# Patient Record
Sex: FEMALE | Race: WHITE | Employment: OTHER | URBAN - NONMETROPOLITAN AREA
[De-identification: names, ages, dates, MRNs, and addresses within clinical notes are randomized per-mention and may not be internally consistent; named-entity substitution may affect disease eponyms.]

---

## 2022-01-01 ENCOUNTER — APPOINTMENT (OUTPATIENT)
Dept: ULTRASOUND IMAGING | Age: 87
DRG: 534 | End: 2022-01-01
Attending: INTERNAL MEDICINE
Payer: MEDICARE

## 2022-01-01 ENCOUNTER — HOSPITAL ENCOUNTER (INPATIENT)
Age: 87
LOS: 4 days | Discharge: ANOTHER HOSPITAL | DRG: 534 | End: 2022-12-21
Attending: EMERGENCY MEDICINE | Admitting: INTERNAL MEDICINE
Payer: MEDICARE

## 2022-01-01 ENCOUNTER — APPOINTMENT (OUTPATIENT)
Dept: GENERAL RADIOLOGY | Age: 87
DRG: 534 | End: 2022-01-01
Attending: INTERNAL MEDICINE
Payer: MEDICARE

## 2022-01-01 ENCOUNTER — APPOINTMENT (OUTPATIENT)
Dept: GENERAL RADIOLOGY | Age: 87
DRG: 533 | End: 2022-01-01
Attending: STUDENT IN AN ORGANIZED HEALTH CARE EDUCATION/TRAINING PROGRAM
Payer: MEDICARE

## 2022-01-01 ENCOUNTER — APPOINTMENT (OUTPATIENT)
Dept: CT IMAGING | Age: 87
DRG: 533 | End: 2022-01-01
Attending: NURSE PRACTITIONER
Payer: MEDICARE

## 2022-01-01 ENCOUNTER — APPOINTMENT (OUTPATIENT)
Dept: NON INVASIVE DIAGNOSTICS | Age: 87
DRG: 534 | End: 2022-01-01
Attending: INTERNAL MEDICINE
Payer: MEDICARE

## 2022-01-01 ENCOUNTER — HOSPITAL ENCOUNTER (INPATIENT)
Age: 87
LOS: 3 days | Discharge: HOSPICE | DRG: 533 | End: 2022-12-24
Attending: FAMILY MEDICINE | Admitting: FAMILY MEDICINE
Payer: MEDICARE

## 2022-01-01 ENCOUNTER — HOSPICE ADMISSION (OUTPATIENT)
Dept: HOSPICE | Facility: HOSPICE | Age: 87
End: 2022-01-01
Payer: MEDICARE

## 2022-01-01 ENCOUNTER — APPOINTMENT (OUTPATIENT)
Dept: CT IMAGING | Age: 87
DRG: 533 | End: 2022-01-01
Attending: STUDENT IN AN ORGANIZED HEALTH CARE EDUCATION/TRAINING PROGRAM
Payer: MEDICARE

## 2022-01-01 ENCOUNTER — HOSPITAL ENCOUNTER (INPATIENT)
Age: 87
LOS: 1 days | DRG: 951 | End: 2022-12-25
Attending: INTERNAL MEDICINE | Admitting: INTERNAL MEDICINE

## 2022-01-01 VITALS
DIASTOLIC BLOOD PRESSURE: 43 MMHG | SYSTOLIC BLOOD PRESSURE: 108 MMHG | WEIGHT: 134 LBS | RESPIRATION RATE: 26 BRPM | HEIGHT: 67 IN | HEART RATE: 113 BPM | OXYGEN SATURATION: 99 % | BODY MASS INDEX: 21.03 KG/M2 | TEMPERATURE: 97.7 F

## 2022-01-01 VITALS
OXYGEN SATURATION: 91 % | HEART RATE: 130 BPM | RESPIRATION RATE: 32 BRPM | TEMPERATURE: 99 F | SYSTOLIC BLOOD PRESSURE: 102 MMHG | DIASTOLIC BLOOD PRESSURE: 47 MMHG

## 2022-01-01 VITALS
TEMPERATURE: 99.5 F | SYSTOLIC BLOOD PRESSURE: 104 MMHG | DIASTOLIC BLOOD PRESSURE: 60 MMHG | OXYGEN SATURATION: 96 % | HEART RATE: 98 BPM | RESPIRATION RATE: 29 BRPM

## 2022-01-01 DIAGNOSIS — S72.491A CLOSED COMMINUTED INTRA-ARTICULAR FRACTURE OF DISTAL END OF RIGHT FEMUR, INITIAL ENCOUNTER (HCC): ICD-10-CM

## 2022-01-01 DIAGNOSIS — S72.91XA CLOSED FRACTURE OF RIGHT FEMUR, UNSPECIFIED FRACTURE MORPHOLOGY, UNSPECIFIED PORTION OF FEMUR, INITIAL ENCOUNTER (HCC): Primary | ICD-10-CM

## 2022-01-01 DIAGNOSIS — R06.02 SHORTNESS OF BREATH: Primary | ICD-10-CM

## 2022-01-01 DIAGNOSIS — R41.0 DELIRIUM: ICD-10-CM

## 2022-01-01 DIAGNOSIS — G30.1 LATE ONSET ALZHEIMER DEMENTIA, UNSPECIFIED DEMENTIA SEVERITY, UNSPECIFIED WHETHER BEHAVIORAL, PSYCHOTIC, OR MOOD DISTURBANCE OR ANXIETY (HCC): Primary | ICD-10-CM

## 2022-01-01 DIAGNOSIS — I95.9 HYPOTENSION, UNSPECIFIED HYPOTENSION TYPE: ICD-10-CM

## 2022-01-01 DIAGNOSIS — F02.80 LATE ONSET ALZHEIMER DEMENTIA, UNSPECIFIED DEMENTIA SEVERITY, UNSPECIFIED WHETHER BEHAVIORAL, PSYCHOTIC, OR MOOD DISTURBANCE OR ANXIETY (HCC): Primary | ICD-10-CM
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
## 2022-01-01 DIAGNOSIS — Z51.5 PALLIATIVE CARE ENCOUNTER: ICD-10-CM

## 2022-01-01 DIAGNOSIS — Z51.5 HOSPICE CARE: ICD-10-CM

## 2022-01-01 DIAGNOSIS — G93.41 METABOLIC ENCEPHALOPATHY: ICD-10-CM

## 2022-01-01 DIAGNOSIS — Z51.5 END OF LIFE CARE: ICD-10-CM

## 2022-01-01 LAB
ABO + RH BLD: NORMAL
ABO + RH BLD: NORMAL
ALBUMIN SERPL-MCNC: 2.4 G/DL (ref 3.5–5)
ALBUMIN SERPL-MCNC: 2.6 G/DL (ref 3.5–5)
ALBUMIN SERPL-MCNC: 3.4 G/DL (ref 3.4–5)
ALBUMIN SERPL-MCNC: 3.6 G/DL (ref 3.4–5)
ALBUMIN/GLOB SERPL: 0.8 {RATIO} (ref 1.1–2.2)
ALBUMIN/GLOB SERPL: 0.8 {RATIO} (ref 1.1–2.2)
ALBUMIN/GLOB SERPL: 1 {RATIO} (ref 0.8–1.7)
ALBUMIN/GLOB SERPL: 1 {RATIO} (ref 0.8–1.7)
ALP SERPL-CCNC: 76 U/L (ref 45–117)
ALP SERPL-CCNC: 78 U/L (ref 45–117)
ALP SERPL-CCNC: 82 U/L (ref 45–117)
ALP SERPL-CCNC: 95 U/L (ref 45–117)
ALT SERPL-CCNC: 106 U/L (ref 13–56)
ALT SERPL-CCNC: 18 U/L (ref 13–56)
ALT SERPL-CCNC: 21 U/L (ref 12–78)
ALT SERPL-CCNC: 28 U/L (ref 12–78)
ANION GAP SERPL CALC-SCNC: 10 MMOL/L (ref 3–18)
ANION GAP SERPL CALC-SCNC: 2 MMOL/L (ref 5–15)
ANION GAP SERPL CALC-SCNC: 4 MMOL/L (ref 3–18)
ANION GAP SERPL CALC-SCNC: 6 MMOL/L (ref 3–18)
ANION GAP SERPL CALC-SCNC: 7 MMOL/L (ref 3–18)
ANION GAP SERPL CALC-SCNC: 7 MMOL/L (ref 3–18)
ANION GAP SERPL CALC-SCNC: 8 MMOL/L (ref 3–18)
ANION GAP SERPL CALC-SCNC: 9 MMOL/L (ref 3–18)
ANION GAP SERPL CALC-SCNC: ABNORMAL MMOL/L (ref 5–15)
ANTI-COMPLEMENT (C3B,C3D): NORMAL
APPEARANCE UR: CLEAR
APPEARANCE UR: CLEAR
APTT PPP: 26.7 SEC (ref 23–36.4)
AST SERPL W P-5'-P-CCNC: 134 U/L (ref 10–38)
AST SERPL W P-5'-P-CCNC: 15 U/L (ref 10–38)
AST SERPL-CCNC: 15 U/L (ref 15–37)
AST SERPL-CCNC: 68 U/L (ref 15–37)
ATRIAL RATE: 104 BPM
ATRIAL RATE: 125 BPM
BACTERIA SPEC CULT: ABNORMAL
BACTERIA SPEC CULT: NORMAL
BACTERIA SPEC CULT: NORMAL
BACTERIA URNS QL MICRO: ABNORMAL /HPF
BACTERIA URNS QL MICRO: NEGATIVE /HPF
BASOPHILS # BLD: 0 K/UL (ref 0–0.1)
BASOPHILS NFR BLD: 0 % (ref 0–1)
BASOPHILS NFR BLD: 0 % (ref 0–1)
BASOPHILS NFR BLD: 0 % (ref 0–2)
BASOPHILS NFR BLD: 1 % (ref 0–2)
BILIRUB DIRECT SERPL-MCNC: 1.6 MG/DL (ref 0–0.2)
BILIRUB SERPL-MCNC: 1.4 MG/DL (ref 0.2–1)
BILIRUB SERPL-MCNC: 2.6 MG/DL (ref 0.2–1)
BILIRUB SERPL-MCNC: 3.8 MG/DL (ref 0.2–1)
BILIRUB SERPL-MCNC: 7.8 MG/DL (ref 0.2–1)
BILIRUB UR QL CFM: POSITIVE
BILIRUB UR QL: ABNORMAL
BLD PROD TYP BPU: NORMAL
BLD PROD TYP BPU: NORMAL
BLOOD BANK CMNT PATIENT-IMP: NORMAL
BLOOD GROUP ANTIBODIES SERPL: NEGATIVE
BLOOD GROUP ANTIBODIES SERPL: NORMAL
BLOOD GROUP ANTIBODIES SERPL: NORMAL
BNP SERPL-MCNC: 4675 PG/ML (ref 0–1800)
BPU ID: NORMAL
BPU ID: NORMAL
BUN SERPL-MCNC: 13 MG/DL (ref 7–18)
BUN SERPL-MCNC: 14 MG/DL (ref 7–18)
BUN SERPL-MCNC: 18 MG/DL (ref 6–20)
BUN SERPL-MCNC: 22 MG/DL (ref 6–20)
BUN SERPL-MCNC: 27 MG/DL (ref 7–18)
BUN SERPL-MCNC: 33 MG/DL (ref 7–18)
BUN SERPL-MCNC: 33 MG/DL (ref 7–18)
BUN SERPL-MCNC: 36 MG/DL (ref 7–18)
BUN SERPL-MCNC: 37 MG/DL (ref 7–18)
BUN/CREAT SERPL: 16 (ref 12–20)
BUN/CREAT SERPL: 17 (ref 12–20)
BUN/CREAT SERPL: 26 (ref 12–20)
BUN/CREAT SERPL: 32 (ref 12–20)
BUN/CREAT SERPL: 37 (ref 12–20)
BUN/CREAT SERPL: 40 (ref 12–20)
BUN/CREAT SERPL: 40 (ref 12–20)
BUN/CREAT SERPL: 42 (ref 12–20)
BUN/CREAT SERPL: 48 (ref 12–20)
CA-I BLD-MCNC: 8.4 MG/DL (ref 8.5–10.1)
CA-I BLD-MCNC: 8.5 MG/DL (ref 8.5–10.1)
CA-I BLD-MCNC: 8.6 MG/DL (ref 8.5–10.1)
CA-I BLD-MCNC: 9 MG/DL (ref 8.5–10.1)
CA-I BLD-MCNC: 9.1 MG/DL (ref 8.5–10.1)
CA-I BLD-MCNC: 9.1 MG/DL (ref 8.5–10.1)
CA-I BLD-MCNC: 9.3 MG/DL (ref 8.5–10.1)
CALCIUM SERPL-MCNC: 8.2 MG/DL (ref 8.5–10.1)
CALCIUM SERPL-MCNC: 9 MG/DL (ref 8.5–10.1)
CALCULATED R AXIS, ECG10: 66 DEGREES
CALCULATED R AXIS, ECG10: 80 DEGREES
CALCULATED T AXIS, ECG11: 20 DEGREES
CALCULATED T AXIS, ECG11: 68 DEGREES
CAOX CRY URNS QL MICRO: ABNORMAL
CC UR VC: ABNORMAL
CEA SERPL-MCNC: 1.3 NG/ML
CHLORIDE SERPL-SCNC: 101 MMOL/L (ref 100–111)
CHLORIDE SERPL-SCNC: 105 MMOL/L (ref 100–111)
CHLORIDE SERPL-SCNC: 107 MMOL/L (ref 100–111)
CHLORIDE SERPL-SCNC: 107 MMOL/L (ref 100–111)
CHLORIDE SERPL-SCNC: 111 MMOL/L (ref 100–111)
CHLORIDE SERPL-SCNC: 112 MMOL/L (ref 97–108)
CHLORIDE SERPL-SCNC: 114 MMOL/L (ref 100–111)
CHLORIDE SERPL-SCNC: 114 MMOL/L (ref 100–111)
CHLORIDE SERPL-SCNC: 116 MMOL/L (ref 97–108)
CO2 SERPL-SCNC: 24 MMOL/L (ref 21–32)
CO2 SERPL-SCNC: 26 MMOL/L (ref 21–32)
CO2 SERPL-SCNC: 27 MMOL/L (ref 21–32)
CO2 SERPL-SCNC: 28 MMOL/L (ref 21–32)
CO2 SERPL-SCNC: 29 MMOL/L (ref 21–32)
CO2 SERPL-SCNC: 29 MMOL/L (ref 21–32)
CO2 SERPL-SCNC: 30 MMOL/L (ref 21–32)
COLONY COUNT,CNT: NORMAL
COLONY COUNT,CNT: NORMAL
COLOR UR: ABNORMAL
COLOR UR: ABNORMAL
COVID-19 RAPID TEST, COVR: NOT DETECTED
CREAT SERPL-MCNC: 0.6 MG/DL (ref 0.55–1.02)
CREAT SERPL-MCNC: 0.7 MG/DL (ref 0.55–1.02)
CREAT SERPL-MCNC: 0.75 MG/DL (ref 0.6–1.3)
CREAT SERPL-MCNC: 0.81 MG/DL (ref 0.6–1.3)
CREAT SERPL-MCNC: 0.82 MG/DL (ref 0.6–1.3)
CREAT SERPL-MCNC: 0.84 MG/DL (ref 0.6–1.3)
CREAT SERPL-MCNC: 0.89 MG/DL (ref 0.6–1.3)
CROSSMATCH RESULT,%XM: NORMAL
CROSSMATCH RESULT,%XM: NORMAL
DAT IGG-SP REAG RBC QL: NORMAL
DAT POLY-SP REAG RBC QL: NORMAL
DIAGNOSIS, 93000: NORMAL
DIAGNOSIS, 93000: NORMAL
DIFFERENTIAL METHOD BLD: ABNORMAL
ECHO AO ASC DIAM: 3.5 CM
ECHO AO ASCENDING AORTA INDEX: 2.05 CM/M2
ECHO AO ROOT DIAM: 3.2 CM
ECHO AO ROOT INDEX: 1.87 CM/M2
ECHO AV AREA PEAK VELOCITY: 1.8 CM2
ECHO AV AREA VTI: 1.9 CM2
ECHO AV AREA/BSA PEAK VELOCITY: 1.1 CM2/M2
ECHO AV AREA/BSA VTI: 1.1 CM2/M2
ECHO AV MEAN GRADIENT: 8 MMHG
ECHO AV MEAN VELOCITY: 1.3 M/S
ECHO AV PEAK GRADIENT: 15 MMHG
ECHO AV PEAK VELOCITY: 1.9 M/S
ECHO AV VELOCITY RATIO: 0.53
ECHO AV VTI: 35.6 CM
ECHO EST RA PRESSURE: 3 MMHG
ECHO IVC PROX: 1.9 CM
ECHO LA AREA 2C: 32 CM2
ECHO LA AREA 4C: 26.7 CM2
ECHO LA DIAMETER INDEX: 2.92 CM/M2
ECHO LA DIAMETER: 5 CM
ECHO LA MAJOR AXIS: 6.5 CM
ECHO LA MINOR AXIS: 7.9 CM
ECHO LA TO AORTIC ROOT RATIO: 1.56
ECHO LA VOL BP: 109 ML (ref 22–52)
ECHO LA VOL/BSA BIPLANE: 64 ML/M2 (ref 16–34)
ECHO LV FRACTIONAL SHORTENING: 34 % (ref 28–44)
ECHO LV INTERNAL DIMENSION DIASTOLE INDEX: 2.4 CM/M2
ECHO LV INTERNAL DIMENSION DIASTOLIC: 4.1 CM (ref 3.9–5.3)
ECHO LV INTERNAL DIMENSION SYSTOLIC INDEX: 1.58 CM/M2
ECHO LV INTERNAL DIMENSION SYSTOLIC: 2.7 CM
ECHO LV IVSD: 0.9 CM (ref 0.6–0.9)
ECHO LV MASS 2D: 132.1 G (ref 67–162)
ECHO LV MASS INDEX 2D: 77.3 G/M2 (ref 43–95)
ECHO LV POSTERIOR WALL DIASTOLIC: 1.1 CM (ref 0.6–0.9)
ECHO LV RELATIVE WALL THICKNESS RATIO: 0.54
ECHO LVOT AREA: 3.5 CM2
ECHO LVOT AV VTI INDEX: 0.55
ECHO LVOT DIAM: 2.1 CM
ECHO LVOT MEAN GRADIENT: 2 MMHG
ECHO LVOT PEAK GRADIENT: 4 MMHG
ECHO LVOT PEAK VELOCITY: 1 M/S
ECHO LVOT STROKE VOLUME INDEX: 39.5 ML/M2
ECHO LVOT SV: 67.5 ML
ECHO LVOT VTI: 19.5 CM
ECHO MV AREA VTI: 3.1 CM2
ECHO MV LVOT VTI INDEX: 1.13
ECHO MV MAX VELOCITY: 1.3 M/S
ECHO MV MEAN GRADIENT: 3 MMHG
ECHO MV MEAN VELOCITY: 0.8 M/S
ECHO MV PEAK GRADIENT: 7 MMHG
ECHO MV VTI: 22.1 CM
ECHO PULMONARY ARTERY END DIASTOLIC PRESSURE: 4 MMHG
ECHO PV MAX VELOCITY: 1 M/S
ECHO PV MAX VELOCITY: 1 M/S
ECHO PV PEAK GRADIENT: 4 MMHG
ECHO RA AREA 4C: 23.9 CM2
ECHO RA END SYSTOLIC VOLUME APICAL 4 CHAMBER INDEX BSA: 41 ML/M2
ECHO RA VOLUME: 70 ML
ECHO RIGHT VENTRICULAR SYSTOLIC PRESSURE (RVSP): 48 MMHG
ECHO RV BASAL DIMENSION: 3.9 CM
ECHO RV LONGITUDINAL DIMENSION: 4.8 CM
ECHO RV MID DIMENSION: 2.9 CM
ECHO RV TAPSE: 1.6 CM (ref 1.7–?)
ECHO TV REGURGITANT MAX VELOCITY: 3.35 M/S
ECHO TV REGURGITANT PEAK GRADIENT: 45 MMHG
EOSINOPHIL # BLD: 0 K/UL (ref 0–0.4)
EOSINOPHIL # BLD: 0.1 K/UL (ref 0–0.4)
EOSINOPHIL # BLD: 0.1 K/UL (ref 0–0.4)
EOSINOPHIL NFR BLD: 0 % (ref 0–5)
EOSINOPHIL NFR BLD: 0 % (ref 0–7)
EOSINOPHIL NFR BLD: 1 % (ref 0–5)
EOSINOPHIL NFR BLD: 1 % (ref 0–7)
EPITH CASTS URNS QL MICRO: ABNORMAL /LPF
EPITH CASTS URNS QL MICRO: ABNORMAL /LPF (ref 0–20)
ERYTHROCYTE [DISTWIDTH] IN BLOOD BY AUTOMATED COUNT: 16.6 % (ref 11.6–14.5)
ERYTHROCYTE [DISTWIDTH] IN BLOOD BY AUTOMATED COUNT: 16.9 % (ref 11.6–14.5)
ERYTHROCYTE [DISTWIDTH] IN BLOOD BY AUTOMATED COUNT: 17 % (ref 11.5–14.5)
ERYTHROCYTE [DISTWIDTH] IN BLOOD BY AUTOMATED COUNT: 17.5 % (ref 11.6–14.5)
ERYTHROCYTE [DISTWIDTH] IN BLOOD BY AUTOMATED COUNT: 18.1 % (ref 11.6–14.5)
ERYTHROCYTE [DISTWIDTH] IN BLOOD BY AUTOMATED COUNT: 19.1 % (ref 11.6–14.5)
ERYTHROCYTE [DISTWIDTH] IN BLOOD BY AUTOMATED COUNT: 20 % (ref 11.6–14.5)
ERYTHROCYTE [DISTWIDTH] IN BLOOD BY AUTOMATED COUNT: 20.1 % (ref 11.6–14.5)
ERYTHROCYTE [DISTWIDTH] IN BLOOD BY AUTOMATED COUNT: 24.1 % (ref 11.6–14.5)
ERYTHROCYTE [DISTWIDTH] IN BLOOD BY AUTOMATED COUNT: 24.7 % (ref 11.5–14.5)
FERRITIN SERPL-MCNC: 631 NG/ML (ref 26–388)
FLOW CYTOMETRY: NORMAL
FOLATE SERPL-MCNC: 28.7 NG/ML (ref 5–21)
GLOBULIN SER CALC-MCNC: 3.1 G/DL (ref 2–4)
GLOBULIN SER CALC-MCNC: 3.1 G/DL (ref 2–4)
GLOBULIN SER CALC-MCNC: 3.5 G/DL (ref 2–4)
GLOBULIN SER CALC-MCNC: 3.6 G/DL (ref 2–4)
GLUCOSE BLD STRIP.AUTO-MCNC: 103 MG/DL (ref 70–110)
GLUCOSE SERPL-MCNC: 104 MG/DL (ref 74–99)
GLUCOSE SERPL-MCNC: 108 MG/DL (ref 74–99)
GLUCOSE SERPL-MCNC: 109 MG/DL (ref 65–100)
GLUCOSE SERPL-MCNC: 110 MG/DL (ref 74–99)
GLUCOSE SERPL-MCNC: 130 MG/DL (ref 65–100)
GLUCOSE SERPL-MCNC: 139 MG/DL (ref 74–99)
GLUCOSE SERPL-MCNC: 94 MG/DL (ref 74–99)
GLUCOSE SERPL-MCNC: 94 MG/DL (ref 74–99)
GLUCOSE SERPL-MCNC: 99 MG/DL (ref 74–99)
GLUCOSE UR STRIP.AUTO-MCNC: NEGATIVE MG/DL
GLUCOSE UR STRIP.AUTO-MCNC: NEGATIVE MG/DL
HAPTOGLOB SERPL-MCNC: 8 MG/DL (ref 30–200)
HCT VFR BLD AUTO: 16 % (ref 35–47)
HCT VFR BLD AUTO: 17.3 % (ref 35–45)
HCT VFR BLD AUTO: 18.1 % (ref 35–45)
HCT VFR BLD AUTO: 19.4 % (ref 35–45)
HCT VFR BLD AUTO: 19.4 % (ref 35–45)
HCT VFR BLD AUTO: 20.6 % (ref 35–45)
HCT VFR BLD AUTO: 21.1 % (ref 35–45)
HCT VFR BLD AUTO: 21.2 % (ref 35–45)
HCT VFR BLD AUTO: 22.4 % (ref 35–45)
HCT VFR BLD AUTO: 24.4 % (ref 35–47)
HCT VFR BLD AUTO: 27.6 % (ref 35–45)
HCT VFR BLD AUTO: 30.4 % (ref 35–45)
HEMOCCULT STL QL: NEGATIVE
HGB BLD-MCNC: 10.8 G/DL (ref 12–16)
HGB BLD-MCNC: 5.3 G/DL (ref 11.5–16)
HGB BLD-MCNC: 6.1 G/DL (ref 12–16)
HGB BLD-MCNC: 6.2 G/DL (ref 12–16)
HGB BLD-MCNC: 6.3 G/DL (ref 12–16)
HGB BLD-MCNC: 6.8 G/DL (ref 12–16)
HGB BLD-MCNC: 7.1 G/DL (ref 12–16)
HGB BLD-MCNC: 7.1 G/DL (ref 12–16)
HGB BLD-MCNC: 7.2 G/DL (ref 12–16)
HGB BLD-MCNC: 7.8 G/DL (ref 12–16)
HGB BLD-MCNC: 8 G/DL (ref 11.5–16)
HGB BLD-MCNC: 9.3 G/DL (ref 12–16)
HGB UR QL STRIP: ABNORMAL
HGB UR QL STRIP: NEGATIVE
HISTORY CHECKED?,CKHIST: NORMAL
HYALINE CASTS URNS QL MICRO: ABNORMAL /LPF (ref 0–5)
IMM GRANULOCYTES # BLD AUTO: 0 K/UL (ref 0–0.04)
IMM GRANULOCYTES # BLD AUTO: 0.1 K/UL (ref 0–0.04)
IMM GRANULOCYTES # BLD AUTO: 0.3 K/UL (ref 0–0.04)
IMM GRANULOCYTES NFR BLD AUTO: 0 % (ref 0–0.5)
IMM GRANULOCYTES NFR BLD AUTO: 0 % (ref 0–0.5)
IMM GRANULOCYTES NFR BLD AUTO: 1 % (ref 0–0.5)
IMM GRANULOCYTES NFR BLD AUTO: 3 % (ref 0–0.5)
INR PPP: 1.8 (ref 0.8–1.2)
INR PPP: 2.3 (ref 0.8–1.2)
INR PPP: 3 (ref 0.8–1.2)
INR PPP: 3.9 (ref 0.8–1.2)
INR PPP: 4.1 (ref 0.8–1.2)
IRON SATN MFR SERPL: 30 % (ref 20–50)
IRON SERPL-MCNC: 31 UG/DL (ref 35–150)
KETONES UR QL STRIP.AUTO: ABNORMAL MG/DL
KETONES UR QL STRIP.AUTO: NEGATIVE MG/DL
LDH SERPL L TO P-CCNC: 106 U/L (ref 81–246)
LDH SERPL L TO P-CCNC: 486 U/L (ref 81–246)
LEUKOCYTE ESTERASE UR QL STRIP.AUTO: ABNORMAL
LEUKOCYTE ESTERASE UR QL STRIP.AUTO: NEGATIVE
LYMPHOCYTES # BLD: 0.5 K/UL (ref 0.8–3.5)
LYMPHOCYTES # BLD: 0.6 K/UL (ref 0.9–3.6)
LYMPHOCYTES # BLD: 0.7 K/UL (ref 0.8–3.5)
LYMPHOCYTES # BLD: 0.7 K/UL (ref 0.9–3.6)
LYMPHOCYTES # BLD: 0.8 K/UL (ref 0.9–3.6)
LYMPHOCYTES # BLD: 1.2 K/UL (ref 0.9–3.6)
LYMPHOCYTES # BLD: 1.3 K/UL (ref 0.9–3.6)
LYMPHOCYTES # BLD: 1.5 K/UL (ref 0.9–3.6)
LYMPHOCYTES NFR BLD: 10 % (ref 21–52)
LYMPHOCYTES NFR BLD: 14 % (ref 21–52)
LYMPHOCYTES NFR BLD: 17 % (ref 21–52)
LYMPHOCYTES NFR BLD: 27 % (ref 21–52)
LYMPHOCYTES NFR BLD: 6 % (ref 21–52)
LYMPHOCYTES NFR BLD: 6 % (ref 21–52)
LYMPHOCYTES NFR BLD: 7 % (ref 12–49)
LYMPHOCYTES NFR BLD: 7 % (ref 21–52)
LYMPHOCYTES NFR BLD: 7 % (ref 21–52)
LYMPHOCYTES NFR BLD: 8 % (ref 12–49)
MAGNESIUM SERPL-MCNC: 2.1 MG/DL (ref 1.6–2.6)
MAGNESIUM SERPL-MCNC: 2.2 MG/DL (ref 1.6–2.6)
MAGNESIUM SERPL-MCNC: 2.3 MG/DL (ref 1.6–2.6)
MCH RBC QN AUTO: 31.1 PG (ref 26–34)
MCH RBC QN AUTO: 32.9 PG (ref 26–34)
MCH RBC QN AUTO: 33.3 PG (ref 24–34)
MCH RBC QN AUTO: 33.7 PG (ref 24–34)
MCH RBC QN AUTO: 34.1 PG (ref 24–34)
MCH RBC QN AUTO: 35.2 PG (ref 24–34)
MCH RBC QN AUTO: 35.3 PG (ref 24–34)
MCH RBC QN AUTO: 36 PG (ref 24–34)
MCH RBC QN AUTO: 36.4 PG (ref 24–34)
MCH RBC QN AUTO: 37 PG (ref 24–34)
MCH RBC QN AUTO: 37.4 PG (ref 24–34)
MCH RBC QN AUTO: 43 PG (ref 24–34)
MCHC RBC AUTO-ENTMCNC: 32 G/DL (ref 31–37)
MCHC RBC AUTO-ENTMCNC: 32.5 G/DL (ref 31–37)
MCHC RBC AUTO-ENTMCNC: 32.8 G/DL (ref 30–36.5)
MCHC RBC AUTO-ENTMCNC: 33 G/DL (ref 31–37)
MCHC RBC AUTO-ENTMCNC: 33.1 G/DL (ref 30–36.5)
MCHC RBC AUTO-ENTMCNC: 33.6 G/DL (ref 31–37)
MCHC RBC AUTO-ENTMCNC: 33.7 G/DL (ref 31–37)
MCHC RBC AUTO-ENTMCNC: 34.4 G/DL (ref 31–37)
MCHC RBC AUTO-ENTMCNC: 34.8 G/DL (ref 31–37)
MCHC RBC AUTO-ENTMCNC: 35.3 G/DL (ref 31–37)
MCHC RBC AUTO-ENTMCNC: 35.5 G/DL (ref 31–37)
MCHC RBC AUTO-ENTMCNC: 39.2 G/DL (ref 31–37)
MCV RBC AUTO: 100 FL (ref 78–100)
MCV RBC AUTO: 102.4 FL (ref 78–100)
MCV RBC AUTO: 102.6 FL (ref 78–100)
MCV RBC AUTO: 106.1 FL (ref 78–100)
MCV RBC AUTO: 106.2 FL (ref 78–100)
MCV RBC AUTO: 106.6 FL (ref 78–100)
MCV RBC AUTO: 106.7 FL (ref 78–100)
MCV RBC AUTO: 109.7 FL (ref 78–100)
MCV RBC AUTO: 94.9 FL (ref 80–99)
MCV RBC AUTO: 99.4 FL (ref 80–99)
MONOCYTES # BLD: 0.3 K/UL (ref 0.05–1.2)
MONOCYTES # BLD: 0.4 K/UL (ref 0–1)
MONOCYTES # BLD: 0.7 K/UL (ref 0.05–1.2)
MONOCYTES # BLD: 0.7 K/UL (ref 0–1)
MONOCYTES # BLD: 0.8 K/UL (ref 0.05–1.2)
MONOCYTES # BLD: 0.8 K/UL (ref 0.05–1.2)
MONOCYTES NFR BLD: 6 % (ref 3–10)
MONOCYTES NFR BLD: 6 % (ref 5–13)
MONOCYTES NFR BLD: 8 % (ref 3–10)
MONOCYTES NFR BLD: 8 % (ref 5–13)
MONOCYTES NFR BLD: 9 % (ref 3–10)
MONOCYTES NFR BLD: 9 % (ref 3–10)
NEUTS SEG # BLD: 3 K/UL (ref 1.8–8)
NEUTS SEG # BLD: 5.7 K/UL (ref 1.8–8)
NEUTS SEG # BLD: 6.6 K/UL (ref 1.8–8)
NEUTS SEG # BLD: 6.6 K/UL (ref 1.8–8)
NEUTS SEG # BLD: 6.8 K/UL (ref 1.8–8)
NEUTS SEG # BLD: 7.1 K/UL (ref 1.8–8)
NEUTS SEG # BLD: 7.3 K/UL (ref 1.8–8)
NEUTS SEG # BLD: 7.5 K/UL (ref 1.8–8)
NEUTS SEG # BLD: 8 K/UL (ref 1.8–8)
NEUTS SEG # BLD: 8.4 K/UL (ref 1.8–8)
NEUTS SEG NFR BLD: 64 % (ref 40–73)
NEUTS SEG NFR BLD: 75 % (ref 40–73)
NEUTS SEG NFR BLD: 77 % (ref 40–73)
NEUTS SEG NFR BLD: 81 % (ref 32–75)
NEUTS SEG NFR BLD: 82 % (ref 40–73)
NEUTS SEG NFR BLD: 83 % (ref 40–73)
NEUTS SEG NFR BLD: 84 % (ref 40–73)
NEUTS SEG NFR BLD: 84 % (ref 40–73)
NEUTS SEG NFR BLD: 85 % (ref 32–75)
NEUTS SEG NFR BLD: 85 % (ref 40–73)
NITRITE UR QL STRIP.AUTO: NEGATIVE
NITRITE UR QL STRIP.AUTO: POSITIVE
NRBC # BLD: 0 K/UL (ref 0–0.01)
NRBC # BLD: 0.08 K/UL (ref 0–0.01)
NRBC # BLD: 0.1 K/UL (ref 0–0.01)
NRBC # BLD: 0.15 K/UL (ref 0–0.01)
NRBC # BLD: 0.3 K/UL (ref 0–0.01)
NRBC # BLD: 0.77 K/UL (ref 0–0.01)
NRBC BLD-RTO: 0 PER 100 WBC
NRBC BLD-RTO: 0.9 PER 100 WBC
NRBC BLD-RTO: 1.1 PER 100 WBC
NRBC BLD-RTO: 1.5 PER 100 WBC
NRBC BLD-RTO: 4.4 PER 100 WBC
NRBC BLD-RTO: 8.8 PER 100 WBC
P-R INTERVAL, ECG05: 57 MS
PERFORMED BY, TECHID: NORMAL
PH UR STRIP: 5.5 [PH] (ref 5–8)
PH UR STRIP: 5.5 [PH] (ref 5–8)
PHOSPHATE SERPL-MCNC: 2.2 MG/DL (ref 2.5–4.9)
PHOSPHATE SERPL-MCNC: 3.2 MG/DL (ref 2.5–4.9)
PLATELET # BLD AUTO: 144 K/UL (ref 150–400)
PLATELET # BLD AUTO: 159 K/UL (ref 135–420)
PLATELET # BLD AUTO: 161 K/UL (ref 135–420)
PLATELET # BLD AUTO: 174 K/UL (ref 135–420)
PLATELET # BLD AUTO: 189 K/UL (ref 135–420)
PLATELET # BLD AUTO: 193 K/UL (ref 135–420)
PLATELET # BLD AUTO: 194 K/UL (ref 135–420)
PLATELET # BLD AUTO: 195 K/UL (ref 135–420)
PLATELET # BLD AUTO: 201 K/UL (ref 150–400)
PLATELET # BLD AUTO: 224 K/UL (ref 135–420)
PLATELET COMMENTS,PCOM: ABNORMAL
PMV BLD AUTO: 10.6 FL (ref 9.2–11.8)
PMV BLD AUTO: 10.6 FL (ref 9.2–11.8)
PMV BLD AUTO: 11 FL (ref 9.2–11.8)
PMV BLD AUTO: 11 FL (ref 9.2–11.8)
PMV BLD AUTO: 11.1 FL (ref 9.2–11.8)
PMV BLD AUTO: 11.1 FL (ref 9.2–11.8)
PMV BLD AUTO: 11.3 FL (ref 9.2–11.8)
PMV BLD AUTO: 11.3 FL (ref 9.2–11.8)
PMV BLD AUTO: 11.4 FL (ref 8.9–12.9)
POTASSIUM SERPL-SCNC: 3.5 MMOL/L (ref 3.5–5.1)
POTASSIUM SERPL-SCNC: 4 MMOL/L (ref 3.5–5.5)
POTASSIUM SERPL-SCNC: 4.1 MMOL/L (ref 3.5–5.5)
POTASSIUM SERPL-SCNC: 4.5 MMOL/L (ref 3.5–5.5)
POTASSIUM SERPL-SCNC: 4.6 MMOL/L (ref 3.5–5.5)
POTASSIUM SERPL-SCNC: 4.8 MMOL/L (ref 3.5–5.5)
POTASSIUM SERPL-SCNC: ABNORMAL MMOL/L (ref 3.5–5.1)
PROT SERPL-MCNC: 5.5 G/DL (ref 6.4–8.2)
PROT SERPL-MCNC: 5.7 G/DL (ref 6.4–8.2)
PROT SERPL-MCNC: 6.9 G/DL (ref 6.4–8.2)
PROT SERPL-MCNC: 7.2 G/DL (ref 6.4–8.2)
PROT UR STRIP-MCNC: 30 MG/DL
PROT UR STRIP-MCNC: 30 MG/DL
PROTHROMBIN TIME: 21.3 SEC (ref 11.5–15.2)
PROTHROMBIN TIME: 25.7 SEC (ref 11.5–15.2)
PROTHROMBIN TIME: 31 SEC (ref 11.5–15.2)
PROTHROMBIN TIME: 38.6 SEC (ref 11.5–15.2)
PROTHROMBIN TIME: 39.7 SEC (ref 11.5–15.2)
Q-T INTERVAL, ECG07: 292 MS
Q-T INTERVAL, ECG07: 408 MS
QRS DURATION, ECG06: 76 MS
QRS DURATION, ECG06: 82 MS
QTC CALCULATION (BEZET), ECG08: 383 MS
QTC CALCULATION (BEZET), ECG08: 434 MS
RBC # BLD AUTO: 1.61 M/UL (ref 3.8–5.2)
RBC # BLD AUTO: 1.63 M/UL (ref 4.2–5.3)
RBC # BLD AUTO: 1.65 M/UL (ref 4.2–5.3)
RBC # BLD AUTO: 1.82 M/UL (ref 4.2–5.3)
RBC # BLD AUTO: 1.89 M/UL (ref 4.2–5.3)
RBC # BLD AUTO: 1.93 M/UL (ref 4.2–5.3)
RBC # BLD AUTO: 2.11 M/UL (ref 4.2–5.3)
RBC # BLD AUTO: 2.57 M/UL (ref 3.8–5.2)
RBC # BLD AUTO: 2.76 M/UL (ref 4.2–5.3)
RBC # BLD AUTO: 2.97 M/UL (ref 4.2–5.3)
RBC #/AREA URNS HPF: ABNORMAL /HPF (ref 0–2)
RBC #/AREA URNS HPF: ABNORMAL /HPF (ref 0–5)
RBC MORPH BLD: ABNORMAL
RETICS # AUTO: 0.12 M/UL (ref 0.02–0.08)
RETICS/RBC NFR AUTO: 6.6 % (ref 0.7–2.1)
SERVICE CMNT-IMP: ABNORMAL
SERVICE CMNT-IMP: NORMAL
SODIUM SERPL-SCNC: 138 MMOL/L (ref 136–145)
SODIUM SERPL-SCNC: 139 MMOL/L (ref 136–145)
SODIUM SERPL-SCNC: 140 MMOL/L (ref 136–145)
SODIUM SERPL-SCNC: 142 MMOL/L (ref 136–145)
SODIUM SERPL-SCNC: 142 MMOL/L (ref 136–145)
SODIUM SERPL-SCNC: 144 MMOL/L (ref 136–145)
SODIUM SERPL-SCNC: 146 MMOL/L (ref 136–145)
SODIUM SERPL-SCNC: 148 MMOL/L (ref 136–145)
SODIUM SERPL-SCNC: 149 MMOL/L (ref 136–145)
SP GR UR REFRACTOMETRY: 1.02 (ref 1–1.03)
SP GR UR REFRACTOMETRY: 1.02 (ref 1–1.03)
SPECIAL REQUESTS,SREQ: NORMAL
SPECIMEN EXP DATE BLD: NORMAL
SPECIMEN EXP DATE BLD: NORMAL
STATUS OF UNIT,%ST: NORMAL
STATUS OF UNIT,%ST: NORMAL
THERAPEUTIC RANGE,PTTT: NORMAL SEC (ref 82–109)
TIBC SERPL-MCNC: 104 UG/DL (ref 250–450)
UA: UC IF INDICATED,UAUC: ABNORMAL
UFH PPP CHRO-ACNC: <0.1 IU/ML
UNIT DIVISION, %UDIV: 0
UNIT DIVISION, %UDIV: 0
UROBILINOGEN UR QL STRIP.AUTO: 1 EU/DL (ref 0.2–1)
UROBILINOGEN UR QL STRIP.AUTO: 2 EU/DL (ref 0.2–1)
VENTRICULAR RATE, ECG03: 104 BPM
VENTRICULAR RATE, ECG03: 68 BPM
VIT B12 SERPL-MCNC: 895 PG/ML (ref 193–986)
WBC # BLD AUTO: 4.8 K/UL (ref 4.6–13.2)
WBC # BLD AUTO: 6.8 K/UL (ref 3.6–11)
WBC # BLD AUTO: 8.3 K/UL (ref 4.6–13.2)
WBC # BLD AUTO: 8.7 K/UL (ref 4.6–13.2)
WBC # BLD AUTO: 8.7 K/UL (ref 4.6–13.2)
WBC # BLD AUTO: 8.8 K/UL (ref 3.6–11)
WBC # BLD AUTO: 8.9 K/UL (ref 4.6–13.2)
WBC # BLD AUTO: 8.9 K/UL (ref 4.6–13.2)
WBC # BLD AUTO: 9.6 K/UL (ref 4.6–13.2)
WBC # BLD AUTO: 9.8 K/UL (ref 4.6–13.2)
WBC URNS QL MICRO: ABNORMAL /HPF (ref 0–4)
WBC URNS QL MICRO: ABNORMAL /HPF (ref 0–4)

## 2022-01-01 PROCEDURE — 85025 COMPLETE CBC W/AUTO DIFF WBC: CPT

## 2022-01-01 PROCEDURE — 74011000636 HC RX REV CODE- 636: Performed by: RADIOLOGY

## 2022-01-01 PROCEDURE — 74011250637 HC RX REV CODE- 250/637: Performed by: NURSE PRACTITIONER

## 2022-01-01 PROCEDURE — 80048 BASIC METABOLIC PNL TOTAL CA: CPT

## 2022-01-01 PROCEDURE — 36415 COLL VENOUS BLD VENIPUNCTURE: CPT

## 2022-01-01 PROCEDURE — P9016 RBC LEUKOCYTES REDUCED: HCPCS

## 2022-01-01 PROCEDURE — 85018 HEMOGLOBIN: CPT

## 2022-01-01 PROCEDURE — 94761 N-INVAS EAR/PLS OXIMETRY MLT: CPT

## 2022-01-01 PROCEDURE — 74011000250 HC RX REV CODE- 250: Performed by: NURSE PRACTITIONER

## 2022-01-01 PROCEDURE — 74011000250 HC RX REV CODE- 250: Performed by: STUDENT IN AN ORGANIZED HEALTH CARE EDUCATION/TRAINING PROGRAM

## 2022-01-01 PROCEDURE — 96375 TX/PRO/DX INJ NEW DRUG ADDON: CPT

## 2022-01-01 PROCEDURE — 96376 TX/PRO/DX INJ SAME DRUG ADON: CPT

## 2022-01-01 PROCEDURE — 85610 PROTHROMBIN TIME: CPT

## 2022-01-01 PROCEDURE — 85520 HEPARIN ASSAY: CPT

## 2022-01-01 PROCEDURE — 0656 HSPC GENERAL INPATIENT

## 2022-01-01 PROCEDURE — 77010033678 HC OXYGEN DAILY

## 2022-01-01 PROCEDURE — 74018 RADEX ABDOMEN 1 VIEW: CPT

## 2022-01-01 PROCEDURE — 74011250636 HC RX REV CODE- 250/636: Performed by: NURSE PRACTITIONER

## 2022-01-01 PROCEDURE — 83735 ASSAY OF MAGNESIUM: CPT

## 2022-01-01 PROCEDURE — 82607 VITAMIN B-12: CPT

## 2022-01-01 PROCEDURE — 74011000250 HC RX REV CODE- 250: Performed by: INTERNAL MEDICINE

## 2022-01-01 PROCEDURE — 81001 URINALYSIS AUTO W/SCOPE: CPT

## 2022-01-01 PROCEDURE — 82248 BILIRUBIN DIRECT: CPT

## 2022-01-01 PROCEDURE — 82962 GLUCOSE BLOOD TEST: CPT

## 2022-01-01 PROCEDURE — 74011250636 HC RX REV CODE- 250/636: Performed by: STUDENT IN AN ORGANIZED HEALTH CARE EDUCATION/TRAINING PROGRAM

## 2022-01-01 PROCEDURE — 87040 BLOOD CULTURE FOR BACTERIA: CPT

## 2022-01-01 PROCEDURE — 83615 LACTATE (LD) (LDH) ENZYME: CPT

## 2022-01-01 PROCEDURE — 73552 X-RAY EXAM OF FEMUR 2/>: CPT

## 2022-01-01 PROCEDURE — 83540 ASSAY OF IRON: CPT

## 2022-01-01 PROCEDURE — 74011250636 HC RX REV CODE- 250/636: Performed by: INTERNAL MEDICINE

## 2022-01-01 PROCEDURE — 83010 ASSAY OF HAPTOGLOBIN QUANT: CPT

## 2022-01-01 PROCEDURE — 74011000258 HC RX REV CODE- 258: Performed by: NURSE PRACTITIONER

## 2022-01-01 PROCEDURE — 65660000001 HC RM ICU INTERMED STEPDOWN

## 2022-01-01 PROCEDURE — 74011000258 HC RX REV CODE- 258: Performed by: STUDENT IN AN ORGANIZED HEALTH CARE EDUCATION/TRAINING PROGRAM

## 2022-01-01 PROCEDURE — 86880 COOMBS TEST DIRECT: CPT

## 2022-01-01 PROCEDURE — 99285 EMERGENCY DEPT VISIT HI MDM: CPT

## 2022-01-01 PROCEDURE — 84100 ASSAY OF PHOSPHORUS: CPT

## 2022-01-01 PROCEDURE — 93005 ELECTROCARDIOGRAM TRACING: CPT

## 2022-01-01 PROCEDURE — 65270000029 HC RM PRIVATE

## 2022-01-01 PROCEDURE — 86920 COMPATIBILITY TEST SPIN: CPT

## 2022-01-01 PROCEDURE — 65610000006 HC RM INTENSIVE CARE

## 2022-01-01 PROCEDURE — 76705 ECHO EXAM OF ABDOMEN: CPT

## 2022-01-01 PROCEDURE — 82784 ASSAY IGA/IGD/IGG/IGM EACH: CPT

## 2022-01-01 PROCEDURE — 71045 X-RAY EXAM CHEST 1 VIEW: CPT

## 2022-01-01 PROCEDURE — 83880 ASSAY OF NATRIURETIC PEPTIDE: CPT

## 2022-01-01 PROCEDURE — 36430 TRANSFUSION BLD/BLD COMPNT: CPT

## 2022-01-01 PROCEDURE — 87086 URINE CULTURE/COLONY COUNT: CPT

## 2022-01-01 PROCEDURE — 87186 SC STD MICRODIL/AGAR DIL: CPT

## 2022-01-01 PROCEDURE — 74011000250 HC RX REV CODE- 250: Performed by: FAMILY MEDICINE

## 2022-01-01 PROCEDURE — 86921 COMPATIBILITY TEST INCUBATE: CPT

## 2022-01-01 PROCEDURE — 93306 TTE W/DOPPLER COMPLETE: CPT

## 2022-01-01 PROCEDURE — 86900 BLOOD TYPING SEROLOGIC ABO: CPT

## 2022-01-01 PROCEDURE — 80053 COMPREHEN METABOLIC PANEL: CPT

## 2022-01-01 PROCEDURE — 86922 COMPATIBILITY TEST ANTIGLOB: CPT

## 2022-01-01 PROCEDURE — 99223 1ST HOSP IP/OBS HIGH 75: CPT | Performed by: INTERNAL MEDICINE

## 2022-01-01 PROCEDURE — 99223 1ST HOSP IP/OBS HIGH 75: CPT | Performed by: NURSE PRACTITIONER

## 2022-01-01 PROCEDURE — 73706 CT ANGIO LWR EXTR W/O&W/DYE: CPT

## 2022-01-01 PROCEDURE — 99356 PR PROLONGED SVC I/P OR OBS SETTING 1ST HOUR: CPT | Performed by: NURSE PRACTITIONER

## 2022-01-01 PROCEDURE — 74011250637 HC RX REV CODE- 250/637: Performed by: STUDENT IN AN ORGANIZED HEALTH CARE EDUCATION/TRAINING PROGRAM

## 2022-01-01 PROCEDURE — G0378 HOSPITAL OBSERVATION PER HR: HCPCS

## 2022-01-01 PROCEDURE — 74011250637 HC RX REV CODE- 250/637: Performed by: INTERNAL MEDICINE

## 2022-01-01 PROCEDURE — 85730 THROMBOPLASTIN TIME PARTIAL: CPT

## 2022-01-01 PROCEDURE — 85045 AUTOMATED RETICULOCYTE COUNT: CPT

## 2022-01-01 PROCEDURE — 86870 RBC ANTIBODY IDENTIFICATION: CPT

## 2022-01-01 PROCEDURE — 74011000258 HC RX REV CODE- 258: Performed by: FAMILY MEDICINE

## 2022-01-01 PROCEDURE — 02HV33Z INSERTION OF INFUSION DEVICE INTO SUPERIOR VENA CAVA, PERCUTANEOUS APPROACH: ICD-10-PCS | Performed by: ANESTHESIOLOGY

## 2022-01-01 PROCEDURE — 96374 THER/PROPH/DIAG INJ IV PUSH: CPT

## 2022-01-01 PROCEDURE — 82728 ASSAY OF FERRITIN: CPT

## 2022-01-01 PROCEDURE — 82746 ASSAY OF FOLIC ACID SERUM: CPT

## 2022-01-01 PROCEDURE — 87635 SARS-COV-2 COVID-19 AMP PRB: CPT

## 2022-01-01 PROCEDURE — P9047 ALBUMIN (HUMAN), 25%, 50ML: HCPCS | Performed by: NURSE PRACTITIONER

## 2022-01-01 PROCEDURE — 82272 OCCULT BLD FECES 1-3 TESTS: CPT

## 2022-01-01 PROCEDURE — 82378 CARCINOEMBRYONIC ANTIGEN: CPT

## 2022-01-01 PROCEDURE — 87077 CULTURE AEROBIC IDENTIFY: CPT

## 2022-01-01 PROCEDURE — 30233N1 TRANSFUSION OF NONAUTOLOGOUS RED BLOOD CELLS INTO PERIPHERAL VEIN, PERCUTANEOUS APPROACH: ICD-10-PCS | Performed by: ANESTHESIOLOGY

## 2022-01-01 PROCEDURE — 99233 SBSQ HOSP IP/OBS HIGH 50: CPT | Performed by: NURSE PRACTITIONER

## 2022-01-01 RX ORDER — DEXTROSE MONOHYDRATE 50 MG/ML
10 INJECTION, SOLUTION INTRAVENOUS CONTINUOUS
Refills: 0 | Status: SHIPPED | COMMUNITY
Start: 2022-01-01

## 2022-01-01 RX ORDER — DILTIAZEM HYDROCHLORIDE 120 MG/1
120 CAPSULE, COATED, EXTENDED RELEASE ORAL DAILY
Status: DISCONTINUED | OUTPATIENT
Start: 2022-01-01 | End: 2022-01-01 | Stop reason: HOSPADM

## 2022-01-01 RX ORDER — HEPARIN SODIUM 10000 [USP'U]/100ML
18-36 INJECTION, SOLUTION INTRAVENOUS
Status: DISCONTINUED | OUTPATIENT
Start: 2022-01-01 | End: 2022-01-01 | Stop reason: HOSPADM

## 2022-01-01 RX ORDER — POLYETHYLENE GLYCOL 3350 17 G/17G
17 POWDER, FOR SOLUTION ORAL DAILY PRN
Status: DISCONTINUED | OUTPATIENT
Start: 2022-01-01 | End: 2022-01-01 | Stop reason: HOSPADM

## 2022-01-01 RX ORDER — ONDANSETRON 2 MG/ML
4 INJECTION INTRAMUSCULAR; INTRAVENOUS
Status: DISCONTINUED | OUTPATIENT
Start: 2022-01-01 | End: 2022-01-01 | Stop reason: HOSPADM

## 2022-01-01 RX ORDER — MIDAZOLAM HYDROCHLORIDE 1 MG/ML
2 INJECTION, SOLUTION INTRAMUSCULAR; INTRAVENOUS EVERY 4 HOURS
Status: DISCONTINUED | OUTPATIENT
Start: 2022-01-01 | End: 2022-01-01

## 2022-01-01 RX ORDER — MORPHINE SULFATE 2 MG/ML
1 INJECTION, SOLUTION INTRAMUSCULAR; INTRAVENOUS EVERY 4 HOURS
Status: DISCONTINUED | OUTPATIENT
Start: 2022-01-01 | End: 2022-01-01 | Stop reason: HOSPADM

## 2022-01-01 RX ORDER — ACETAMINOPHEN 650 MG/1
650 SUPPOSITORY RECTAL
Status: DISCONTINUED | OUTPATIENT
Start: 2022-01-01 | End: 2022-01-01 | Stop reason: HOSPADM

## 2022-01-01 RX ORDER — GLYCOPYRROLATE 0.2 MG/ML
0.2 INJECTION INTRAMUSCULAR; INTRAVENOUS
Status: DISCONTINUED | OUTPATIENT
Start: 2022-01-01 | End: 2022-01-01 | Stop reason: HOSPADM

## 2022-01-01 RX ORDER — ALBUMIN HUMAN 250 G/1000ML
25 SOLUTION INTRAVENOUS ONCE
Status: COMPLETED | OUTPATIENT
Start: 2022-01-01 | End: 2022-01-01

## 2022-01-01 RX ORDER — MORPHINE SULFATE 2 MG/ML
2 INJECTION, SOLUTION INTRAMUSCULAR; INTRAVENOUS
Status: COMPLETED | OUTPATIENT
Start: 2022-01-01 | End: 2022-01-01

## 2022-01-01 RX ORDER — MORPHINE SULFATE 2 MG/ML
1 INJECTION, SOLUTION INTRAMUSCULAR; INTRAVENOUS EVERY 4 HOURS
Refills: 0 | Status: SHIPPED | COMMUNITY
Start: 2022-01-01

## 2022-01-01 RX ORDER — MIDAZOLAM HYDROCHLORIDE 1 MG/ML
1 INJECTION, SOLUTION INTRAMUSCULAR; INTRAVENOUS
Status: DISCONTINUED | OUTPATIENT
Start: 2022-01-01 | End: 2022-01-01 | Stop reason: HOSPADM

## 2022-01-01 RX ORDER — SODIUM CHLORIDE 9 MG/ML
125 INJECTION, SOLUTION INTRAVENOUS CONTINUOUS
Status: DISCONTINUED | OUTPATIENT
Start: 2022-01-01 | End: 2022-01-01

## 2022-01-01 RX ORDER — MEMANTINE HYDROCHLORIDE 10 MG/1
TABLET ORAL 2 TIMES DAILY
COMMUNITY
End: 2022-01-01

## 2022-01-01 RX ORDER — MORPHINE SULFATE 2 MG/ML
3 INJECTION, SOLUTION INTRAMUSCULAR; INTRAVENOUS
Status: DISCONTINUED | OUTPATIENT
Start: 2022-01-01 | End: 2022-01-01 | Stop reason: HOSPADM

## 2022-01-01 RX ORDER — DONEPEZIL HYDROCHLORIDE 5 MG/1
5 TABLET, FILM COATED ORAL
Status: DISCONTINUED | OUTPATIENT
Start: 2022-01-01 | End: 2022-01-01 | Stop reason: HOSPADM

## 2022-01-01 RX ORDER — SODIUM CHLORIDE 9 MG/ML
250 INJECTION, SOLUTION INTRAVENOUS AS NEEDED
Status: DISCONTINUED | OUTPATIENT
Start: 2022-01-01 | End: 2022-01-01

## 2022-01-01 RX ORDER — HEPARIN SODIUM 5000 [USP'U]/ML
5000 INJECTION, SOLUTION INTRAVENOUS; SUBCUTANEOUS EVERY 8 HOURS
Status: DISCONTINUED | OUTPATIENT
Start: 2022-01-01 | End: 2022-01-01

## 2022-01-01 RX ORDER — MORPHINE SULFATE 4 MG/ML
4 INJECTION, SOLUTION INTRAMUSCULAR; INTRAVENOUS
Status: DISCONTINUED | OUTPATIENT
Start: 2022-01-01 | End: 2022-01-01 | Stop reason: HOSPADM

## 2022-01-01 RX ORDER — HEPARIN SODIUM 10000 [USP'U]/100ML
12-25 INJECTION, SOLUTION INTRAVENOUS
Status: SHIPPED | COMMUNITY
Start: 2022-01-01

## 2022-01-01 RX ORDER — DEXTROSE MONOHYDRATE AND SODIUM CHLORIDE 5; .45 G/100ML; G/100ML
125 INJECTION, SOLUTION INTRAVENOUS CONTINUOUS
Status: DISCONTINUED | OUTPATIENT
Start: 2022-01-01 | End: 2022-01-01

## 2022-01-01 RX ORDER — LANOLIN ALCOHOL/MO/W.PET/CERES
400 CREAM (GRAM) TOPICAL DAILY
COMMUNITY
End: 2022-01-01

## 2022-01-01 RX ORDER — SODIUM CHLORIDE 9 MG/ML
250 INJECTION, SOLUTION INTRAVENOUS AS NEEDED
Status: DISCONTINUED | OUTPATIENT
Start: 2022-01-01 | End: 2022-01-01 | Stop reason: HOSPADM

## 2022-01-01 RX ORDER — MORPHINE SULFATE 2 MG/ML
2 INJECTION, SOLUTION INTRAMUSCULAR; INTRAVENOUS
Refills: 0 | Status: SHIPPED | COMMUNITY
Start: 2022-01-01

## 2022-01-01 RX ORDER — DILTIAZEM HYDROCHLORIDE 5 MG/ML
10 INJECTION INTRAVENOUS ONCE
Status: COMPLETED | OUTPATIENT
Start: 2022-01-01 | End: 2022-01-01

## 2022-01-01 RX ORDER — DIPHENHYDRAMINE HYDROCHLORIDE 50 MG/ML
12.5 INJECTION, SOLUTION INTRAMUSCULAR; INTRAVENOUS
Status: COMPLETED | OUTPATIENT
Start: 2022-01-01 | End: 2022-01-01

## 2022-01-01 RX ORDER — MEMANTINE HYDROCHLORIDE 10 MG/1
5 TABLET ORAL 2 TIMES DAILY
Status: DISCONTINUED | OUTPATIENT
Start: 2022-01-01 | End: 2022-01-01 | Stop reason: HOSPADM

## 2022-01-01 RX ORDER — FACIAL-BODY WIPES
10 EACH TOPICAL DAILY PRN
Status: DISCONTINUED | OUTPATIENT
Start: 2022-01-01 | End: 2022-01-01 | Stop reason: HOSPADM

## 2022-01-01 RX ORDER — MORPHINE SULFATE 4 MG/ML
4 INJECTION, SOLUTION INTRAMUSCULAR; INTRAVENOUS
Status: COMPLETED | OUTPATIENT
Start: 2022-01-01 | End: 2022-01-01

## 2022-01-01 RX ORDER — LEVOFLOXACIN 5 MG/ML
500 INJECTION, SOLUTION INTRAVENOUS EVERY 24 HOURS
Status: DISCONTINUED | OUTPATIENT
Start: 2022-01-01 | End: 2022-01-01

## 2022-01-01 RX ORDER — VANCOMYCIN/0.9 % SOD CHLORIDE 1.5G/250ML
1500 PLASTIC BAG, INJECTION (ML) INTRAVENOUS ONCE
Status: COMPLETED | OUTPATIENT
Start: 2022-01-01 | End: 2022-01-01

## 2022-01-01 RX ORDER — GLYCOPYRROLATE 0.2 MG/ML
0.2 INJECTION INTRAMUSCULAR; INTRAVENOUS EVERY 4 HOURS
Status: DISCONTINUED | OUTPATIENT
Start: 2022-01-01 | End: 2022-01-01

## 2022-01-01 RX ORDER — WARFARIN 7.5 MG/1
7.5 TABLET ORAL
COMMUNITY
End: 2022-01-01

## 2022-01-01 RX ORDER — SODIUM CHLORIDE 0.9 % (FLUSH) 0.9 %
5-40 SYRINGE (ML) INJECTION EVERY 8 HOURS
Status: DISCONTINUED | OUTPATIENT
Start: 2022-01-01 | End: 2022-01-01 | Stop reason: HOSPADM

## 2022-01-01 RX ORDER — MORPHINE SULFATE 2 MG/ML
2 INJECTION, SOLUTION INTRAMUSCULAR; INTRAVENOUS EVERY 4 HOURS
Status: DISCONTINUED | OUTPATIENT
Start: 2022-01-01 | End: 2022-01-01

## 2022-01-01 RX ORDER — DIPHENHYDRAMINE HYDROCHLORIDE 50 MG/ML
12.5 INJECTION, SOLUTION INTRAMUSCULAR; INTRAVENOUS ONCE
Status: COMPLETED | OUTPATIENT
Start: 2022-01-01 | End: 2022-01-01

## 2022-01-01 RX ORDER — DEXTROSE, SODIUM CHLORIDE, AND POTASSIUM CHLORIDE 5; .45; .15 G/100ML; G/100ML; G/100ML
125 INJECTION INTRAVENOUS CONTINUOUS
Status: DISCONTINUED | OUTPATIENT
Start: 2022-01-01 | End: 2022-01-01

## 2022-01-01 RX ORDER — GLYCOPYRROLATE 0.2 MG/ML
0.2 INJECTION INTRAMUSCULAR; INTRAVENOUS
Status: SHIPPED | COMMUNITY
Start: 2022-01-01

## 2022-01-01 RX ORDER — MORPHINE SULFATE 2 MG/ML
2 INJECTION, SOLUTION INTRAMUSCULAR; INTRAVENOUS
Status: DISCONTINUED | OUTPATIENT
Start: 2022-01-01 | End: 2022-01-01

## 2022-01-01 RX ORDER — DILTIAZEM HYDROCHLORIDE 120 MG/1
120 CAPSULE, EXTENDED RELEASE ORAL DAILY
COMMUNITY
End: 2022-01-01

## 2022-01-01 RX ORDER — ACETAMINOPHEN 325 MG/1
650 TABLET ORAL
Status: DISCONTINUED | OUTPATIENT
Start: 2022-01-01 | End: 2022-01-01 | Stop reason: HOSPADM

## 2022-01-01 RX ORDER — DEXTROSE MONOHYDRATE AND SODIUM CHLORIDE 5; .45 G/100ML; G/100ML
125 INJECTION, SOLUTION INTRAVENOUS CONTINUOUS
Status: DISCONTINUED | OUTPATIENT
Start: 2022-01-01 | End: 2022-01-01 | Stop reason: HOSPADM

## 2022-01-01 RX ORDER — FOLIC ACID 1 MG/1
1 TABLET ORAL DAILY
Status: DISCONTINUED | OUTPATIENT
Start: 2022-01-01 | End: 2022-01-01 | Stop reason: HOSPADM

## 2022-01-01 RX ORDER — SODIUM CHLORIDE 0.9 % (FLUSH) 0.9 %
5 SYRINGE (ML) INJECTION AS NEEDED
Status: DISCONTINUED | OUTPATIENT
Start: 2022-01-01 | End: 2022-01-01 | Stop reason: HOSPADM

## 2022-01-01 RX ORDER — FERROUS GLUCONATE 324(38)MG
TABLET ORAL
COMMUNITY
End: 2022-01-01

## 2022-01-01 RX ORDER — OXYCODONE AND ACETAMINOPHEN 5; 325 MG/1; MG/1
1 TABLET ORAL
Status: DISCONTINUED | OUTPATIENT
Start: 2022-01-01 | End: 2022-01-01 | Stop reason: HOSPADM

## 2022-01-01 RX ORDER — WARFARIN 10 MG/1
10 TABLET ORAL
COMMUNITY
End: 2022-01-01

## 2022-01-01 RX ORDER — SODIUM CHLORIDE 0.9 % (FLUSH) 0.9 %
5-40 SYRINGE (ML) INJECTION AS NEEDED
Status: DISCONTINUED | OUTPATIENT
Start: 2022-01-01 | End: 2022-01-01 | Stop reason: HOSPADM

## 2022-01-01 RX ORDER — MIDAZOLAM HYDROCHLORIDE 1 MG/ML
1 INJECTION, SOLUTION INTRAMUSCULAR; INTRAVENOUS
Qty: 1 ML | Refills: 0 | Status: SHIPPED | COMMUNITY
Start: 2022-01-01

## 2022-01-01 RX ORDER — ONDANSETRON 4 MG/1
4 TABLET, ORALLY DISINTEGRATING ORAL
Status: DISCONTINUED | OUTPATIENT
Start: 2022-01-01 | End: 2022-01-01 | Stop reason: HOSPADM

## 2022-01-01 RX ORDER — DEXTROSE MONOHYDRATE 50 MG/ML
10 INJECTION, SOLUTION INTRAVENOUS CONTINUOUS
Status: DISCONTINUED | OUTPATIENT
Start: 2022-01-01 | End: 2022-01-01 | Stop reason: HOSPADM

## 2022-01-01 RX ORDER — DONEPEZIL HYDROCHLORIDE 5 MG/1
TABLET, FILM COATED ORAL
COMMUNITY
End: 2022-01-01

## 2022-01-01 RX ORDER — MORPHINE SULFATE 4 MG/ML
4 INJECTION, SOLUTION INTRAMUSCULAR; INTRAVENOUS
Status: DISCONTINUED | OUTPATIENT
Start: 2022-01-01 | End: 2022-01-01

## 2022-01-01 RX ORDER — MEMANTINE HYDROCHLORIDE 5 MG/1
10 TABLET ORAL 2 TIMES DAILY
Status: DISCONTINUED | OUTPATIENT
Start: 2022-01-01 | End: 2022-01-01 | Stop reason: HOSPADM

## 2022-01-01 RX ORDER — MORPHINE SULFATE 2 MG/ML
2 INJECTION, SOLUTION INTRAMUSCULAR; INTRAVENOUS
Status: DISCONTINUED | OUTPATIENT
Start: 2022-01-01 | End: 2022-01-01 | Stop reason: HOSPADM

## 2022-01-01 RX ORDER — MIDAZOLAM HYDROCHLORIDE 1 MG/ML
2 INJECTION, SOLUTION INTRAMUSCULAR; INTRAVENOUS
Status: DISCONTINUED | OUTPATIENT
Start: 2022-01-01 | End: 2022-01-01 | Stop reason: HOSPADM

## 2022-01-01 RX ORDER — LANOLIN ALCOHOL/MO/W.PET/CERES
325 CREAM (GRAM) TOPICAL
Status: DISCONTINUED | OUTPATIENT
Start: 2022-01-01 | End: 2022-01-01 | Stop reason: HOSPADM

## 2022-01-01 RX ORDER — BALSAM PERU/CASTOR OIL
OINTMENT (GRAM) TOPICAL 2 TIMES DAILY
Status: SHIPPED | COMMUNITY
Start: 2022-01-01

## 2022-01-01 RX ORDER — ONDANSETRON 2 MG/ML
4 INJECTION INTRAMUSCULAR; INTRAVENOUS
Status: COMPLETED | OUTPATIENT
Start: 2022-01-01 | End: 2022-01-01

## 2022-01-01 RX ORDER — GLYCOPYRROLATE 0.2 MG/ML
0.2 INJECTION INTRAMUSCULAR; INTRAVENOUS
Status: DISCONTINUED | OUTPATIENT
Start: 2022-01-01 | End: 2022-01-01

## 2022-01-01 RX ORDER — HEPARIN SODIUM 10000 [USP'U]/100ML
18-36 INJECTION, SOLUTION INTRAVENOUS
Status: DISCONTINUED | OUTPATIENT
Start: 2022-01-01 | End: 2022-01-01

## 2022-01-01 RX ORDER — HEPARIN SODIUM 10000 [USP'U]/100ML
12-25 INJECTION, SOLUTION INTRAVENOUS
Status: DISCONTINUED | OUTPATIENT
Start: 2022-01-01 | End: 2022-01-01 | Stop reason: HOSPADM

## 2022-01-01 RX ORDER — DILTIAZEM HYDROCHLORIDE 120 MG/1
120 CAPSULE, EXTENDED RELEASE ORAL DAILY
Status: DISCONTINUED | OUTPATIENT
Start: 2022-01-01 | End: 2022-01-01

## 2022-01-01 RX ORDER — PHYTONADIONE 10 MG/ML
5 INJECTION, EMULSION INTRAMUSCULAR; INTRAVENOUS; SUBCUTANEOUS
Status: DISCONTINUED | OUTPATIENT
Start: 2022-01-01 | End: 2022-01-01

## 2022-01-01 RX ORDER — MORPHINE SULFATE 2 MG/ML
1 INJECTION, SOLUTION INTRAMUSCULAR; INTRAVENOUS
Status: DISCONTINUED | OUTPATIENT
Start: 2022-01-01 | End: 2022-01-01

## 2022-01-01 RX ORDER — DIGOXIN 0.25 MG/ML
125 INJECTION INTRAMUSCULAR; INTRAVENOUS
Status: COMPLETED | OUTPATIENT
Start: 2022-01-01 | End: 2022-01-01

## 2022-01-01 RX ORDER — BALSAM PERU/CASTOR OIL
OINTMENT (GRAM) TOPICAL 2 TIMES DAILY
Status: DISCONTINUED | OUTPATIENT
Start: 2022-01-01 | End: 2022-01-01 | Stop reason: HOSPADM

## 2022-01-01 RX ADMIN — SODIUM CHLORIDE, PRESERVATIVE FREE 10 ML: 5 INJECTION INTRAVENOUS at 05:37

## 2022-01-01 RX ADMIN — MORPHINE SULFATE 4 MG: 4 INJECTION, SOLUTION INTRAMUSCULAR; INTRAVENOUS at 08:23

## 2022-01-01 RX ADMIN — SODIUM CHLORIDE 250 ML: 9 INJECTION, SOLUTION INTRAVENOUS at 03:48

## 2022-01-01 RX ADMIN — SODIUM CHLORIDE 100 ML/HR: 9 INJECTION, SOLUTION INTRAVENOUS at 02:05

## 2022-01-01 RX ADMIN — MORPHINE SULFATE 4 MG: 4 INJECTION, SOLUTION INTRAMUSCULAR; INTRAVENOUS at 04:28

## 2022-01-01 RX ADMIN — MORPHINE SULFATE 4 MG: 4 INJECTION, SOLUTION INTRAMUSCULAR; INTRAVENOUS at 10:19

## 2022-01-01 RX ADMIN — ONDANSETRON 4 MG: 2 INJECTION INTRAMUSCULAR; INTRAVENOUS at 19:11

## 2022-01-01 RX ADMIN — FERROUS SULFATE TAB 325 MG (65 MG ELEMENTAL FE) 325 MG: 325 (65 FE) TAB at 06:33

## 2022-01-01 RX ADMIN — MIDAZOLAM HYDROCHLORIDE 2 MG: 1 INJECTION, SOLUTION INTRAMUSCULAR; INTRAVENOUS at 01:18

## 2022-01-01 RX ADMIN — SODIUM CHLORIDE 500 ML: 9 INJECTION, SOLUTION INTRAVENOUS at 20:05

## 2022-01-01 RX ADMIN — MORPHINE SULFATE 1 MG: 2 INJECTION, SOLUTION INTRAMUSCULAR; INTRAVENOUS at 09:53

## 2022-01-01 RX ADMIN — SODIUM CHLORIDE, PRESERVATIVE FREE 10 ML: 5 INJECTION INTRAVENOUS at 05:50

## 2022-01-01 RX ADMIN — DEXTROSE MONOHYDRATE 50 ML/HR: 50 INJECTION, SOLUTION INTRAVENOUS at 13:18

## 2022-01-01 RX ADMIN — DEXTROSE AND SODIUM CHLORIDE 125 ML/HR: 5; 450 INJECTION, SOLUTION INTRAVENOUS at 12:21

## 2022-01-01 RX ADMIN — MORPHINE SULFATE 1 MG: 2 INJECTION, SOLUTION INTRAMUSCULAR; INTRAVENOUS at 23:01

## 2022-01-01 RX ADMIN — SODIUM CHLORIDE, PRESERVATIVE FREE 10 ML: 5 INJECTION INTRAVENOUS at 14:20

## 2022-01-01 RX ADMIN — MORPHINE SULFATE 2 MG: 2 INJECTION, SOLUTION INTRAMUSCULAR; INTRAVENOUS at 19:04

## 2022-01-01 RX ADMIN — MORPHINE SULFATE 4 MG: 4 INJECTION, SOLUTION INTRAMUSCULAR; INTRAVENOUS at 16:28

## 2022-01-01 RX ADMIN — MORPHINE SULFATE 1 MG: 2 INJECTION, SOLUTION INTRAMUSCULAR; INTRAVENOUS at 18:57

## 2022-01-01 RX ADMIN — DILTIAZEM HYDROCHLORIDE 7.5 MG/HR: 5 INJECTION INTRAVENOUS at 07:01

## 2022-01-01 RX ADMIN — MORPHINE SULFATE 2 MG: 2 INJECTION, SOLUTION INTRAMUSCULAR; INTRAVENOUS at 22:59

## 2022-01-01 RX ADMIN — FOLIC ACID 1 MG: 1 TABLET ORAL at 08:01

## 2022-01-01 RX ADMIN — GLYCOPYRROLATE 0.2 MG: 0.2 INJECTION INTRAMUSCULAR; INTRAVENOUS at 18:20

## 2022-01-01 RX ADMIN — MORPHINE SULFATE 2 MG: 2 INJECTION, SOLUTION INTRAMUSCULAR; INTRAVENOUS at 14:39

## 2022-01-01 RX ADMIN — MORPHINE SULFATE 2 MG: 2 INJECTION, SOLUTION INTRAMUSCULAR; INTRAVENOUS at 08:16

## 2022-01-01 RX ADMIN — SODIUM CHLORIDE, PRESERVATIVE FREE 10 ML: 5 INJECTION INTRAVENOUS at 14:55

## 2022-01-01 RX ADMIN — MORPHINE SULFATE 2 MG: 2 INJECTION, SOLUTION INTRAMUSCULAR; INTRAVENOUS at 12:57

## 2022-01-01 RX ADMIN — DILTIAZEM HYDROCHLORIDE 10 MG: 5 INJECTION, SOLUTION INTRAVENOUS at 14:28

## 2022-01-01 RX ADMIN — SODIUM CHLORIDE 75 ML/HR: 9 INJECTION, SOLUTION INTRAVENOUS at 11:02

## 2022-01-01 RX ADMIN — SODIUM CHLORIDE, PRESERVATIVE FREE 10 ML: 5 INJECTION INTRAVENOUS at 21:57

## 2022-01-01 RX ADMIN — Medication: at 12:56

## 2022-01-01 RX ADMIN — MORPHINE SULFATE 4 MG: 4 INJECTION, SOLUTION INTRAMUSCULAR; INTRAVENOUS at 08:09

## 2022-01-01 RX ADMIN — HEPARIN SODIUM 5000 UNITS: 5000 INJECTION INTRAVENOUS; SUBCUTANEOUS at 10:23

## 2022-01-01 RX ADMIN — IOPAMIDOL 100 ML: 612 INJECTION, SOLUTION INTRAVENOUS at 07:08

## 2022-01-01 RX ADMIN — DONEPEZIL HYDROCHLORIDE 5 MG: 5 TABLET, FILM COATED ORAL at 21:03

## 2022-01-01 RX ADMIN — GLYCOPYRROLATE 0.2 MG: 0.2 INJECTION INTRAMUSCULAR; INTRAVENOUS at 19:26

## 2022-01-01 RX ADMIN — HEPARIN SODIUM 18 UNITS/KG/HR: 10000 INJECTION, SOLUTION INTRAVENOUS at 10:43

## 2022-01-01 RX ADMIN — MORPHINE SULFATE 2 MG: 2 INJECTION, SOLUTION INTRAMUSCULAR; INTRAVENOUS at 10:04

## 2022-01-01 RX ADMIN — MORPHINE SULFATE 4 MG: 4 INJECTION, SOLUTION INTRAMUSCULAR; INTRAVENOUS at 19:10

## 2022-01-01 RX ADMIN — AMIODARONE HYDROCHLORIDE 0.5 MG/MIN: 50 INJECTION, SOLUTION INTRAVENOUS at 05:40

## 2022-01-01 RX ADMIN — DEXTROSE AND SODIUM CHLORIDE 125 ML/HR: 5; 450 INJECTION, SOLUTION INTRAVENOUS at 10:37

## 2022-01-01 RX ADMIN — SODIUM CHLORIDE, PRESERVATIVE FREE 10 ML: 5 INJECTION INTRAVENOUS at 14:25

## 2022-01-01 RX ADMIN — MIDAZOLAM HYDROCHLORIDE 2 MG: 1 INJECTION, SOLUTION INTRAMUSCULAR; INTRAVENOUS at 10:08

## 2022-01-01 RX ADMIN — SODIUM CHLORIDE, POTASSIUM CHLORIDE, SODIUM LACTATE AND CALCIUM CHLORIDE 1000 ML: 600; 310; 30; 20 INJECTION, SOLUTION INTRAVENOUS at 13:22

## 2022-01-01 RX ADMIN — MORPHINE SULFATE 4 MG: 4 INJECTION, SOLUTION INTRAMUSCULAR; INTRAVENOUS at 23:20

## 2022-01-01 RX ADMIN — MORPHINE SULFATE 4 MG: 4 INJECTION, SOLUTION INTRAMUSCULAR; INTRAVENOUS at 05:53

## 2022-01-01 RX ADMIN — SODIUM CHLORIDE, PRESERVATIVE FREE 10 ML: 5 INJECTION INTRAVENOUS at 22:12

## 2022-01-01 RX ADMIN — DILTIAZEM HYDROCHLORIDE 10 MG: 5 INJECTION INTRAVENOUS at 08:43

## 2022-01-01 RX ADMIN — DILTIAZEM HYDROCHLORIDE 5 MG/HR: 5 INJECTION INTRAVENOUS at 19:16

## 2022-01-01 RX ADMIN — SODIUM CHLORIDE, PRESERVATIVE FREE 10 ML: 5 INJECTION INTRAVENOUS at 21:21

## 2022-01-01 RX ADMIN — AMIODARONE HYDROCHLORIDE 0.5 MG/MIN: 50 INJECTION, SOLUTION INTRAVENOUS at 16:57

## 2022-01-01 RX ADMIN — SODIUM CHLORIDE 500 ML: 9 INJECTION, SOLUTION INTRAVENOUS at 09:00

## 2022-01-01 RX ADMIN — FERROUS SULFATE TAB 325 MG (65 MG ELEMENTAL FE) 325 MG: 325 (65 FE) TAB at 08:49

## 2022-01-01 RX ADMIN — DILTIAZEM HYDROCHLORIDE 120 MG: 120 CAPSULE, COATED, EXTENDED RELEASE ORAL at 08:01

## 2022-01-01 RX ADMIN — MEMANTINE 10 MG: 5 TABLET ORAL at 08:01

## 2022-01-01 RX ADMIN — SODIUM CHLORIDE 75 ML/HR: 9 INJECTION, SOLUTION INTRAVENOUS at 00:25

## 2022-01-01 RX ADMIN — FOLIC ACID 1 MG: 1 TABLET ORAL at 08:46

## 2022-01-01 RX ADMIN — FOLIC ACID 1 MG: 1 TABLET ORAL at 10:19

## 2022-01-01 RX ADMIN — MORPHINE SULFATE 2 MG: 2 INJECTION, SOLUTION INTRAMUSCULAR; INTRAVENOUS at 02:06

## 2022-01-01 RX ADMIN — GLYCOPYRROLATE 0.2 MG: 0.2 INJECTION INTRAMUSCULAR; INTRAVENOUS at 01:30

## 2022-01-01 RX ADMIN — SODIUM CHLORIDE 500 ML: 9 INJECTION, SOLUTION INTRAVENOUS at 11:12

## 2022-01-01 RX ADMIN — GLYCOPYRROLATE 0.2 MG: 0.2 INJECTION INTRAMUSCULAR; INTRAVENOUS at 13:11

## 2022-01-01 RX ADMIN — Medication: at 17:34

## 2022-01-01 RX ADMIN — SODIUM CHLORIDE 100 ML/HR: 9 INJECTION, SOLUTION INTRAVENOUS at 19:49

## 2022-01-01 RX ADMIN — SODIUM CHLORIDE 250 ML: 9 INJECTION, SOLUTION INTRAVENOUS at 22:50

## 2022-01-01 RX ADMIN — MIDAZOLAM HYDROCHLORIDE 2 MG: 1 INJECTION, SOLUTION INTRAMUSCULAR; INTRAVENOUS at 14:39

## 2022-01-01 RX ADMIN — SODIUM CHLORIDE 100 ML/HR: 9 INJECTION, SOLUTION INTRAVENOUS at 08:15

## 2022-01-01 RX ADMIN — MORPHINE SULFATE 1 MG: 2 INJECTION, SOLUTION INTRAMUSCULAR; INTRAVENOUS at 18:36

## 2022-01-01 RX ADMIN — MORPHINE SULFATE 4 MG: 4 INJECTION, SOLUTION INTRAMUSCULAR; INTRAVENOUS at 03:55

## 2022-01-01 RX ADMIN — MORPHINE SULFATE 4 MG: 4 INJECTION, SOLUTION INTRAMUSCULAR; INTRAVENOUS at 03:19

## 2022-01-01 RX ADMIN — MEMANTINE 10 MG: 5 TABLET ORAL at 10:19

## 2022-01-01 RX ADMIN — MORPHINE SULFATE 2 MG: 2 INJECTION, SOLUTION INTRAMUSCULAR; INTRAVENOUS at 11:35

## 2022-01-01 RX ADMIN — DIPHENHYDRAMINE HYDROCHLORIDE 12.5 MG: 50 INJECTION, SOLUTION INTRAMUSCULAR; INTRAVENOUS at 03:16

## 2022-01-01 RX ADMIN — Medication 5 MG: at 09:31

## 2022-01-01 RX ADMIN — SODIUM CHLORIDE 50 ML/HR: 9 INJECTION, SOLUTION INTRAVENOUS at 01:07

## 2022-01-01 RX ADMIN — MORPHINE SULFATE 4 MG: 4 INJECTION, SOLUTION INTRAMUSCULAR; INTRAVENOUS at 21:22

## 2022-01-01 RX ADMIN — DILTIAZEM HYDROCHLORIDE 2.5 MG/HR: 5 INJECTION INTRAVENOUS at 22:45

## 2022-01-01 RX ADMIN — SODIUM CHLORIDE, PRESERVATIVE FREE 10 ML: 5 INJECTION INTRAVENOUS at 05:53

## 2022-01-01 RX ADMIN — MORPHINE SULFATE 1 MG: 2 INJECTION, SOLUTION INTRAMUSCULAR; INTRAVENOUS at 09:41

## 2022-01-01 RX ADMIN — DILTIAZEM HYDROCHLORIDE 12.5 MG/HR: 5 INJECTION INTRAVENOUS at 14:55

## 2022-01-01 RX ADMIN — MORPHINE SULFATE 4 MG: 4 INJECTION, SOLUTION INTRAMUSCULAR; INTRAVENOUS at 08:53

## 2022-01-01 RX ADMIN — DILTIAZEM HYDROCHLORIDE 12.5 MG/HR: 5 INJECTION INTRAVENOUS at 15:18

## 2022-01-01 RX ADMIN — SODIUM CHLORIDE, PRESERVATIVE FREE 10 ML: 5 INJECTION INTRAVENOUS at 21:27

## 2022-01-01 RX ADMIN — MORPHINE SULFATE 2 MG: 2 INJECTION, SOLUTION INTRAMUSCULAR; INTRAVENOUS at 04:52

## 2022-01-01 RX ADMIN — MEMANTINE 10 MG: 5 TABLET ORAL at 08:46

## 2022-01-01 RX ADMIN — MORPHINE SULFATE 4 MG: 4 INJECTION, SOLUTION INTRAMUSCULAR; INTRAVENOUS at 14:25

## 2022-01-01 RX ADMIN — DIPHENHYDRAMINE HYDROCHLORIDE 12.5 MG: 50 INJECTION, SOLUTION INTRAMUSCULAR; INTRAVENOUS at 19:25

## 2022-01-01 RX ADMIN — DONEPEZIL HYDROCHLORIDE 5 MG: 5 TABLET, FILM COATED ORAL at 01:07

## 2022-01-01 RX ADMIN — MEMANTINE 10 MG: 5 TABLET ORAL at 18:38

## 2022-01-01 RX ADMIN — MORPHINE SULFATE 4 MG: 4 INJECTION, SOLUTION INTRAMUSCULAR; INTRAVENOUS at 14:20

## 2022-01-01 RX ADMIN — HEPARIN SODIUM 5000 UNITS: 5000 INJECTION INTRAVENOUS; SUBCUTANEOUS at 01:06

## 2022-01-01 RX ADMIN — DILTIAZEM HYDROCHLORIDE 10 MG: 5 INJECTION, SOLUTION INTRAVENOUS at 20:07

## 2022-01-01 RX ADMIN — SODIUM CHLORIDE, PRESERVATIVE FREE 10 ML: 5 INJECTION INTRAVENOUS at 21:06

## 2022-01-01 RX ADMIN — SODIUM CHLORIDE 100 ML/HR: 9 INJECTION, SOLUTION INTRAVENOUS at 20:45

## 2022-01-01 RX ADMIN — DEXTROSE MONOHYDRATE 150 MG: 50 INJECTION, SOLUTION INTRAVENOUS at 08:54

## 2022-01-01 RX ADMIN — DEXTROSE AND SODIUM CHLORIDE 125 ML/HR: 5; 450 INJECTION, SOLUTION INTRAVENOUS at 03:11

## 2022-01-01 RX ADMIN — DILTIAZEM HYDROCHLORIDE 12.5 MG/HR: 5 INJECTION INTRAVENOUS at 18:53

## 2022-01-01 RX ADMIN — MORPHINE SULFATE 4 MG: 4 INJECTION, SOLUTION INTRAMUSCULAR; INTRAVENOUS at 19:48

## 2022-01-01 RX ADMIN — COLLAGENASE SANTYL: 250 OINTMENT TOPICAL at 12:56

## 2022-01-01 RX ADMIN — ALBUMIN (HUMAN) 25 G: 0.25 INJECTION, SOLUTION INTRAVENOUS at 05:47

## 2022-01-01 RX ADMIN — MORPHINE SULFATE 4 MG: 4 INJECTION, SOLUTION INTRAMUSCULAR; INTRAVENOUS at 20:40

## 2022-01-01 RX ADMIN — VANCOMYCIN HYDROCHLORIDE 1500 MG: 10 INJECTION, POWDER, LYOPHILIZED, FOR SOLUTION INTRAVENOUS at 12:56

## 2022-01-01 RX ADMIN — MORPHINE SULFATE 4 MG: 4 INJECTION, SOLUTION INTRAMUSCULAR; INTRAVENOUS at 17:50

## 2022-01-01 RX ADMIN — MORPHINE SULFATE 4 MG: 4 INJECTION, SOLUTION INTRAMUSCULAR; INTRAVENOUS at 13:49

## 2022-01-01 RX ADMIN — SODIUM CHLORIDE 75 ML/HR: 9 INJECTION, SOLUTION INTRAVENOUS at 11:56

## 2022-01-01 RX ADMIN — HEPARIN SODIUM 5000 UNITS: 5000 INJECTION INTRAVENOUS; SUBCUTANEOUS at 09:00

## 2022-01-01 RX ADMIN — DEXTROSE MONOHYDRATE 50 ML/HR: 50 INJECTION, SOLUTION INTRAVENOUS at 08:34

## 2022-01-01 RX ADMIN — OXYCODONE AND ACETAMINOPHEN 1 TABLET: 5; 325 TABLET ORAL at 22:31

## 2022-01-01 RX ADMIN — AMIODARONE HYDROCHLORIDE 1 MG/MIN: 50 INJECTION, SOLUTION INTRAVENOUS at 10:43

## 2022-01-01 RX ADMIN — MORPHINE SULFATE 2 MG: 2 INJECTION, SOLUTION INTRAMUSCULAR; INTRAVENOUS at 06:30

## 2022-01-01 RX ADMIN — MORPHINE SULFATE 4 MG: 4 INJECTION, SOLUTION INTRAMUSCULAR; INTRAVENOUS at 00:25

## 2022-01-01 RX ADMIN — MIDAZOLAM HYDROCHLORIDE 2 MG: 1 INJECTION, SOLUTION INTRAMUSCULAR; INTRAVENOUS at 21:01

## 2022-01-01 RX ADMIN — FERROUS SULFATE TAB 325 MG (65 MG ELEMENTAL FE) 325 MG: 325 (65 FE) TAB at 06:36

## 2022-01-01 RX ADMIN — MORPHINE SULFATE 4 MG: 4 INJECTION, SOLUTION INTRAMUSCULAR; INTRAVENOUS at 22:21

## 2022-01-01 RX ADMIN — MORPHINE SULFATE 4 MG: 4 INJECTION, SOLUTION INTRAMUSCULAR; INTRAVENOUS at 21:25

## 2022-01-01 RX ADMIN — MORPHINE SULFATE 2 MG: 2 INJECTION, SOLUTION INTRAMUSCULAR; INTRAVENOUS at 16:51

## 2022-01-01 RX ADMIN — SODIUM CHLORIDE, PRESERVATIVE FREE 10 ML: 5 INJECTION INTRAVENOUS at 05:05

## 2022-01-01 RX ADMIN — HEPARIN SODIUM 5000 UNITS: 5000 INJECTION INTRAVENOUS; SUBCUTANEOUS at 17:14

## 2022-01-01 RX ADMIN — MORPHINE SULFATE 4 MG: 4 INJECTION, SOLUTION INTRAMUSCULAR; INTRAVENOUS at 14:54

## 2022-01-01 RX ADMIN — SODIUM CHLORIDE 500 ML: 900 INJECTION, SOLUTION INTRAVENOUS at 12:00

## 2022-01-01 RX ADMIN — SODIUM CHLORIDE 250 ML: 9 INJECTION, SOLUTION INTRAVENOUS at 01:15

## 2022-01-01 RX ADMIN — DONEPEZIL HYDROCHLORIDE 5 MG: 5 TABLET, FILM COATED ORAL at 21:55

## 2022-01-01 RX ADMIN — MIDAZOLAM 2 MG: 1 INJECTION, SOLUTION INTRAMUSCULAR; INTRAVENOUS at 12:55

## 2022-01-01 RX ADMIN — MORPHINE SULFATE 4 MG: 4 INJECTION, SOLUTION INTRAMUSCULAR; INTRAVENOUS at 02:55

## 2022-01-01 RX ADMIN — DEXTROSE AND SODIUM CHLORIDE 125 ML/HR: 5; 450 INJECTION, SOLUTION INTRAVENOUS at 18:33

## 2022-01-01 RX ADMIN — AMIODARONE HYDROCHLORIDE 1 MG/MIN: 50 INJECTION, SOLUTION INTRAVENOUS at 16:15

## 2022-01-01 RX ADMIN — SODIUM CHLORIDE, PRESERVATIVE FREE 10 ML: 5 INJECTION INTRAVENOUS at 13:18

## 2022-01-01 RX ADMIN — MIDAZOLAM HYDROCHLORIDE 2 MG: 1 INJECTION, SOLUTION INTRAMUSCULAR; INTRAVENOUS at 05:30

## 2022-01-01 RX ADMIN — DIGOXIN 125 MCG: 0.25 INJECTION INTRAMUSCULAR; INTRAVENOUS at 09:53

## 2022-01-01 RX ADMIN — MORPHINE SULFATE 4 MG: 4 INJECTION, SOLUTION INTRAMUSCULAR; INTRAVENOUS at 10:18

## 2022-01-01 RX ADMIN — MORPHINE SULFATE 4 MG: 4 INJECTION, SOLUTION INTRAMUSCULAR; INTRAVENOUS at 05:37

## 2022-01-01 RX ADMIN — SODIUM CHLORIDE, PRESERVATIVE FREE 10 ML: 5 INJECTION INTRAVENOUS at 13:19

## 2022-01-01 RX ADMIN — SODIUM CHLORIDE 500 ML: 9 INJECTION, SOLUTION INTRAVENOUS at 05:47

## 2022-01-01 RX ADMIN — DILTIAZEM HYDROCHLORIDE 120 MG: 120 CAPSULE, COATED, EXTENDED RELEASE ORAL at 10:19

## 2022-01-01 RX ADMIN — MORPHINE SULFATE 2 MG: 2 INJECTION, SOLUTION INTRAMUSCULAR; INTRAVENOUS at 17:30

## 2022-01-01 RX ADMIN — HEPARIN SODIUM 5000 UNITS: 5000 INJECTION INTRAVENOUS; SUBCUTANEOUS at 17:18

## 2022-01-01 RX ADMIN — MORPHINE SULFATE 4 MG: 4 INJECTION, SOLUTION INTRAMUSCULAR; INTRAVENOUS at 11:39

## 2022-01-01 RX ADMIN — DILTIAZEM HYDROCHLORIDE 120 MG: 120 CAPSULE, COATED, EXTENDED RELEASE ORAL at 08:46

## 2022-01-01 RX ADMIN — SODIUM CHLORIDE, PRESERVATIVE FREE 10 ML: 5 INJECTION INTRAVENOUS at 06:16

## 2022-01-01 RX ADMIN — MIDAZOLAM 2 MG: 1 INJECTION, SOLUTION INTRAMUSCULAR; INTRAVENOUS at 08:16

## 2022-01-01 RX ADMIN — MIDAZOLAM HYDROCHLORIDE 2 MG: 1 INJECTION, SOLUTION INTRAMUSCULAR; INTRAVENOUS at 18:16

## 2022-01-01 RX ADMIN — DILTIAZEM HYDROCHLORIDE 7.5 MG/HR: 5 INJECTION INTRAVENOUS at 04:24

## 2022-01-01 RX ADMIN — MORPHINE SULFATE 2 MG: 2 INJECTION, SOLUTION INTRAMUSCULAR; INTRAVENOUS at 18:16

## 2022-01-01 RX ADMIN — MORPHINE SULFATE 1 MG: 2 INJECTION, SOLUTION INTRAMUSCULAR; INTRAVENOUS at 07:36

## 2022-01-01 RX ADMIN — MORPHINE SULFATE 4 MG: 4 INJECTION, SOLUTION INTRAMUSCULAR; INTRAVENOUS at 07:19

## 2022-01-01 RX ADMIN — MORPHINE SULFATE 2 MG: 2 INJECTION, SOLUTION INTRAMUSCULAR; INTRAVENOUS at 00:52

## 2022-12-15 PROBLEM — S72.90XA FEMUR FRACTURE (HCC): Status: ACTIVE | Noted: 2022-01-01

## 2022-12-15 PROBLEM — I48.20 ATRIAL FIBRILLATION, CHRONIC (HCC): Status: ACTIVE | Noted: 2022-01-01

## 2022-12-16 PROBLEM — F02.80 ALZHEIMER'S DEMENTIA (HCC): Status: ACTIVE | Noted: 2022-01-01

## 2022-12-16 PROBLEM — G30.9 ALZHEIMER'S DEMENTIA (HCC): Status: ACTIVE | Noted: 2022-01-01

## 2022-12-16 NOTE — PROGRESS NOTES
Problem: Pressure Injury - Risk of  Goal: *Prevention of pressure injury  Description: Document Harshad Scale and appropriate interventions in the flowsheet. Outcome: Progressing Towards Goal  Note: Pressure Injury Interventions:  Sensory Interventions: Assess changes in LOC    Moisture Interventions: Absorbent underpads, Check for incontinence Q2 hours and as needed    Activity Interventions: Assess need for specialty bed    Mobility Interventions: Turn and reposition approx. every two hours(pillow and wedges)    Nutrition Interventions: Document food/fluid/supplement intake    Friction and Shear Interventions: Minimize layers, Transferring/repositioning devices                Problem: Patient Education: Go to Patient Education Activity  Goal: Patient/Family Education  Outcome: Progressing Towards Goal     Problem: Falls - Risk of  Goal: *Absence of Falls  Description: Document Rene Fall Risk and appropriate interventions in the flowsheet.   Outcome: Progressing Towards Goal  Note: Fall Risk Interventions:                                Problem: Patient Education: Go to Patient Education Activity  Goal: Patient/Family Education  Outcome: Progressing Towards Goal

## 2022-12-16 NOTE — CONSULTS
CARDIOLOGY CONSULTATION      REASON FOR CONSULT: Atrial fibrillation    HISTORY OF PRESENT ILLNESS:  Jose Manuel Harper is a 80y.o. year-old female with past medical history significant for A-Fib, Dementia, and blood disorder who was evaluated today due to fall that resulted in right femoral fracture. Unable to tolerate bucks traction. Awaiting hospital acceptance for surgery. She went into A-Fib RVR this morning and received Diltiazem bolus which controlled heart rate. On examination, she is lying in bed, sleeping, no acute distress with spouse at bedside; states she received pain medication. Records from hospital admission course thus far reviewed. Telemetry reviewed A-Fib 90-110s    INPATIENT MEDICATIONS:  Home medications reviewed.     Current Facility-Administered Medications:     0.9% sodium chloride infusion, 50 mL/hr, IntraVENous, CONTINUOUS, Brice GONGORA NP, Last Rate: 50 mL/hr at 12/16/22 0107, 50 mL/hr at 12/16/22 0107    morphine injection 4 mg, 4 mg, IntraVENous, Q4H PRN, Brice GONGORA NP, 4 mg at 12/16/22 1139    oxyCODONE-acetaminophen (PERCOCET) 5-325 mg per tablet 1 Tablet, 1 Tablet, Oral, Q4H PRN, Gamaliel Pan MD    dilTIAZem ER (CARDIZEM CD) capsule 120 mg, 120 mg, Oral, DAILY, Gamaliel Pan MD, 120 mg at 09/61/26 3242    folic acid (FOLVITE) tablet 1 mg, 1 mg, Oral, DAILY, Brice GONGORA NP, 1 mg at 12/16/22 0846    ferrous sulfate tablet 325 mg, 325 mg, Oral, ACB, Brice GONGORA NP, 325 mg at 12/16/22 0849    donepeziL (ARICEPT) tablet 5 mg, 5 mg, Oral, QHS, Coral Mota NP, 5 mg at 12/16/22 0107    memantine (NAMENDA) tablet 10 mg, 10 mg, Oral, BID, Brice GONGORA NP, 10 mg at 12/16/22 0846    sodium chloride (NS) flush 5-40 mL, 5-40 mL, IntraVENous, Q8H, Coral Mota NP, 10 mL at 12/16/22 0553    sodium chloride (NS) flush 5-40 mL, 5-40 mL, IntraVENous, PRN, Brice GONGORA, NP    acetaminophen (TYLENOL) tablet 650 mg, 650 mg, Oral, Q6H PRN **OR** acetaminophen (TYLENOL) suppository 650 mg, 650 mg, Rectal, Q6H PRN, Jacqualine Sale A, NP    polyethylene glycol (MIRALAX) packet 17 g, 17 g, Oral, DAILY PRN, Jacqualine Sale A, NP    ondansetron (ZOFRAN ODT) tablet 4 mg, 4 mg, Oral, Q8H PRN **OR** ondansetron (ZOFRAN) injection 4 mg, 4 mg, IntraVENous, Q6H PRN, Janki Delacruz NP     ALLERGIES:  Allergies reviewed with the patient,  Allergies   Allergen Reactions    Penicillins Anaphylaxis      Physical examination:  No apparent distress. Heart is irregular, rate and rhythm. Normal S1, S2, no murmurs are appreciated. Lungs are clear bilaterally. Abdomen is soft, nontender, normal bowel sounds. Extremities have no edema. Visit Vitals  /61   Pulse 89   Temp 98.1 °F (36.7 °C)   Resp 20   Ht 5' 7\" (1.702 m)   Wt 52.3 kg (115 lb 6.4 oz)   SpO2 98%   BMI 18.07 kg/m²       Recent labs results and imaging reviewed. Discussed case with Dr. Syd Montana and our impression and recommendations are as follows:  Atrial fibrillation: Rate is controlled 90-110s. Went into RVR and given diltiazem bolus which controlled rate. Continue Diltiazem 120mg PO. May initiate Metoprolol for rate control if needed and if blood pressure will allow. CHADS2-VASc score is 2; Coumadin on hold due to pending orthopedic surgery for right femoral fracture. INR supratherapeutic 4. 1. Continue telemetry monitoring. Keep serum potassium between 4-5 and serum magnesium > 2. Thank you for involving us in the care of this patient. Please do not hesitate to call Dr. Syd Montana if additional questions arise.     Murel Favre, FNP  12/16/2022

## 2022-12-16 NOTE — PROGRESS NOTES
Hospitalist Progress Note             Date of Service:  2022  NAME:  Analy Crook  :  1931  MRN:  [id number]      Admission Summary:   Analy Crook is a 80 y.o. female  has a past medical history of Blood disorder and Dementia ([facility] [address] Patient seen at bedside in emergency department. Patient's son is with patient and reports that patient just slid down the wall and moved in a way that her foot twisted and angulated her leg. Patient did not trip and fall it was suggested that she lost her footing. Patient's son is in room reports all of the history patient has significant Alzheimer's dementia with confusion. Patient is pleasant but at baseline per son. Patient stays at home with her . Patient is excepted at [facility] with the accepting orthopedics  [facility] and hospitalist Dr. Brittny Miller. There are no beds available at [facility] at this time patient's Coumadin will be held at this time she is on it for chronic atrial fibrillation, she will not be held n.p.o. at this time I will order regular diet.     Interval history / Subjective:     Pt in pain at femur fx, develpoed rapid afib this am     Assessment & Plan:         Femur fracture ([facility] [address]  Patient has comminuted angulated distal right femoral fracture  She is excepted at [facility] to  [facility] orthopedics with Dr. Brittny Miller as excepting hospitalist  Orthopedics not available this week in this facility  Pain control  Hold Coumadin  Unknown when patient will have surgery we will keep patient with regular diet  Deaf Smith traction     Atrial fibrillation, chronic (HCC)  Tele, coumadin on hold pre op  Cont home cardizem  Rapid afib this am, 160s, pain control, then 10mg iv cardizem bolus, now hr controlled    Inr elevation, due to coumadin  - give 5mg po vit k preop  - trend inr     Alzheimer's dementia ([facility] [address]  This is a chronic problem  Continue Aricept, [facility] REHABILITATION Problems  Never Reviewed            Codes Class Noted POA    Alzheimer's dementia [facility]) ICD-10-CM: G30.9, F02.80  ICD-9-CM: 331.0, 294.10  12/16/2022 Unknown        * (Principal) Femur fracture ([facility] [address] ICD-10-CM: S72.90XA  ICD-9-CM: 821.00  12/15/2022 Unknown        Atrial fibrillation, chronic (HCC) ICD-10-CM: I48.20  ICD-9-CM: 427.31  12/15/2022 Unknown             Review of Systems:   A comprehensive review of systems was negative except for that written in the HPI. Vital Signs:    Last 24hrs VS reviewed since prior progress note. Most recent are:  Visit Vitals  BP (!) 102/49 (BP 1 Location: Left upper arm, BP Patient Position: At rest)   Pulse 89   Temp 98.3 °F (36.8 °C)   Resp 18   Ht 5' 7\" (1.702 m)   Wt 52.3 kg (115 lb 6.4 oz)   SpO2 (!) 87%   BMI 18.07 kg/m²       No intake or output data in the 24 hours ending 12/16/22 0915     Physical Examination:             General:          Alert, cooperative, no distress, appears stated age. sleeping but arousible  HEENT:           Atraumatic, anicteric sclerae, pink conjunctivae                          No oral ulcers, mucosa moist, throat clear, dentition fair  Neck:               Supple, symmetrical  Lungs:             Clear to auscultation bilaterally. No Wheezing or Rhonchi. No rales. Chest wall:      No tenderness  No Accessory muscle use. Heart:              irreg irreg tachycardia  Abdomen:        Soft, non-tender. Not distended. Bowel sounds normal  Extremities:     right hip tenderness  Skin:                Not pale. Not Jaundiced  No rashes   Psych:             Not anxious or agitated.   Neurologic:      Alert, moves all extremities, answers questions appropriately and responds to commands        Data Review:    Review and/or order of clinical lab test  Review and/or order of tests in the radiology section of CPT  Review and/or order of tests in the medicine section of CPT      Labs:     Recent Labs     12/15/22  1846   WBC 4. 8   HGB 10.8*   HCT 30.4*        Recent Labs     12/16/22  0447 12/15/22  1846    142   K 4.8 4.0    105   CO2 28 30   BUN 14 13   CREA 0.81 0.82   * 108*   CA 9.1 9.3   MG 2.3  --      Recent Labs     12/16/22 0447 12/15/22  1846   * 18   AP 95 78   TBILI 2.6* 1.4*   TP 6.9 7.2   ALB 3.4 3.6   GLOB 3.5 3.6     Recent Labs     12/16/22 0447 12/15/22  1846   INR 4.1* 3.9*   PTP 39.7* 38.6*      No results for input(s): FE, TIBC, PSAT, FERR in the last 72 hours. No results found for: FOL, RBCF   No results for input(s): PH, PCO2, PO2 in the last 72 hours. No results for input(s): CPK, CKNDX, TROIQ in the last 72 hours.     No lab exists for component: CPKMB  No results found for: CHOL, CHOLX, CHLST, CHOLV, HDL, HDLP, LDL, LDLC, DLDLP, TGLX, TRIGL, TRIGP, CHHD, CHHDX  No results found for: [facility]  Lab Results   Component Value Date/Time    Color Dark Yellow 12/16/2022 07:45 AM    Appearance Clear 12/16/2022 07:45 AM    Specific gravity 1.023 12/16/2022 07:45 AM    pH (UA) 5.5 12/16/2022 07:45 AM    Protein 30 (A) 12/16/2022 07:45 AM    Glucose Negative 12/16/2022 07:45 AM    Ketone Trace (A) 12/16/2022 07:45 AM    Bilirubin Small (A) 12/16/2022 07:45 AM    Urobilinogen 1.0 12/16/2022 07:45 AM    Nitrites Negative 12/16/2022 07:45 AM    Leukocyte Esterase Negative 12/16/2022 07:45 AM    Epithelial cells PENDING 12/16/2022 07:45 AM    Bacteria PENDING 12/16/2022 07:45 AM    WBC PENDING 12/16/2022 07:45 AM    RBC PENDING 12/16/2022 07:45 AM         Medications Reviewed:     Current Facility-Administered Medications   Medication Dose Route Frequency    0.9% sodium chloride infusion  50 mL/hr IntraVENous CONTINUOUS    morphine injection 4 mg  4 mg IntraVENous Q4H PRN    oxyCODONE-acetaminophen (PERCOCET) 5-325 mg per tablet 1 Tablet  1 Tablet Oral Q4H PRN    sodium chloride 0.9 % bolus infusion 500 mL  500 mL IntraVENous ONCE    dilTIAZem ER (CARDIZEM CD) capsule 120 mg  120 mg Oral DAILY    phytonadione (VITAMIN K) 1 mg/mL oral solution 5 mg  5 mg Oral NOW    folic acid (FOLVITE) tablet 1 mg  1 mg Oral DAILY    ferrous sulfate tablet 325 mg  325 mg Oral ACB    donepeziL (ARICEPT) tablet 5 mg  5 mg Oral QHS    memantine (NAMENDA) tablet 10 mg  10 mg Oral BID    sodium chloride (NS) flush 5-40 mL  5-40 mL IntraVENous Q8H    sodium chloride (NS) flush 5-40 mL  5-40 mL IntraVENous PRN    acetaminophen (TYLENOL) tablet 650 mg  650 mg Oral Q6H PRN    Or    acetaminophen (TYLENOL) suppository 650 mg  650 mg Rectal Q6H PRN    polyethylene glycol (MIRALAX) packet 17 g  17 g Oral DAILY PRN    ondansetron (ZOFRAN ODT) tablet 4 mg  4 mg Oral Q8H PRN    Or    ondansetron (ZOFRAN) injection 4 mg  4 mg IntraVENous Q6H PRN     ______________________________________________________________________  EXPECTED LENGTH OF STAY: - - -  ACTUAL LENGTH OF STAY:          0                 Marvin Jensen MD

## 2022-12-16 NOTE — PROGRESS NOTES
Comprehensive Nutrition Assessment    Type and Reason for Visit: Initial, Positive nutrition screen    Nutrition Recommendations/Plan:   Regular diet with  feeding assistance  and cueing to eat  Multivitamin with minerals. Ensure Enlive Daily at breakfast- given between meals - encourage food first  Offer bite of something sweet between bites of protein foods (pt prefers sweets)     Malnutrition Assessment:  Malnutrition Status:  Severe malnutrition (12/16/22 1442)    Context:  Acute illness     Findings of the 6 clinical characteristics of malnutrition:   Energy Intake:  75% or less of est energy req for 7 or more days  Weight Loss:  Unable to assess     Body Fat Loss: Moderate body fat loss, Buccal region, Fat overlying ribs, Orbital, Triceps   Muscle Mass Loss: Moderate muscle mass loss, Calf, Clavicles (pectoralis & deltoids), Hand (interosseous), Scapula (trapezius), Temples (temporalis), Thigh (quadriceps)  Fluid Accumulation:  No significant fluid accumulation,     Strength:  Not performed     Nutrition Assessment:    Pt admitted with femur fx, atrial fibrillation, anemia, with history of dementia. Pt states \"eating is the least thing I care about\". Pt states she consumes 3 meals a day with cookies or cake for breakfast, snacks for lunch and \"something I get\" for supper. Pt's BMI is 18 with goal 23-29 for age. Nutrition Related Findings:    No BM reported and pt unable to verbalize last BM. Wound Type: None    Current Nutrition Intake & Therapies:  Average Meal Intake: 1-25%  Average Supplement Intake: None ordered  ADULT DIET Regular    Anthropometric Measures:  Height: 5' 7\" (170.2 cm)  Ideal Body Weight (IBW): 135 lbs (61 kg)     Current Body Wt:  52.3 kg (115 lb 4.8 oz), 85.4 % IBW.  Bed scale  Current BMI (kg/m2): 18.1      BMI Category: Underweight (BMI less than 22) age over 72    Estimated Daily Nutrient Needs:  Energy Requirements Based On: Kcal/kg     Energy (kcal/day): 9379-2904 Kcal/d  Weight Used for Protein Requirements: Current  Protein (g/day): 52-68 gm/d (1.0-1.3 gm/kg)  Method Used for Fluid Requirements: 1 ml/kcal  Fluid (ml/day): 6237-7836 ml/d    Nutrition Diagnosis:   Severe malnutrition, In context of acute illness or injury related to acute injury/trauma, cardiac dysfunction, inadequate protein-energy intake as evidenced by Criteria as identified in malnutrition assessment, severe muscle loss, BMI, intake 0-25%, severe loss of subcutaneous fat    Nutrition Interventions:   Food and/or Nutrient Delivery: Continue current diet, Start oral nutrition supplement, IV fluid delivery (Ensure Enlive at breakfast)  Nutrition Education/Counseling: Education not appropriate (due to dementia)     Goals:     Goals: Meet at least 75% of estimated needs, by next RD assessment     Nutrition Monitoring and Evaluation:   Behavioral-Environmental Outcomes: None identified  Food/Nutrient Intake Outcomes: Food and nutrient intake, Supplement intake  Physical Signs/Symptoms Outcomes: Nutrition focused physical findings    Discharge Planning:     Too soon to determine    [place]  Contact: [phone]

## 2022-12-16 NOTE — ACP (ADVANCE CARE PLANNING)
Advance Care Planning     General Advance Care Planning (ACP) Conversation      Date of Conversation: 12/15/2022  Conducted with: Patient with Decision Making Capacity and Healthcare Decision Maker: Next of Kin by law (only applies in absence of a Healthcare Power of  or Legal Guardian)    Healthcare Decision Maker:   No healthcare decision makers have been documented.    Click here to complete [address] including selection of the Healthcare Decision Maker Relationship (ie \"Primary\")      Content/Action Overview:   Has NO ACP documents/care preferences - information provided, considering goals and options  Reviewed DNR/DNI and patient elects Full Code (Attempt Resuscitation)         Length of Voluntary ACP Conversation in minutes:  <16 minutes (Non-Billable)    Ric Cyr

## 2022-12-16 NOTE — PROGRESS NOTES
0700- bedside shift report completed and care of the patient assumed    0730- additional dose of morphine administered per dr Patrick Watson hogan catheter placed, urine samples obtained per policy, patient repositioned, attempt made to place patient in bucks traction, unable to straighten leg enough for traction to be effective, patient not tolerating much movement. 0745- HR 150s, o2 85% on room air, patient placed on 2LNC, HR and sats improved    0809- HR 140s and 150s dr. Natalio Ferguson notified, 500 ml bolus ordered and admnistered    0846-scheduled medication administered    1139- prn morphine administered, family in room, POC discussed at this time. 1309- Patient resting quietly with even, unlabored respirations,  at bedside    1425- patient resting comfortably, even, unlabored respirations noted, RLE assessed, no further swelling, pulse still palpable in foot.     1630- dinner in room, patient still resting with eyes closed, respirations noted

## 2022-12-16 NOTE — ASSESSMENT & PLAN NOTE
Patient has comminuted angulated distal right femoral fracture  She is excepted at [facility] to Dr. Bernardo Rios orthopedics with Dr. Peyman Marie as excepting hospitalist  Orthopedics not available this week in this facility  Pain control  Hold Coumadin  Unknown when patient will have surgery we will keep patient with regular diet  [place] traction

## 2022-12-16 NOTE — H&P
History and Physical    Subjective:     Zainab Amezcua is a 80 y.o. female  has a past medical history of Blood disorder and Dementia ([facility] [address] Patient seen at bedside in emergency department. Patient's son is with patient and reports that patient just slid down the wall and moved in a way that her foot twisted and angulated her leg. Patient did not trip and fall it was suggested that she lost her footing. Patient's son is in room reports all of the history patient has significant Alzheimer's dementia with confusion. Patient is pleasant but at baseline per son. Patient stays at home with her . Patient is excepted at [facility] with the accepting orthopedics Dr. Elizabeth Sagastume and hospitalist Dr. Sonia Chahal. There are no beds available at [facility] at this time patient's Coumadin will be held at this time she is on it for chronic atrial fibrillation, she will not be held n.p.o. at this time I will order regular diet. Past Medical History:   Diagnosis Date    Blood disorder     Dementia ([facility] [address]       No past surgical history on file. No family history on file. Social History     Tobacco Use    Smoking status: Not on file    Smokeless tobacco: Not on file   Substance Use Topics    Alcohol use: Not on file       Prior to Admission medications    Medication Sig Start Date End Date Taking? Authorizing Provider   warfarin (COUMADIN) 10 mg tablet Take 10 mg by mouth every other day. Yes Other, MD Oliver   warfarin (COUMADIN) 7.5 mg tablet Take 7.5 mg by mouth every other day. Yes Other, MD Oliver   memantine (NAMENDA) 10 mg tablet Take  by mouth two (2) times a day. Yes Lula, MD Oliver   donepeziL (ARICEPT) 5 mg tablet Take  by mouth nightly. Yes Other, MD Oliver   dilTIAZem ER (TIAZAC) 120 mg capsule Take 120 mg by mouth daily. Yes Lula, MD Oliver   folic acid 591 mcg tablet Take 400 mcg by mouth daily.    Yes Lula, MD Oliver   ferrous gluconate 324 mg (38 mg iron) tablet Take  by mouth Daily (before breakfast). Yes Other, MD Oliver     Allergies   Allergen Reactions    Penicillins Anaphylaxis         Review of Systems   Unable to perform ROS: Dementia        Objective:   VITALS:    Visit Vitals  BP (!) 115/55 (BP 1 Location: Left upper arm, BP Patient Position: At rest;Supine)   Pulse 91   Temp 97.3 °F (36.3 °C)   Resp 18   Ht 5' 7\" (1.702 m)   Wt 52.3 kg (115 lb 6.4 oz)   SpO2 97%   BMI 18.07 kg/m²       Physical Exam  Vitals and nursing note reviewed. Constitutional:       General: She is not in acute distress. Appearance: She is well-developed. She is ill-appearing. HENT:      Head: Normocephalic and atraumatic. Eyes:      Conjunctiva/sclera: Conjunctivae normal.      Pupils: Pupils are equal, round, and reactive to light. Cardiovascular:      Rate and Rhythm: Normal rate. Rhythm irregular. Pulses: Normal pulses. Heart sounds: Normal heart sounds. Pulmonary:      Effort: Pulmonary effort is normal. No respiratory distress. Breath sounds: Normal breath sounds. No stridor. Abdominal:      General: Bowel sounds are normal. There is no distension. Palpations: Abdomen is soft. There is no mass. Musculoskeletal:         General: Swelling, tenderness and deformity present. Normal range of motion. Cervical back: Normal range of motion and neck supple. Right lower leg: No edema. Left lower leg: No edema. Comments: Right distal femur with deformity swelling tenderness mild bruising normal distal PMS   Skin:     General: Skin is warm. Capillary Refill: Capillary refill takes less than 2 seconds. Neurological:      General: No focal deficit present. Mental Status: She is alert. Mental status is at baseline.    Psychiatric:         Mood and Affect: Mood normal.       _______________________________________________________________________  Care Plan discussed with:    Comments   Patient X    Family      RN X    Care Manager Consultant:      _______________________________________________________________________  Expected  Disposition:   Home with Family X   HH/PT/OT/RN    SNF/LTC    POLY    ________________________________________________________________________  TOTAL TIME:  48 Minutes    Critical Care Provided     Minutes non procedure based      Comments    X Reviewed previous records   >50% of visit spent in counseling and coordination of care X Discussion with patient and/or family and questions answered       ________________________________________________________________________    Labs:  Recent Results (from the past 24 hour(s))   CBC WITH AUTOMATED DIFF    Collection Time: 12/15/22  6:46 PM   Result Value Ref Range    WBC 4.8 4.6 - 13.2 K/uL    RBC 2.97 (L) 4.20 - 5.30 M/uL    HGB 10.8 (L) 12.0 - 16.0 g/dL    HCT 30.4 (L) 35.0 - 45.0 %    .4 (H) 78.0 - 100.0 FL    MCH 36.4 (H) 24.0 - 34.0 PG    MCHC 35.5 31.0 - 37.0 g/dL    RDW 19.1 (H) 11.6 - 14.5 %    PLATELET 211 665 - 846 K/uL    MPV 10.6 9.2 - 11.8 FL    NRBC 0.0 0.0  WBC    ABSOLUTE NRBC 0.00 0.00 - 0.01 K/uL    NEUTROPHILS 64 40 - 73 %    LYMPHOCYTES 27 21 - 52 %    MONOCYTES 6 3 - 10 %    EOSINOPHILS 1 0 - 5 %    BASOPHILS 1 0 - 2 %    IMMATURE GRANULOCYTES 1 (H) 0 - 0.5 %    ABS. NEUTROPHILS 3.0 1.8 - 8.0 K/UL    ABS. LYMPHOCYTES 1.3 0.9 - 3.6 K/UL    ABS. MONOCYTES 0.3 0.05 - 1.2 K/UL    ABS. EOSINOPHILS 0.1 0.0 - 0.4 K/UL    ABS. BASOPHILS 0.0 0.0 - 0.1 K/UL    ABS. IMM.  GRANS. 0.0 0.00 - 0.04 K/UL    DF AUTOMATED     TYPE & SCREEN    Collection Time: 12/15/22  6:46 PM   Result Value Ref Range    Crossmatch Expiration 12/18/2022,2359     ABO/Rh(D) AB Negative     Antibody screen Negative    PROTHROMBIN TIME + INR    Collection Time: 12/15/22  6:46 PM   Result Value Ref Range    Prothrombin time 38.6 (H) 11.5 - 15.2 sec    INR 3.9 (H) 0.8 - 1.2     METABOLIC PANEL, COMPREHENSIVE    Collection Time: 12/15/22  6:46 PM   Result Value Ref Range    Sodium 142 136 - 145 mmol/L    Potassium 4.0 3.5 - 5.5 mmol/L    Chloride 105 100 - 111 mmol/L    CO2 30 21 - 32 mmol/L    Anion gap 7 3.0 - 18.0 mmol/L    Glucose 108 (H) 74 - 99 mg/dL    BUN 13 7 - 18 mg/dL    Creatinine 0.82 0.60 - 1.30 mg/dL    BUN/Creatinine ratio 16 12 - 20      eGFR >60 >60 ml/min/1.73m2    Calcium 9.3 8.5 - 10.1 mg/dL    Bilirubin, total 1.4 (H) 0.2 - 1.0 mg/dL    AST (SGOT) 15 10 - 38 U/L    ALT (SGPT) 18 13 - 56 U/L    Alk. phosphatase 78 45 - 117 U/L    Protein, total 7.2 6.4 - 8.2 g/dL    Albumin 3.6 3.4 - 5.0 g/dL    Globulin 3.6 2.0 - 4.0 g/dL    A-G Ratio 1.0 0.8 - 1.7     EKG, 12 LEAD, INITIAL    Collection Time: 12/15/22  7:17 PM   Result Value Ref Range    Ventricular Rate 68 BPM    Atrial Rate 125 BPM    P-R Interval 57 ms    QRS Duration 82 ms    Q-T Interval 408 ms    QTC Calculation (Bezet) 434 ms    Calculated R Axis 80 degrees    Calculated T Axis 68 degrees    Diagnosis       Atrial fibrillation  Nonspecific T abnrm, anterolateral leads     COVID-19 RAPID TEST    Collection Time: 12/15/22  9:40 PM   Result Value Ref Range    COVID-19 rapid test Not Detected Not Detected     METABOLIC PANEL, COMPREHENSIVE    Collection Time: 12/16/22  4:47 AM   Result Value Ref Range    Sodium 138 136 - 145 mmol/L    Potassium 4.8 3.5 - 5.5 mmol/L    Chloride 101 100 - 111 mmol/L    CO2 28 21 - 32 mmol/L    Anion gap 9 3.0 - 18.0 mmol/L    Glucose 110 (H) 74 - 99 mg/dL    BUN 14 7 - 18 mg/dL    Creatinine 0.81 0.60 - 1.30 mg/dL    BUN/Creatinine ratio 17 12 - 20      eGFR >60 >60 ml/min/1.73m2    Calcium 9.1 8.5 - 10.1 mg/dL    Bilirubin, total 2.6 (H) 0.2 - 1.0 mg/dL    AST (SGOT) 134 (H) 10 - 38 U/L    ALT (SGPT) 106 (H) 13 - 56 U/L    Alk.  phosphatase 95 45 - 117 U/L    Protein, total 6.9 6.4 - 8.2 g/dL    Albumin 3.4 3.4 - 5.0 g/dL    Globulin 3.5 2.0 - 4.0 g/dL    A-G Ratio 1.0 0.8 - 1.7     MAGNESIUM    Collection Time: 12/16/22  4:47 AM   Result Value Ref Range    Magnesium 2.3 1.6 - 2.6 mg/dL   PROTHROMBIN TIME + INR    Collection Time: 12/16/22  4:47 AM   Result Value Ref Range    Prothrombin time 39.7 (H) 11.5 - 15.2 sec    INR 4.1 (H) 0.8 - 1.2         Imaging:  XR FEMUR RT 2 VS    Result Date: 12/15/2022  Right femur History:  Pain, fall Comparison:  No prior study Findings: 3 views of the femur were performed. 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 Comminuted distal femoral diaphyseal fracture is present. The main distal fracture fragment is angulated laterally. A butterfly fracture fragment is associated. Fracture line does not quite reach the distal stem of the femoral prosthesis. Prominent vascular calcifications are present. Partially visualized degenerative changes are present at the right knee and in the lower lumbar spine. Comminuted and angulated distal right femoral diaphyseal fracture. Evidence of prior right total hip arthroplasty. XR CHEST PORT    Result Date: 12/15/2022  Portable Chest  History: Congestion, shortness of breath Comparison: PA and lateral chest 3/18/2020 Portable view of the chest demonstrates mildly enlarged cardiomediastinal silhouette including mildly tortuous atherosclerotic thoracic aorta. Slightly patchy pulmonary opacity is present in the right upper lung zone, new since prior study. No pleural effusion or pneumothorax is present. Diffuse osteopenia is present. Healed deformity of the proximal right humerus is present. Degenerative changes are in the spine. Patchy right upper lung zone opacity which may represent atelectasis or pneumonia.        Assessment & Plan:       Femur fracture [facility])  Patient has comminuted angulated distal right femoral fracture  She is excepted at [place] [address] to  [facility] orthopedics with Dr. Maria Ventura as excepting hospitalist  Orthopedics not available this week in this facility  Pain control  Hold Coumadin  Unknown when patient will have surgery we will keep patient with regular diet  [place] traction    Atrial fibrillation, chronic ([facility] [address]  This is a chronic problem  Continue diltiazem, hold Coumadin presurgical  Telemetry    Alzheimer's dementia ([facility] [address]  This is a chronic problem  Continue Aricept, Namenda        Code Status: Full      Prophylaxis: Patient on Coumadin being held presurgical      Electronically Signed : Becky Diamond ENP-C, FNP-C, [address] voice recognition was used to generate this report, which may have resulted in some phonetic based errors in grammar and contents.  Even though attempts were made to correct all the mistakes, some may have been missed, and remained in the body of the document

## 2022-12-16 NOTE — ED NOTES
Pt c/o pain medication wore off, pain returned, contacted [facility] NAGY JR[facility], verbal order for 4 mg morphine IV.

## 2022-12-16 NOTE — PROGRESS NOTES
0830: Rounds completed with Dr. Jakob Hardy. Patient found to be in AFIB RVR with -.150's. Home PO Cardizem ordered. One time dose of 10 mg IV Cardizem ordered and administered. 2588: [facility] called no beds available unknown wait time. Called transfer center to see if other hospitals have availability, waiting on callback from [facility], [address]: No bed availability at [facility] or [facility]. Patient continues to [facility] for bed [facility]

## 2022-12-16 NOTE — PROGRESS NOTES
Care Management Interventions  PCP Verified by CM: Yes  Transition of Care Consult (CM Consult): Discharge Planning  Physical Therapy Consult: No  Occupational Therapy Consult: No  Speech Therapy Consult: No  Support Systems: Spouse/Significant Other, Child(luisa)  Confirm Follow Up Transport: Family  The Plan for Transition of Care is Related to the Following Treatment Goals : Patient centered discharge planning to ensure smooth transition to community and OF. Discharge Location  Patient Expects to be Discharged to[de-identified] Transferred to higher level of care      Pt placed in OBS after presenting to ED with complaints of pt twisting foot and angulated leg and found to have femur fracture. Pt accpeted at [facility] and awaiting bed. Pt did not use prior DME or have HH. CM following for DC needs. CM following for admission.

## 2022-12-16 NOTE — ED NOTES
TRANSFER - OUT REPORT:    Verbal report given to Chasidy Lui on Javier Arnett  being transferred to (unit) for routine progression of care       Report consisted of patients Situation, Background, Assessment and   Recommendations(SBAR). Information from the following report(s) SBAR was reviewed with the receiving nurse. Lines:   Peripheral IV 12/15/22 Anterior;Proximal;Right Forearm (Active)        Opportunity for questions and clarification was provided. Patient transported with:   Monitor  Registered Nurse    Saturation 90% on arrival to unit.  Placed on 2L NC

## 2022-12-16 NOTE — ED PROVIDER NOTES
Pt w h/o dementia, at baseline per family but limiting hpi. They state she was standing, went to turn, injured rt leg, severe rt thigh pain. H/o prior rt thr. No other injury inc no headache, no loc. No back or neck pain. Couldn't stand after the fall. Severe pain. Pt lives home w family and usually ambulates well. Is on coumadin for h/o blood disorder per family. Dr Rogelio Blandon did rt thr 10 yrs ago in [facility]. Past Medical History:   Diagnosis Date    Blood disorder     Dementia ([facility] [address]        No past surgical history on file. No family history on file. Social History     Socioeconomic History    Marital status:      Spouse name: Not on file    Number of children: Not on file    Years of education: Not on file    Highest education level: Not on file   Occupational History    Not on file   Tobacco Use    Smoking status: Not on file    Smokeless tobacco: Not on file   Substance and Sexual Activity    Alcohol use: Not on file    Drug use: Not on file    Sexual activity: Not on file   Other Topics Concern    Not on file   Social History Narrative    Not on file     Social Determinants of Health     Financial Resource Strain: Not on file   Food Insecurity: Not on file   Transportation Needs: Not on file   Physical Activity: Not on file   Stress: Not on file   Social Connections: Not on file   Intimate Partner Violence: Not on file   Housing Stability: Not on file         ALLERGIES: Penicillins    Review of Systems   Unable to perform ROS: Dementia     Vitals:    12/15/22 1835 12/1935 12/15/22 2014   BP: 115/72 (!) 119/58 (!) 117/42   Pulse: 82 66 83   Resp: 21 22 20   Temp: 97.5 °F (36.4 °C)     SpO2: 99% 99% 97%   Weight: 56.7 kg (125 lb)     Height: 5' 7\" (1.702 m)              Physical Exam  Vitals and nursing note reviewed. Constitutional:       Appearance: She is well-developed. She is not diaphoretic. HENT:      Head: Normocephalic and atraumatic.       Mouth/Throat:      Mouth: Mucous membranes are moist.   Eyes:      Conjunctiva/sclera: Conjunctivae normal.   Cardiovascular:      Rate and Rhythm: Normal rate and regular rhythm. Heart sounds: No murmur heard. Pulmonary:      Effort: Pulmonary effort is normal.      Breath sounds: No wheezing. Abdominal:      Palpations: Abdomen is soft. Tenderness: There is no abdominal tenderness. Musculoskeletal:         General: Tenderness present. Cervical back: Normal range of motion. No tenderness. Comments: + rt diffuse thigh ttp/swelling, rt leg externally rotated. 2+ rt dp. Sensation intact. Can't move rt hip/knee due to pain   Skin:     General: Skin is dry. Capillary Refill: Capillary refill takes less than 2 seconds. Findings: No rash. Neurological:      Mental Status: She is alert. Sensory: No sensory deficit. Motor: No weakness.       Comments: A and o x 1   Psychiatric:      Comments: Unable to assess        MDM         Procedures      Vitals:  Patient Vitals for the past 12 hrs:   Temp Pulse Resp BP SpO2   12/15/22 2014 -- 83 20 (!) 117/42 97 %   12/1935 -- 66 22 (!) 119/58 99 %   12/15/22 1835 97.5 °F (36.4 °C) 82 21 115/72 99 %         Medications ordered:   Medications   morphine injection 4 mg (4 mg IntraVENous Given 12/15/22 1910)   ondansetron (ZOFRAN) injection 4 mg (4 mg IntraVENous Given 12/15/22 1911)         Lab findings:  Recent Results (from the past 12 hour(s))   CBC WITH AUTOMATED DIFF    Collection Time: 12/15/22  6:46 PM   Result Value Ref Range    WBC 4.8 4.6 - 13.2 K/uL    RBC 2.97 (L) 4.20 - 5.30 M/uL    HGB 10.8 (L) 12.0 - 16.0 g/dL    HCT 30.4 (L) 35.0 - 45.0 %    .4 (H) 78.0 - 100.0 FL    MCH 36.4 (H) 24.0 - 34.0 PG    MCHC 35.5 31.0 - 37.0 g/dL    RDW 19.1 (H) 11.6 - 14.5 %    PLATELET 095 412 - 838 K/uL    MPV 10.6 9.2 - 11.8 FL    NRBC 0.0 0.0  WBC    ABSOLUTE NRBC 0.00 0.00 - 0.01 K/uL    NEUTROPHILS 64 40 - 73 %    LYMPHOCYTES 27 21 - 52 %    MONOCYTES 6 3 - 10 %    EOSINOPHILS 1 0 - 5 %    BASOPHILS 1 0 - 2 %    IMMATURE GRANULOCYTES 1 (H) 0 - 0.5 %    ABS. NEUTROPHILS 3.0 1.8 - 8.0 K/UL    ABS. LYMPHOCYTES 1.3 0.9 - 3.6 K/UL    ABS. MONOCYTES 0.3 0.05 - 1.2 K/UL    ABS. EOSINOPHILS 0.1 0.0 - 0.4 K/UL    ABS. BASOPHILS 0.0 0.0 - 0.1 K/UL    ABS. IMM. GRANS. 0.0 0.00 - 0.04 K/UL    DF AUTOMATED     TYPE & SCREEN    Collection Time: 12/15/22  6:46 PM   Result Value Ref Range    Crossmatch Expiration 12/18/2022,2359     ABO/Rh(D) AB Negative     Antibody screen Negative    PROTHROMBIN TIME + INR    Collection Time: 12/15/22  6:46 PM   Result Value Ref Range    Prothrombin time 38.6 (H) 11.5 - 15.2 sec    INR 3.9 (H) 0.8 - 1.2     METABOLIC PANEL, COMPREHENSIVE    Collection Time: 12/15/22  6:46 PM   Result Value Ref Range    Sodium 142 136 - 145 mmol/L    Potassium 4.0 3.5 - 5.5 mmol/L    Chloride 105 100 - 111 mmol/L    CO2 30 21 - 32 mmol/L    Anion gap 7 3.0 - 18.0 mmol/L    Glucose 108 (H) 74 - 99 mg/dL    BUN 13 7 - 18 mg/dL    Creatinine 0.82 0.60 - 1.30 mg/dL    BUN/Creatinine ratio 16 12 - 20      eGFR >60 >60 ml/min/1.73m2    Calcium 9.3 8.5 - 10.1 mg/dL    Bilirubin, total 1.4 (H) 0.2 - 1.0 mg/dL    AST (SGOT) 15 10 - 38 U/L    ALT (SGPT) 18 13 - 56 U/L    Alk. phosphatase 78 45 - 117 U/L    Protein, total 7.2 6.4 - 8.2 g/dL    Albumin 3.6 3.4 - 5.0 g/dL    Globulin 3.6 2.0 - 4.0 g/dL    A-G Ratio 1.0 0.8 - 1.7             X-Ray, CT or other radiology findings or impressions:  XR CHEST PORT   Final Result      Patchy right upper lung zone opacity which may represent atelectasis or   pneumonia. XR FEMUR RT 2 VS   Final Result      Comminuted and angulated distal right femoral diaphyseal fracture. Evidence of   prior right total hip arthroplasty. Progress notes, Consult notes or additional Procedure notes:   9:06 PM [facility] 9:31 PM d/w dr Brittny Miller, hospitalist at [facility], to accept.  [place] transfer center states no beds now, rec board here and will call when bed avail   9:34 PM d/w np Mirella Stroud, to admit      Diagnosis:   1. Closed fracture of right femur, unspecified fracture morphology, unspecified portion of femur, initial encounter ([facility] [address]    2. Fall, initial encounter        Disposition: tx    Follow-up Information    None          Patient's Medications   Start Taking    No medications on file   Continue Taking    DILTIAZEM ER (TIAZAC) 120 MG CAPSULE    Take 120 mg by mouth daily. DONEPEZIL (ARICEPT) 5 MG TABLET    Take  by mouth nightly. FERROUS GLUCONATE 324 MG (38 MG IRON) TABLET    Take  by mouth Daily (before breakfast). FOLIC ACID 194 MCG TABLET    Take 400 mcg by mouth daily. MEMANTINE (NAMENDA) 10 MG TABLET    Take  by mouth two (2) times a day. WARFARIN (COUMADIN) 10 MG TABLET    Take 10 mg by mouth every other day. WARFARIN (COUMADIN) 7.5 MG TABLET    Take 7.5 mg by mouth every other day.    These Medications have changed    No medications on file   Stop Taking    No medications on file

## 2022-12-16 NOTE — ED NOTES
External catheter applied. Pt had requested condom cath, explained process of external catheter to suction, pt agreed. Placed to Brenden Isaac.

## 2022-12-17 PROBLEM — S72.90XA FEMORAL FRACTURE (HCC): Status: ACTIVE | Noted: 2022-01-01

## 2022-12-17 NOTE — PROGRESS NOTES
Pt given instruction on IS therapy. However, the pt was unsuccessful, will continue to follow progress and reassess. RN notified.

## 2022-12-17 NOTE — PROGRESS NOTES
1850 - Shift change report received. Care of patient assumed. 1902 - Vital signs assessed. Patient repositioned. 0253 - Patient pulled out RAC PIV access. RH PIV intact and patient. Fluids remain infusing. PRN morphine administered for 10/10 leg pain. Patient refused dinner tray, but swallowed a few bites of pudding and drank a few sips of tea. No further needs at this time. Door open and bed alarm engaged. 2155 - Scheduled medication administered crushed in ice cream. Patient ate 4 bites of vanilla ice cream.     0019 - Vital signs assessed. Provider aware of patient's heart rate and rhythm. Patient repositioned. 0319 - PRN pain medication administered. Patient repositioned. Preventative hydrocolloid applied to patient's left heel and left knee as padding between extremities. 36 - Provider notified of 103/39 BP. Continuous fluid rate change to 75 ml/hr. 250 ml bolus normal saline infusing. POC glucose assessed at 103.     0630 - Patient unable to swallow ferrous sulfate pill. Provider notified. 0730 - 350 ml dark, samaria urine emptied from hogan catheter.

## 2022-12-17 NOTE — PROGRESS NOTES
Call received from transfer center. Pt expected to receive a bed assignment at [facility] this evening. Family provided with update. Family members in agreement that they no longer want patient to go to [facility] and want to wait for bed assignment at [facility] despite bed availability may take multiple days.

## 2022-12-17 NOTE — PROGRESS NOTES
Received return phone call from transfer center, patient has been accepted at [facility] to hospitalist Dr. Althea Jacobson, they will call back when a bed becomes available.

## 2022-12-17 NOTE — PROGRESS NOTES
Problem: Pressure Injury - Risk of  Goal: *Prevention of pressure injury  Description: Document Harshad Scale and appropriate interventions in the flowsheet. Outcome: Progressing Towards Goal  Note: Pressure Injury Interventions:  Sensory Interventions: Assess changes in LOC    Moisture Interventions: Absorbent underpads    Activity Interventions: PT/OT evaluation    Mobility Interventions: Float heels    Nutrition Interventions: Document food/fluid/supplement intake    Friction and Shear Interventions: Minimize layers, HOB 30 degrees or less                Problem: Patient Education: Go to Patient Education Activity  Goal: Patient/Family Education  Outcome: Progressing Towards Goal     Problem: Falls - Risk of  Goal: *Absence of Falls  Description: Document Rene Fall Risk and appropriate interventions in the flowsheet.   Outcome: Progressing Towards Goal  Note: Fall Risk Interventions:       Mentation Interventions: Door open when patient unattended, Bed/chair exit alarm              History of Falls Interventions: Bed/chair exit alarm, Door open when patient unattended, Room close to nurse's station         Problem: Patient Education: Go to Patient Education Activity  Goal: Patient/Family Education  Outcome: Progressing Towards Goal     Problem: Nutrition Deficit  Goal: *Optimize nutritional status  Outcome: Progressing Towards Goal

## 2022-12-17 NOTE — PROGRESS NOTES
Hospitalist Progress Note     INTERNAL MEDICINE PROGRESS NOTE  Patient: Elizabeth Villatoro   YOB: 1931   MRN: [id number]      Hospital course / Assessment    Principle Problems:  Femur fracture [facility])  Patient has comminuted angulated distal right femoral fracture  She is excepted at [facility] to Dr. Gerardo Proctor orthopedics with Dr. Radha Manley as Accepting hospitalist  Orthopedics not available this week in this facility, ED recommended admission waiting for transfer - she is at high risk of   Pain control  Hold Coumadin, INR 4 , s/p dose of Vit K   Sharp traction  Waiting on transfer  Acute Anemia secondary to acute blood loss from trauma with elevated INR  Hgb 9.3 ->7.8, given her underlying uncontrolled Afib and expected surgery, will transfer 1 unit of blood   Cont' monitor   Atrial fibrillation, chronic (HCC)  Tele, coumadin on hold pre op  Required a bolus of IV Cardizem for Rapid Afib-RVR  Cont' with PO cardizem  Cardiology consulted and following   trend INR   Alzheimer's dementia ([facility] [address]  Continue Aricept, Namenda      Code Status: Full code   DVT prophylaxis: pharmacologic and mechanical  Today Recommendation and Plan:   High risk of complication, need surgical intervention  Waiting on transfer bed   Transfuse 1 unit PRBC and monitor   Cont' with pain control , attraction, monitor INR, monitor h/h, monitor vitals,   Cont' with Cardizem and tele monitor    Disposition    Disposition: waiting on transfer to [facility]    Subjective / ROS:   Patient states pain controlled, no new complain, no CP, not in distress       Medical Decision Making   Chart, Images and Lab data reviewed, necessary medical Orders placed   Discussed with nursing staff     Vitals:    22 2154 22 0000 22 0019 22 0336   BP:   (!) 113/50 (!) 103/39   Pulse:  (!) 106 (!) 118 (!) 125   Resp:   19 16   Temp:   98.8 °F (37.1 °C) 98.5 °F (36.9 °C)   SpO2: 96%  98% 96%   Weight:       Height:         Temp (24hrs), Av.6 °F (37 °C), Min:98.1 °F (36.7 °C), Max:98.8 °F (37.1 °C)      Intake/Output Summary (Last 24 hours) at 12/17/2022 0715  Last data filed at 12/16/2022 1831  Gross per 24 hour   Intake 120 ml   Output 200 ml   Net -80 ml       Physical Exam:   General Appearance:   Appears in no acute distress.,  HEENT:   Moist oral mucous membranes, conjunctiva clear,   Neck:   Supple  Lungs: No wheezes. , No rales. , Normal respiratory effort,   Heart:   IRRegular rate and rhythm  Abdomen:   Soft , Non-distended and Non-tender,   Extremities:   no edema of legs  Neuro:   alert, restricted RLE, tender     Current medications:     Current Facility-Administered Medications   Medication Dose Route Frequency    0.9% sodium chloride infusion  75 mL/hr IntraVENous CONTINUOUS    morphine injection 4 mg  4 mg IntraVENous Q4H PRN    oxyCODONE-acetaminophen (PERCOCET) 5-325 mg per tablet 1 Tablet  1 Tablet Oral Q4H PRN    dilTIAZem ER (CARDIZEM CD) capsule 120 mg  120 mg Oral DAILY    folic acid (FOLVITE) tablet 1 mg  1 mg Oral DAILY    ferrous sulfate tablet 325 mg  325 mg Oral ACB    donepeziL (ARICEPT) tablet 5 mg  5 mg Oral QHS    memantine (NAMENDA) tablet 10 mg  10 mg Oral BID    sodium chloride (NS) flush 5-40 mL  5-40 mL IntraVENous Q8H    sodium chloride (NS) flush 5-40 mL  5-40 mL IntraVENous PRN    acetaminophen (TYLENOL) tablet 650 mg  650 mg Oral Q6H PRN    Or    acetaminophen (TYLENOL) suppository 650 mg  650 mg Rectal Q6H PRN    polyethylene glycol (MIRALAX) packet 17 g  17 g Oral DAILY PRN    ondansetron (ZOFRAN ODT) tablet 4 mg  4 mg Oral Q8H PRN    Or    ondansetron (ZOFRAN) injection 4 mg  4 mg IntraVENous Q6H PRN          Laboratory and Radiology Data :      No results found for: FERR  WBC   Date Value Ref Range Status   12/17/2022 8.3 4.6 - 13.2 K/uL Final     No results found for: SANYA  No results found for: UEO    Microbiology    No results found for: GMS    Recent Results (from the past 24 hour(s))   URINALYSIS W/MICROSCOPIC    Collection Time: 12/16/22  7:45 AM   Result Value Ref Range    Color Dark Yellow      Appearance Clear      Specific gravity 1.023 1.005 - 1.030      pH (UA) 5.5 5.0 - 8.0      Protein 30 (A) Negative mg/dL    Glucose Negative Negative mg/dL    Ketone Trace (A) Negative mg/dL    Bilirubin Small (A) Negative      Blood Negative Negative      Urobilinogen 1.0 0.2 - 1.0 EU/dL    Nitrites Negative Negative      Leukocyte Esterase Negative Negative      WBC 0-4 0 - 4 /hpf    RBC 0-5 0 - 2 /hpf    Epithelial cells Few 0 - 20 /lpf    Bacteria 1+ (A) None /hpf    CA Oxalate crystals 1+    CBC WITH AUTOMATED DIFF    Collection Time: 12/16/22  7:50 AM   Result Value Ref Range    WBC 8.9 4.6 - 13.2 K/uL    RBC 2.76 (L) 4.20 - 5.30 M/uL    HGB 9.3 (L) 12.0 - 16.0 g/dL    HCT 27.6 (L) 35.0 - 45.0 %    .0 78.0 - 100.0 FL    MCH 33.7 24.0 - 34.0 PG    MCHC 33.7 31.0 - 37.0 g/dL    RDW 17.5 (H) 11.6 - 14.5 %    PLATELET 224 394 - 965 K/uL    MPV 11.1 9.2 - 11.8 FL    NRBC 0.0 0.0  WBC    ABSOLUTE NRBC 0.00 0.00 - 0.01 K/uL    NEUTROPHILS 75 (H) 40 - 73 %    LYMPHOCYTES 17 (L) 21 - 52 %    MONOCYTES 8 3 - 10 %    EOSINOPHILS 0 0 - 5 %    BASOPHILS 0 0 - 2 %    IMMATURE GRANULOCYTES 0 0 - 0.5 %    ABS. NEUTROPHILS 6.6 1.8 - 8.0 K/UL    ABS. LYMPHOCYTES 1.5 0.9 - 3.6 K/UL    ABS. MONOCYTES 0.7 0.05 - 1.2 K/UL    ABS. EOSINOPHILS 0.0 0.0 - 0.4 K/UL    ABS. BASOPHILS 0.0 0.0 - 0.1 K/UL    ABS. IMM.  GRANS. 0.0 0.00 - 0.04 K/UL    DF AUTOMATED     GLUCOSE, POC    Collection Time: 12/17/22  3:46 AM   Result Value Ref Range    Glucose (POC) 103 70 - 110 mg/dL    Performed by Karlos Myers    CBC WITH AUTOMATED DIFF    Collection Time: 12/17/22  4:20 AM   Result Value Ref Range    WBC 8.3 4.6 - 13.2 K/uL    RBC 2.11 (L) 4.20 - 5.30 M/uL    HGB 7.8 (L) 12.0 - 16.0 g/dL    HCT 22.4 (L) 35.0 - 45.0 %    .2 (H) 78.0 - 100.0 FL    MCH 37.0 (H) 24.0 - 34.0 PG    MCHC 34.8 31.0 - 37.0 g/dL    RDW 20.1 (H) 11.6 - 14.5 %    PLATELET 249 785 - 499 K/uL    MPV 11.3 9.2 - 11.8 FL    NRBC 0.0 0.0  WBC    ABSOLUTE NRBC 0.00 0.00 - 0.01 K/uL    NEUTROPHILS 82 (H) 40 - 73 %    LYMPHOCYTES 10 (L) 21 - 52 %    MONOCYTES 8 3 - 10 %    EOSINOPHILS 0 0 - 5 %    BASOPHILS 0 0 - 2 %    IMMATURE GRANULOCYTES 0 0 - 0.5 %    ABS. NEUTROPHILS 6.8 1.8 - 8.0 K/UL    ABS. LYMPHOCYTES 0.8 (L) 0.9 - 3.6 K/UL    ABS. MONOCYTES 0.7 0.05 - 1.2 K/UL    ABS. EOSINOPHILS 0.0 0.0 - 0.4 K/UL    ABS. BASOPHILS 0.0 0.0 - 0.1 K/UL    ABS. IMM. GRANS. 0.0 0.00 - 0.04 K/UL    DF Manual      PLATELET COMMENTS Large Platelets      RBC COMMENTS Microcytosis  1+        RBC COMMENTS Anisocytosis  2+        RBC COMMENTS Rouleaux  2+       MAGNESIUM    Collection Time: 12/17/22  4:20 AM   Result Value Ref Range    Magnesium 2.1 1.6 - 2.6 mg/dL   METABOLIC PANEL, BASIC    Collection Time: 12/17/22  4:20 AM   Result Value Ref Range    Sodium 142 136 - 145 mmol/L    Potassium 4.5 3.5 - 5.5 mmol/L    Chloride 107 100 - 111 mmol/L    CO2 29 21 - 32 mmol/L    Anion gap 6 3.0 - 18.0 mmol/L    Glucose 99 74 - 99 mg/dL    BUN 27 (H) 7 - 18 mg/dL    Creatinine 0.84 0.60 - 1.30 mg/dL    BUN/Creatinine ratio 32 (H) 12 - 20      eGFR >60 >60 ml/min/1.73m2    Calcium 8.4 (L) 8.5 - 10.1 mg/dL   PHOSPHORUS    Collection Time: 12/17/22  4:20 AM   Result Value Ref Range    Phosphorus 3.2 2.5 - 4.9 mg/dL   PROTHROMBIN TIME + INR    Collection Time: 12/17/22  4:20 AM   Result Value Ref Range    Prothrombin time 31.0 (H) 11.5 - 15.2 sec    INR 3.0 (H) 0.8 - 1.2         XR Results:  Results from Hospital Encounter encounter on 12/15/22    XR FEMUR RT 2 VS    Narrative  Right femur    History:  Pain, fall    Comparison:  No prior study    Findings:    3 views of the femur were performed. 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 Comminuted distal  femoral diaphyseal fracture is present.  The main distal fracture fragment is  angulated laterally. A butterfly fracture fragment is associated. Fracture line  does not quite reach the distal stem of the femoral prosthesis. Prominent  vascular calcifications are present. Partially visualized degenerative changes  are present at the right knee and in the lower lumbar spine. Impression  Comminuted and angulated distal right femoral diaphyseal fracture. Evidence of  prior right total hip arthroplasty. CT Results:  No results found for this or any previous visit. MRI Results:  No results found for this or any previous visit. Nuclear Medicine Results:  No results found for this or any previous visit. US Results:  No results found for this or any previous visit. IR Results:  No results found for this or any previous visit. VAS/US Results:  No results found for this or any previous visit. Candice Chavez M.D.   [facility]

## 2022-12-17 NOTE — PROGRESS NOTES
Spoke with the patient son and  who are both power of  of this patient and they have provided advance directive and they want the patient to be DNI, they want compression, medications, and defibrillation, but does not want her to be intubated. Code status updated in system. Advance medical directive in chart.

## 2022-12-17 NOTE — PROGRESS NOTES
Transfer center called and informed that per family request they would like to bed search to include [facility]. Pt now on waiting list for both [facility] and [facility]. Family to be updated.

## 2022-12-17 NOTE — PROGRESS NOTES
0700- assumed care of pt, door open, close to nurses station CBWR   0800-  at bedside with patient no voiced concerns at this time; repositioned patient. 1019- scheduled medications given per [facility], repositioned patient  1102- new bag of fluids hung, no voiced concerns at this time. 1300- rounded on pt, no voiced concerns at this time. 1628-pt c/o of pain in legs, notified provider of BP gave PRN pain medication; pt's blood pressures have been on the lower side.

## 2022-12-18 NOTE — PROGRESS NOTES
1845 - Assumed care of pt, bedside shift report given    1930 - VSS. Np aware of low BP, per NP continue to give prn pain medication as needed. Pt alert to self only. States she is \"not hurting anywhere\" at this time. Mederos draining samaria urine by gravity without issue. IVF infusing without issue. CBWR     2050 - Pt yelling out that her hip is hurting, repositioned for comfort. PRN morphine given. 2103 - Scheduled medication given with thickened water, pt tolerated well    2152 - Pt yellow out, repositioned for comfort. 2231 - Pt continues to scream out in pain, current BP 87/44. PRN percocet given. NP aware of current VS, no new orders. Repositioned for pressure reduction and comfort    2323 - Rounded on pt, appears to be asleep. RR even and unlabored. NAD noted. 0032 - Pt screaming out in pain. PRN morphine given. Repositioned for comfort. New bag IVF hung per orders. 0325 - Pt resting with eyes closed, appears to be asleep. 65 - Mederos/marialuisa care provided. Repositioned for pressure reduction and comfort. PRN morphine given for 9/10 pain.      4163 - Scheduled medication given, pt took med whole with encouragement

## 2022-12-18 NOTE — PROGRESS NOTES
0800-PT ASSESSMENT( PT JAUNDICE)    PT REPOSITIONED/ POORLY TOLERATED    POOR PO INTAKE CONSUMED FEW SPOONFULS OF BREAKFAST/ SLEPT THROUGH LUNCH, CONSUMED TWO SPOONFULS OF DINNER      ( ENCOURAGEMENT NEEDED TO SWALLOW MEALS/MEDS)   HOBBS CATH CARE      CONTINUES AFIB ON THE MONITOR    1428- HR sustain 150-160s;  bp 99/42; np made aware, order  or iv Cardizem 10 mg once.  Will monitor    BP RECHECK- VSS    SOFT RIGHT LEG IMMOBILIZER TO RLE INTACT     PT MEDICATED WITH MORPHINE  X 3 THIS SHIFT    ORAL CARE PERFORMED  PT REPOSITIONED  PT RESTING WITH EYES CLOSED

## 2022-12-18 NOTE — PROGRESS NOTES
Problem: Pressure Injury - Risk of  Goal: *Prevention of pressure injury  Description: Document Harshad Scale and appropriate interventions in the flowsheet. Outcome: Progressing Towards Goal  Note: Pressure Injury Interventions:  Sensory Interventions: Assess changes in LOC, Assess need for specialty bed, Avoid rigorous massage over bony prominences, Check visual cues for pain, Float heels, Keep linens dry and wrinkle-free, Minimize linen layers, Turn and reposition approx. every two hours (pillows and wedges if needed)    Moisture Interventions: Absorbent underpads, Apply protective barrier, creams and emollients, Check for incontinence Q2 hours and as needed, Internal/External urinary devices, Minimize layers, Moisture barrier    Activity Interventions: Assess need for specialty bed, PT/OT evaluation    Mobility Interventions: Assess need for specialty bed, Float heels, HOB 30 degrees or less, Turn and reposition approx. every two hours(pillow and wedges)    Nutrition Interventions: Document food/fluid/supplement intake, Discuss nutritional consult with provider, Offer support with meals,snacks and hydration    Friction and Shear Interventions: Apply protective barrier, creams and emollients, Foam dressings/transparent film/skin sealants, HOB 30 degrees or less, Minimize layers                Problem: Falls - Risk of  Goal: *Absence of Falls  Description: Document Rene Fall Risk and appropriate interventions in the flowsheet.   Outcome: Progressing Towards Goal  Note: Fall Risk Interventions:       Mentation Interventions: Door open when patient unattended, Bed/chair exit alarm              History of Falls Interventions: Bed/chair exit alarm, Door open when patient unattended, Room close to nurse's station         Problem: Nutrition Deficit  Goal: *Optimize nutritional status  Outcome: Progressing Towards Goal

## 2022-12-18 NOTE — PROGRESS NOTES
Problem: Falls - Risk of  Goal: *Absence of Falls  Description: Document Alex Olvera Fall Risk and appropriate interventions in the flowsheet.   Outcome: Progressing Towards Goal  Note: Fall Risk Interventions:       Mentation Interventions: Door open when patient unattended              History of Falls Interventions: Bed/chair exit alarm

## 2022-12-18 NOTE — PROGRESS NOTES
Hospitalist Progress Note     INTERNAL MEDICINE PROGRESS NOTE  Patient: Darylene Lawyer   YOB: 1931   MRN: [id number]      Hospital course / Assessment    Principle Problems:  Femur fracture [facility])  Patient has comminuted angulated distal right femoral fracture  She is accepted at [facility] to Dr. Radha Rahman orthopedics with Dr. David Santos as Accepting hospitalist  Orthopedics not available this week in this facility, ED recommended admission waiting for transfer - she is at high risk of complication as admission INR is 4.1  Pain control  Hold Coumadin, INR 4.1 , s/p dose of Vit K -> 2.3  Traction if tolerated   Waiting on transfer  Acute Anemia secondary to acute blood loss from trauma / hematoma with elevated INR  Hgb 9.3 ->7.8->7.1, given her underlying uncontrolled Afib and expected surgery, will transfuse 1 unit of blood if obtained from [address] monitor H/H and INR   Cont' with PO iron and folate   Atrial fibrillation, chronic (HCC)  Tele, coumadin on hold pre op  Required a bolus of IV Cardizem for Rapid Afib-RVR  Cont' with PO cardizem  Cardiology consulted and following   trend INR - 4.1 ->2. 3   Alzheimer's dementia ([facility] [address]  Continue Aricept, Namenda      Code Status: Partial - DNI but want compression, medications, and defibrillation, but does not want her to be intubated  DVT prophylaxis: pharmacologic and mechanical  Today Recommendation and Plan:   High risk of complication, need surgical intervention  Still Waiting on transfer bed   Transfuse 1 unit PRBC if matched or obtained from [facility] and monitor h/h closely   Cont' with pain control , traction, monitor INR, monitor h/h, monitor vitals,   Cont' with Cardizem and tele monitor  Code status changed to DNI  Start sq heparin once INR below 1.5  Tachy, started on IVF     Disposition    Disposition: waiting on transfer to [facility]    Subjective / ROS:   Patient is demented, she still in pain       Medical Decision Making   Chart, Images and Lab data reviewed, necessary medical Orders placed   Discussed with nursing staff     Vitals:    22 2322 22 2358 22 0529 22 0702   BP:  (!) 99/56 (!) 117/54 105/67   Pulse:  (!) 132 (!) 124 74   Resp:    Temp:  97.8 °F (36.6 °C) 97 °F (36.1 °C) 97.4 °F (36.3 °C)   SpO2:  92% 93% 95%   Weight:   61 kg (134 lb 6.4 oz)    Height:         Temp (24hrs), Av °F (36.7 °C), Min:97 °F (36.1 °C), Max:99.5 °F (37.5 °C)      Intake/Output Summary (Last 24 hours) at 2022 0717  Last data filed at 2022 4507  Gross per 24 hour   Intake 3200 ml   Output 1025 ml   Net 2175 ml         Physical Exam:   General Appearance:   Appears in no acute distress.,  HEENT:   Moist oral mucous membranes, conjunctiva clear,   Neck:   Supple  Lungs: No wheezes. , No rales. , Normal respiratory effort,   Heart:   IRRegular rate and rhythm  Abdomen:   Soft , Non-distended and Non-tender,   Extremities:   no edema of legs  Neuro:   alert, demented,  restricted RLE, tender     Current medications:     Current Facility-Administered Medications   Medication Dose Route Frequency    0.9% sodium chloride infusion 250 mL  250 mL IntraVENous PRN    0.9% sodium chloride infusion  75 mL/hr IntraVENous CONTINUOUS    morphine injection 4 mg  4 mg IntraVENous Q4H PRN    oxyCODONE-acetaminophen (PERCOCET) 5-325 mg per tablet 1 Tablet  1 Tablet Oral Q4H PRN    dilTIAZem ER (CARDIZEM CD) capsule 120 mg  120 mg Oral DAILY    folic acid (FOLVITE) tablet 1 mg  1 mg Oral DAILY    ferrous sulfate tablet 325 mg  325 mg Oral ACB    donepeziL (ARICEPT) tablet 5 mg  5 mg Oral QHS    memantine (NAMENDA) tablet 10 mg  10 mg Oral BID    sodium chloride (NS) flush 5-40 mL  5-40 mL IntraVENous Q8H    sodium chloride (NS) flush 5-40 mL  5-40 mL IntraVENous PRN    acetaminophen (TYLENOL) tablet 650 mg  650 mg Oral Q6H PRN    Or    acetaminophen (TYLENOL) suppository 650 mg  650 mg Rectal Q6H PRN    polyethylene glycol (MIRALAX) packet 17 g  17 g Oral DAILY PRN    ondansetron (ZOFRAN ODT) tablet 4 mg  4 mg Oral Q8H PRN    Or    ondansetron (ZOFRAN) injection 4 mg  4 mg IntraVENous Q6H PRN          Laboratory and Radiology Data :      No results found for: FERR  WBC   Date Value Ref Range Status   12/18/2022 8.7 4.6 - 13.2 K/uL Final     No results found for: SANYA  No results found for: UEO    Microbiology    No results found for: GMS    Recent Results (from the past 24 hour(s))   PROTHROMBIN TIME + INR    Collection Time: 12/17/22 11:30 AM   Result Value Ref Range    Prothrombin time 25.7 (H) 11.5 - 15.2 sec    INR 2.3 (H) 0.8 - 1.2     HGB & HCT    Collection Time: 12/17/22 11:30 AM   Result Value Ref Range    HGB 7.2 (L) 12.0 - 16.0 g/dL    HCT 21.2 (L) 35.0 - 45.0 %    MCH 35.3 (H) 24.0 - 34.0 PG    MCHC 34.4 31.0 - 37.0 g/dL   CBC WITH AUTOMATED DIFF    Collection Time: 12/18/22  5:12 AM   Result Value Ref Range    WBC 8.7 4.6 - 13.2 K/uL    RBC 1.65 (L) 4.20 - 5.30 M/uL    HGB 7.1 (L) 12.0 - 16.0 g/dL    HCT 18.1 (L) 35.0 - 45.0 %    .7 (H) 78.0 - 100.0 FL    MCH 43.0 (H) 24.0 - 34.0 PG    MCHC 39.2 (H) 31.0 - 37.0 g/dL    RDW 24.1 (H) 11.6 - 14.5 %    PLATELET 618 770 - 891 K/uL    MPV 11.0 9.2 - 11.8 FL    NRBC 0.0 0.0  WBC    ABSOLUTE NRBC 0.00 0.00 - 0.01 K/uL    NEUTROPHILS 77 (H) 40 - 73 %    LYMPHOCYTES 14 (L) 21 - 52 %    MONOCYTES 8 3 - 10 %    EOSINOPHILS 0 0 - 5 %    BASOPHILS 0 0 - 2 %    IMMATURE GRANULOCYTES 1 (H) 0 - 0.5 %    ABS. NEUTROPHILS 6.6 1.8 - 8.0 K/UL    ABS. LYMPHOCYTES 1.2 0.9 - 3.6 K/UL    ABS. MONOCYTES 0.7 0.05 - 1.2 K/UL    ABS. EOSINOPHILS 0.0 0.0 - 0.4 K/UL    ABS. BASOPHILS 0.0 0.0 - 0.1 K/UL    ABS. IMM.  GRANS. 0.1 (H) 0.00 - 0.04 K/UL    DF AUTOMATED     METABOLIC PANEL, BASIC    Collection Time: 12/18/22  5:12 AM   Result Value Ref Range    Sodium 140 136 - 145 mmol/L    Potassium 4.8 3.5 - 5.5 mmol/L    Chloride 107 100 - 111 mmol/L    CO2 29 21 - 32 mmol/L    Anion gap 4 3.0 - 18.0 mmol/L    Glucose 104 (H) 74 - 99 mg/dL    BUN 33 (H) 7 - 18 mg/dL    Creatinine 0.82 0.60 - 1.30 mg/dL    BUN/Creatinine ratio 40 (H) 12 - 20      eGFR >60 >60 ml/min/1.73m2    Calcium 8.6 8.5 - 10.1 mg/dL       XR Results:  Results from Hospital Encounter encounter on 12/15/22    XR FEMUR RT 2 VS    Narrative  Right femur    History:  Pain, fall    Comparison:  No prior study    Findings:    3 views of the femur were performed. 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 Comminuted distal  femoral diaphyseal fracture is present. The main distal fracture fragment is  angulated laterally. A butterfly fracture fragment is associated. Fracture line  does not quite reach the distal stem of the femoral prosthesis. Prominent  vascular calcifications are present. Partially visualized degenerative changes  are present at the right knee and in the lower lumbar spine. Impression  Comminuted and angulated distal right femoral diaphyseal fracture. Evidence of  prior right total hip arthroplasty. CT Results:  No results found for this or any previous visit. MRI Results:  No results found for this or any previous visit. Nuclear Medicine Results:  No results found for this or any previous visit. US Results:  No results found for this or any previous visit. IR Results:  No results found for this or any previous visit. VAS/US Results:  No results found for this or any previous visit. Bettyann Barthel M.D.   Hospitalist

## 2022-12-18 NOTE — PROGRESS NOTES
Checked on transfer status--No bed at the moment @ [facility]

## 2022-12-19 NOTE — PROGRESS NOTES
@0105 Received report from Elkin Matthews LPN.    @0426 Received care of pt via bed from [address] [place] [address]. -150's Afib with RVR. Pt opens eyes when name is called. Pt skin pallor jaundice. Pt yells out when being touch or repositioned. Pt right hip deformed. Mederos patent, draining dark yellow colored urine. @2245 Cardizem drip started @ 2.5 mg/hr () per NP orders. @2250 250 ml NS IV bolus given per NP orders. @0038 Pt resting quietly at present. -120's, Afib on cardiac monitor. CBWR, bed in lowest position. @0245 Pt hip turned inward. 5 lb Hernandez's traction applied to right hip keep pt hip in good alinement. Pt repositioned for comfort. Pt screaming out. @0255 Morphine 4 mg IV given. @0310 Pt restless at present. Pt continues to yell out. Sandeep Quintana NP notified. New orders were received. @0316 Benadryl 12.5 mg IV given. @0404 Pt resting quietly in bed with eyes closed. Resp even and non-labored. SATS 100% on 4 lpm via n/c. Hr 100-120's, Afib on cardiac monitor. Cardizem drip remain @ 7.5 mg/hr. @0151 Bedside shift report given to oncoming nurse.

## 2022-12-19 NOTE — PROGRESS NOTES
Problem: Pressure Injury - Risk of  Goal: *Prevention of pressure injury  Description: Document Harshad Scale and appropriate interventions in the flowsheet. Outcome: Progressing Towards Goal  Note: Pressure Injury Interventions:  Sensory Interventions: Assess changes in LOC, Avoid rigorous massage over bony prominences, Keep linens dry and wrinkle-free, Maintain/enhance activity level, Minimize linen layers, Check visual cues for pain    Moisture Interventions: Check for incontinence Q2 hours and as needed, Internal/External urinary devices, Minimize layers, Maintain skin hydration (lotion/cream)    Activity Interventions: Pressure redistribution bed/mattress(bed type)    Mobility Interventions: Pressure redistribution bed/mattress (bed type)    Nutrition Interventions: Document food/fluid/supplement intake, Discuss nutritional consult with provider, Offer support with meals,snacks and hydration    Friction and Shear Interventions: Apply protective barrier, creams and emollients, Minimize layers                Problem: Patient Education: Go to Patient Education Activity  Goal: Patient/Family Education  Outcome: Progressing Towards Goal     Problem: Falls - Risk of  Goal: *Absence of Falls  Description: Document Rene Fall Risk and appropriate interventions in the flowsheet.   Outcome: Progressing Towards Goal  Note: Fall Risk Interventions:       Mentation Interventions: Door open when patient unattended              History of Falls Interventions: Bed/chair exit alarm         Problem: Patient Education: Go to Patient Education Activity  Goal: Patient/Family Education  Outcome: Progressing Towards Goal     Problem: Nutrition Deficit  Goal: *Optimize nutritional status  Outcome: Progressing Towards Goal     Problem: Patient Education: Go to Patient Education Activity  Goal: Patient/Family Education  Outcome: Progressing Towards Goal     Problem: Afib Pathway: Day 1  Goal: Off Pathway (Use only if patient is Off Pathway)  Outcome: Progressing Towards Goal  Goal: Activity/Safety  Outcome: Progressing Towards Goal  Goal: Consults, if ordered  Outcome: Progressing Towards Goal  Goal: Diagnostic Test/Procedures  Outcome: Progressing Towards Goal  Goal: Nutrition/Diet  Outcome: Progressing Towards Goal  Goal: Discharge Planning  Outcome: Progressing Towards Goal  Goal: Medications  Outcome: Progressing Towards Goal  Goal: Respiratory  Outcome: Progressing Towards Goal  Goal: Treatments/Interventions/Procedures  Outcome: Progressing Towards Goal  Goal: Psychosocial  Outcome: Progressing Towards Goal  Goal: *Optimal pain control at patient's stated goal  Outcome: Progressing Towards Goal  Goal: *Hemodynamically stable  Outcome: Progressing Towards Goal  Goal: *Stable cardiac rhythm  Outcome: Progressing Towards Goal  Goal: *Lungs clear or at baseline  Outcome: Progressing Towards Goal  Goal: *Labs within defined limits  Outcome: Progressing Towards Goal  Goal: *Describes available resources and support systems  Outcome: Progressing Towards Goal     Problem: Afib Pathway: Day 2  Goal: Off Pathway (Use only if patient is Off Pathway)  Outcome: Progressing Towards Goal  Goal: Activity/Safety  Outcome: Progressing Towards Goal  Goal: Consults, if ordered  Outcome: Progressing Towards Goal  Goal: Diagnostic Test/Procedures  Outcome: Progressing Towards Goal  Goal: Nutrition/Diet  Outcome: Progressing Towards Goal  Goal: Discharge Planning  Outcome: Progressing Towards Goal  Goal: Medications  Outcome: Progressing Towards Goal  Goal: Respiratory  Outcome: Progressing Towards Goal  Goal: Treatments/Interventions/Procedures  Outcome: Progressing Towards Goal  Goal: Psychosocial  Outcome: Progressing Towards Goal  Goal: *Hemodynamically stable  Outcome: Progressing Towards Goal  Goal: *Optimal pain control at patient's stated goal  Outcome: Progressing Towards Goal  Goal: *Stable cardiac rhythm  Outcome: Progressing Towards Goal  Goal: *Lungs clear or at baseline  Outcome: Progressing Towards Goal  Goal: *Describes available resources and support systems  Outcome: Progressing Towards Goal     Problem: Afib Pathway: Day 3  Goal: Off Pathway (Use only if patient is Off Pathway)  Outcome: Progressing Towards Goal  Goal: Activity/Safety  Outcome: Progressing Towards Goal  Goal: Diagnostic Test/Procedures  Outcome: Progressing Towards Goal  Goal: Nutrition/Diet  Outcome: Progressing Towards Goal  Goal: Discharge Planning  Outcome: Progressing Towards Goal  Goal: Medications  Outcome: Progressing Towards Goal  Goal: Respiratory  Outcome: Progressing Towards Goal  Goal: Treatments/Interventions/Procedures  Outcome: Progressing Towards Goal  Goal: Psychosocial  Outcome: Progressing Towards Goal     Problem: Afib: Discharge Outcomes (not in CAT)  Goal: *Hemodynamically stable  Outcome: Progressing Towards Goal  Goal: *Stable cardiac rhythm  Outcome: Progressing Towards Goal  Goal: *Lungs clear or at baseline  Outcome: Progressing Towards Goal  Goal: *Optimal pain control at patient's stated goal  Outcome: Progressing Towards Goal  Goal: *Identifies cardiac risk factors  Outcome: Progressing Towards Goal  Goal: *Verbalizes home exercise program, activity guidelines, cardiac precautions  Outcome: Progressing Towards Goal  Goal: *Verbalizes understanding and describes prescribed diet  Outcome: Progressing Towards Goal  Goal: *Verbalizes understanding and describes medication purposes and frequencies  Outcome: Progressing Towards Goal  Goal: *Anxiety reduced or absent  Outcome: Progressing Towards Goal  Goal: *Understands and describes signs and symptoms to report to providers(Stroke Metric)  Outcome: Progressing Towards Goal  Goal: *Describes follow-up/return visits to physicians  Outcome: Progressing Towards Goal  Goal: *Describes available resources and support systems  Outcome: Progressing Towards Goal  Goal: *Influenza immunization  Outcome: Progressing Towards Goal  Goal: *Pneumococcal immunization  Outcome: Progressing Towards Goal  Goal: *Describes smoking cessation resources  Outcome: Progressing Towards Goal     Problem: Pain  Goal: *Control of Pain  Outcome: Progressing Towards Goal  Goal: *PALLIATIVE CARE:  Alleviation of Pain  Outcome: Progressing Towards Goal     Problem: Patient Education: Go to Patient Education Activity  Goal: Patient/Family Education  Outcome: Progressing Towards Goal     Problem: General Medical Care Plan  Goal: *Vital signs within specified parameters  Outcome: Progressing Towards Goal  Goal: *Labs within defined limits  Outcome: Progressing Towards Goal  Goal: *Absence of infection signs and symptoms  Outcome: Progressing Towards Goal  Goal: *Optimal pain control at patient's stated goal  Outcome: Progressing Towards Goal  Goal: *Skin integrity maintained  Outcome: Progressing Towards Goal  Goal: *Fluid volume balance  Outcome: Progressing Towards Goal  Goal: *Optimize nutritional status  Outcome: Progressing Towards Goal  Goal: *Anxiety reduced or absent  Outcome: Progressing Towards Goal  Goal: *Progressive mobility and function (eg: ADL's)  Outcome: Progressing Towards Goal     Problem: Patient Education: Go to Patient Education Activity  Goal: Patient/Family Education  Outcome: Progressing Towards Goal

## 2022-12-19 NOTE — PROGRESS NOTES
0700:  Bedside shift change report given to KAI Reilly RN (oncoming nurse) by YEISON Harkins RN (offgoing nurse). Report included the following information SBAR, Kardex, Intake/Output, MAR, and Recent Results. 0730:  Initial assessment complete, see flowsheet.    3061:  Patient resting quietly after morphine administration earlier, see MAR, for FLACC of 6. Monitoring her BP.    1100:  Notified physician of low BP, orders were placed for  mL bolus now. 1348:  Patient's BP WDL s/p  mL bolus. Patient remains on cardizem gtt. 1500:  Patient's HR is averaging in the higher 110's to 120's after pain management intervention. Titrated cardizem gtt, see MAR.    1700:  Patient has been clean of incontinence throughout shift. Patient has not been alert enough to take in anything PO today, does not follow commands. 1900:  Bedside shift change report given to *** (oncoming nurse) by Gary Lora RN (offgoing nurse). Report included the following information SBAR, Kardex, Intake/Output, MAR, and Recent Results.

## 2022-12-19 NOTE — DISCHARGE SUMMARY
Discharge Summary       PATIENT ID: Javier Arnett  MRN: [id number]   YOB: 1931    DATE OF ADMISSION: 12/15/2022  6:33 PM    DATE OF DISCHARGE: 12/19/22    PRIMARY CARE PROVIDER: None     ATTENDING PHYSICIAN: Sada Donato MD  DISCHARGING PROVIDER: Sada Donato MD        CONSULTATIONS: IP CONSULT TO CARDIOLOGY  IP CONSULT TO CARDIOLOGY    PROCEDURES/SURGERIES: * No surgery found *    ADMITTING [facility] COURSE:      Javier Arnett is a 80 y.o. female  has a past medical history of Blood disorder and Dementia ([facility] [address] Patient seen at bedside in emergency department. Patient's son is with patient and reports that patient just slid down the wall and moved in a way that her foot twisted and angulated her leg. Patient did not trip and fall it was suggested that she lost her footing. Patient's son is in room reports all of the history patient has significant Alzheimer's dementia with confusion. Patient is pleasant but at baseline per son. Patient stays at home with her . Patient is excepted at [facility] with the accepting orthopedics Dr. Ramone Garcia and hospitalist Dr. Natalia Heart. There are no beds available at [address] [place] [address]  at this time patient's Coumadin will be held at this time she is on it for chronic atrial fibrillation, she will not be held n.p.o. at this time I will order regular diet.         DISCHARGE DIAGNOSES / PLAN:      Hospital course / Assessment    Principle Problems:  Femur fracture (HCC)  Patient has comminuted angulated distal right femoral fracture  She is accepted at [facility] to Dr. Ramone Garcia orthopedics with Dr. Natalia Heart as Accepting hospitalist  Orthopedics not available this week in this facility, ED recommended admission waiting for transfer - she is at high risk of complication as admission INR is 4.1  Pain control  Hold Coumadin, INR 4.1 , s/p dose of Vit K -> 2.3  Traction if tolerated   Waiting on transfer  Acute Anemia secondary to acute blood loss from trauma / hematoma with elevated INR  Hgb 9.3 ->7.8->7.1, given her underlying uncontrolled Afib and expected surgery, will transfuse 1 unit of blood if obtained from [id number] monitor H/H and INR   Cont' with PO iron and folate   -- per blood bank, pt has sig cold agglutinins, and we do not have the equipment to be able to transfuse this pt  -- another good reason to transfer, currently hgb 6.8    Atrial fibrillation, chronic (HCC)  Tele, coumadin on hold pre op  Required a bolus of IV Cardizem for Rapid Afib-RVR  Cont' with PO cardizem  Cardiology consulted and following   trend INR - 4.1 ->2. 3   Alzheimer's dementia ([facility] [address]  Continue Aricept, Namenda        Code Status: Partial - DNI but want compression, medications, and defibrillation, but does not want her to be intubated  DVT prophylaxis: pharmacologic and mechanical      Pt is accepted at [facility], not only do we not have ortho, but this fracture is comminuted and angulated distal right femoral diaphyseal. Evidence of  prior right total hip arthroplasty. Updated son today, family friend is trauma ortho at [facility] [place], trying to obtain a bed for her. Discussed case with transfer center, no beds available to [facility] or [facility]. For now will cont to await bed and [facility]. Start hep sq, 5000 units, tid.           DIET: Regular Diet      DISCHARGE MEDICATIONS:    Current Facility-Administered Medications:     heparin (porcine) injection 5,000 Units, 5,000 Units, SubCUTAneous, Q8H, María Saldana MD, 5,000 Units at 12/19/22 1023    morphine injection 4 mg, 4 mg, IntraVENous, Q3H PRN, María Saldana MD, 4 mg at 12/19/22 1420    0.9% sodium chloride infusion 250 mL, 250 mL, IntraVENous, PRN, Dell MERIDA MD    dilTIAZem (CARDIZEM) 125 mg in 0.9% sodium chloride 125 mL ([id number]), 0-15 mg/hr, IntraVENous, TITRATE, Gali Monteiro NP, Last Rate: 12.5 mL/hr at 12/19/22 1455, 12.5 mg/hr at 12/19/22 1455    0.9% sodium chloride infusion 250 mL, 250 mL, IntraVENous, PRN, Mariajose Chahal MD    0.9% sodium chloride infusion, 100 mL/hr, IntraVENous, CONTINUOUS, Mariajose Chahal MD, Last Rate: 100 mL/hr at 12/19/22 0815, 100 mL/hr at 12/19/22 0815    oxyCODONE-acetaminophen (PERCOCET) 5-325 mg per tablet 1 Tablet, 1 Tablet, Oral, Q4H PRN, María Saldana MD, 1 Tablet at 12/17/22 2231    dilTIAZem ER (CARDIZEM CD) capsule 120 mg, 120 mg, Oral, DAILY, María Saldana MD, 120 mg at 11/62/85 3031    folic acid (FOLVITE) tablet 1 mg, 1 mg, Oral, DAILY, Nathan Gallon A, NP, 1 mg at 12/18/22 0801    ferrous sulfate tablet 325 mg, 325 mg, Oral, ACB, Nathan Gallon A, NP, 325 mg at 12/18/22 0636    [Held by provider] donepeziL (ARICEPT) tablet 5 mg, 5 mg, Oral, QHS, Nathan Gallon A, NP, 5 mg at 12/17/22 2103    memantine (NAMENDA) tablet 10 mg, 10 mg, Oral, BID, Nathan Gallon A, NP, 10 mg at 12/18/22 0801    sodium chloride (NS) flush 5-40 mL, 5-40 mL, IntraVENous, Q8H, Coral Mota A, NP, 10 mL at 12/19/22 1420    sodium chloride (NS) flush 5-40 mL, 5-40 mL, IntraVENous, PRN, Nathan Gallon A, NP    acetaminophen (TYLENOL) tablet 650 mg, 650 mg, Oral, Q6H PRN **OR** acetaminophen (TYLENOL) suppository 650 mg, 650 mg, Rectal, Q6H PRN, Nathan Gallon A, NP    polyethylene glycol (MIRALAX) packet 17 g, 17 g, Oral, DAILY PRN, Nathan Gallon A, NP    ondansetron (ZOFRAN ODT) tablet 4 mg, 4 mg, Oral, Q8H PRN **OR** ondansetron (ZOFRAN) injection 4 mg, 4 mg, IntraVENous, Q6H PRN, Nilda Paz NP           DISPOSITION:acute care hospital      PATIENT CONDITION AT DISCHARGE:     Functional status   x Poor     Deconditioned     Independent        PHYSICAL EXAMINATION AT DISCHARGE:  General:          asleep, arousible, cooperative, no distress, appears stated age.    elderly white female  HEENT:           Atraumatic, anicteric sclerae, pink conjunctivae                          No oral ulcers, mucosa moist, throat clear, dentition fair  Neck:               Supple, symmetrical  Lungs:             Clear to auscultation bilaterally. No Wheezing or Rhonchi. No rales. Chest wall:      No tenderness  No Accessory muscle use. Heart:              irreg irreg, tachy 110  Abdomen:        Soft, non-tender. Not distended. Bowel sounds normal  Extremities:     No cyanosis. No clubbing,                            Skin turgor normal, Capillary refill normal  Skin:                Not pale. Not Jaundiced  No rashes   Psych:             Not anxious or agitated.   Neurologic:      \moves all extremities,      CHRONIC MEDICAL DIAGNOSES:  Problem List as of 12/19/2022 Never Reviewed            Codes Class Noted - Resolved    Femoral fracture ([facility] ICD-10-CM: S72.90XA  ICD-9-CM: 821.00  12/17/2022 - Present        Alzheimer's dementia ([facility] ICD-10-CM: G30.9, F02.80  ICD-9-CM: 331.0, 294.10  12/16/2022 - Present        * (Principal) Femur fracture ([facility] ICD-10-CM: S72.90XA  ICD-9-CM: 821.00  12/15/2022 - Present        Atrial fibrillation, chronic ([facility] ICD-10-CM: I48.20  ICD-9-CM: 427.31  12/15/2022 - Present           Greater than 35 minutes were spent with the patient on counseling and coordination of care    Signed:   Ariadne Griffin MD  12/19/2022  2:46 PM

## 2022-12-19 NOTE — PROGRESS NOTES
[facility] Cardiology Progress Note      Patient Name: Gris Galeano  Gender: female  : 1931  Age:89 y.o. Patient seen and examined. This is a patient who is followed for afib with RVR on Dilt gtt. Resting in bed with eyes closed. Patient is non-verbal. Discussed care with nursing staff who reports right hip fracture with traction in place and low normal BP. Patient is not tolerating any PO medication at present. Echo present in room. Discussed care with attending - awaiting bed from outside facility for ortho surgical management. No other complaints reported. Telemetry reviewed, there were no events noted in the past 24 hours. Pertinent review of system items noted above, all other systems are negative. Current medications reviewed. Physical Examination  No apparent distress. Cardiac: irregular rate and rhythm. Normal S1, S2, no murmurs are appreciated. Lungs are diminished throughout  Abdomen is soft, nontender, normal bowel sounds. Extremities: Right femur fracture -  traction in place    Labs reviewed: All lab results for the last 24 hours reviewed. Visit Vitals  BP (!) 96/40 (BP 1 Location: Left upper arm, BP Patient Position: At rest)   Pulse (!) 113   Temp 97.6 °F (36.4 °C)   Resp 13   Ht 5' 7\" (1.702 m)   Wt 60.8 kg (134 lb)   SpO2 100%   BMI 20.99 kg/m²       Impression and Recommendations    Atrial fibrillation with RVR:  - on Cardizem IV - rate moderately controlled -  previously on Coumadin - currently held in the presence of anemia (recent Hgb 6.8)requiring transfusion. Not currently tolerating any meds PO.  - echo done - preliminary EF 60-65% with enlarged RA/LA - pending final read      Discussed plan of care with attending. Patient is awaiting OSH transfer for Ortho surgical management. Please do no hesitate to call if additional questions arise.     Tess Collet, ARNP  2022

## 2022-12-19 NOTE — PROGRESS NOTES
1900-Report received from off going nurse. Assumed care of patient. 2005-Patient . Notified ISAÍAS Ochoa.    2007-Cardizem 10 mg  IV push administered by SARATH Zambrano. Patient tolerated well. Will continue to monitor    2025-Patient HR is now 126.      2210-Patients 's. Katy Garcia NP notified. Patient will be transferred to ICU. Supervisor aware. 2230-Report given to YEISON Brannon RN.

## 2022-12-19 NOTE — PROGRESS NOTES
Called by nursing regarding patient in A. fib RVR with heart rate in the 150s. Patient has already been given Cardizem bolus 10 mg IV x2. We will go ahead and have patient transferred to ICU and initiate Cardizem drip.

## 2022-12-19 NOTE — PROGRESS NOTES
Called and spoke with Ruma Paredes, patient's son, updated on patient condition and transfer to ICU. Consent obtained for blood transfusion via phone. Patient reports he believes that she underwent a blood transfusion around 7 years ago when she had a repair of a hip fracture. Questions and concerns addressed. Plan of care discussed.

## 2022-12-19 NOTE — PROGRESS NOTES
Problem: Pressure Injury - Risk of  Goal: *Prevention of pressure injury  Description: Document Harshad Scale and appropriate interventions in the flowsheet.   Outcome: Not Progressing Towards Goal  Note: Pressure Injury Interventions:  Sensory Interventions: Assess changes in LOC    Moisture Interventions: Absorbent underpads    Activity Interventions: Assess need for specialty bed    Mobility Interventions: Assess need for specialty bed    Nutrition Interventions: Document food/fluid/supplement intake    Friction and Shear Interventions: Apply protective barrier, creams and emollients

## 2022-12-19 NOTE — PROGRESS NOTES
Spoke with the blood bank regarding patient transfusion. Apparently the patient has factor V Leiden and they are having difficulty in the blood bank because of that. Will have blood sent to [facility] in hopes that they are able to find appropriate blood for the patient. Blood bank tech states this may take up to 24 hours. I suspect the patient needs blood versus IV fluid as she has had a drop in hemoglobin from 9.3-7.1.

## 2022-12-19 NOTE — PROGRESS NOTES
Problem: Pressure Injury - Risk of  Goal: *Prevention of pressure injury  Description: Document Harshad Scale and appropriate interventions in the flowsheet. Outcome: Progressing Towards Goal  Note: Pressure Injury Interventions:  Sensory Interventions: Assess changes in LOC    Moisture Interventions: Absorbent underpads    Activity Interventions: Assess need for specialty bed    Mobility Interventions: Assess need for specialty bed    Nutrition Interventions: Document food/fluid/supplement intake    Friction and Shear Interventions: Apply protective barrier, creams and emollients                Problem: Falls - Risk of  Goal: *Absence of Falls  Description: Document Rene Fall Risk and appropriate interventions in the flowsheet.   Outcome: Progressing Towards Goal  Note: Fall Risk Interventions:       Mentation Interventions: Door open when patient unattended              History of Falls Interventions: Bed/chair exit alarm         Problem: Pain  Goal: *Control of Pain  Outcome: Progressing Towards Goal     Problem: General Medical Care Plan  Goal: *Skin integrity maintained  Outcome: Progressing Towards Goal     Problem: Nutrition Deficit  Goal: *Optimize nutritional status  Outcome: Not Progressing Towards Goal     Problem: General Medical Care Plan  Goal: *Vital signs within specified parameters  Outcome: Not Progressing Towards Goal  Goal: *Labs within defined limits  Outcome: Not Progressing Towards Goal  Goal: *Optimal pain control at patient's stated goal  Outcome: Not Progressing Towards Goal  Goal: *Optimize nutritional status  Outcome: Not Progressing Towards Goal  Goal: *Anxiety reduced or absent  Outcome: Not Progressing Towards Goal  Goal: *Progressive mobility and function (eg: ADL's)  Outcome: Not Progressing Towards Goal

## 2022-12-20 NOTE — PROGRESS NOTES
Bedside shift change report given to YEISON Gordillo LPN (oncoming nurse) by Cici Prabhakar. Loren Bender, RN (offgoing nurse). Report included the following information SBAR, Kardex, Intake/Output, MAR, Recent Results, and Quality Measures.

## 2022-12-20 NOTE — PROGRESS NOTES
[facility] Cardiology Progress Note      Patient Name: Jose Maunel Harper  Gender: female  : 1931  Age:89 y.o. Patient seen and examined. This is a patient who is followed for afib with RVR on Dilt gtt. Resting in bed with eyes closed. Patient is non-verbal. RN reports patient just received morphine for discomfort. Patient is not tolerating any PO medication at present. Patient awaiting bed from outside facility for ortho surgical management. No other complaints reported. Telemetry reviewed, there were no events noted in the past 24 hours. Pertinent review of system items noted above, all other systems are negative. Current medications reviewed. Physical Examination  No apparent distress. Cardiac: irregular rate and rhythm. Normal S1, S2, no murmurs are appreciated. Lungs are diminished throughout  Abdomen is soft, nontender, normal bowel sounds. Extremities: Right femur fracture -  traction in place    Labs reviewed: All lab results for the last 24 hours reviewed. Visit Vitals  BP (!) 100/51 (BP 1 Location: Left upper arm, BP Patient Position: At rest)   Pulse (!) 121   Temp 97.9 °F (36.6 °C)   Resp 15   Ht 5' 7\" (1.702 m)   Wt 60.8 kg (134 lb)   SpO2 98%   BMI 20.99 kg/m²       Impression and Recommendations    Atrial fibrillation with RVR:  - on Cardizem IV - rate moderately controlled -  previously on Coumadin - currently held in the presence of anemia (recent Hgb 6.8)requiring transfusion. Not currently tolerating any meds PO.  - echo done - preliminary EF 60-65% with enlarged RA/LA - pending final read      Discussed plan of care with attending. Patient is awaiting OSH transfer for Ortho surgical management. Please do no hesitate to call if additional questions arise.     HANH Ramires  2022

## 2022-12-20 NOTE — PROGRESS NOTES
Attempts x3 nurses to insert NGT w/o success. Will notify NP and night shift nurses. Resting quietly at this time. Remains in A-Fib w/-140. Frequent attempts made to provide oral care. Patient clamps teeth together. Able to cleanse lips only w/moisturizer applied. Will continue to monitor. CB in reach.

## 2022-12-20 NOTE — PROGRESS NOTES
Problem: Falls - Risk of  Goal: *Absence of Falls  Description: Document Miriam Duval Fall Risk and appropriate interventions in the flowsheet.   Outcome: Progressing Towards Goal  Note: Fall Risk Interventions:       Mentation Interventions: (P) Bed/chair exit alarm, Door open when patient unattended    Medication Interventions: (P) Bed/chair exit alarm    Elimination Interventions: (P) Bed/chair exit alarm    History of Falls Interventions: (P) Bed/chair exit alarm, Door open when patient unattended

## 2022-12-20 NOTE — PROGRESS NOTES
in to visit. Denies questions at this time. Took patient's shoes home. Patient remains asleep at intervals. IVF and Cardizem gtt continue w/o difficulty. Will continue to monitor. CB in reach.

## 2022-12-20 NOTE — PROGRESS NOTES
Problem: Pressure Injury - Risk of  Goal: *Prevention of pressure injury  Description: Document Harshad Scale and appropriate interventions in the flowsheet. Outcome: Progressing Towards Goal  Note: Pressure Injury Interventions:  Sensory Interventions: Assess changes in LOC, Avoid rigorous massage over bony prominences, Keep linens dry and wrinkle-free, Maintain/enhance activity level, Minimize linen layers, Check visual cues for pain    Moisture Interventions: Check for incontinence Q2 hours and as needed, Internal/External urinary devices, Minimize layers, Maintain skin hydration (lotion/cream)    Activity Interventions: Pressure redistribution bed/mattress(bed type)    Mobility Interventions: Pressure redistribution bed/mattress (bed type)    Nutrition Interventions: Document food/fluid/supplement intake, Discuss nutritional consult with provider, Offer support with meals,snacks and hydration    Friction and Shear Interventions: Apply protective barrier, creams and emollients, Minimize layers                Problem: Patient Education: Go to Patient Education Activity  Goal: Patient/Family Education  Outcome: Progressing Towards Goal     Problem: Falls - Risk of  Goal: *Absence of Falls  Description: Document Rene Fall Risk and appropriate interventions in the flowsheet.   Outcome: Progressing Towards Goal  Note: Fall Risk Interventions:       Mentation Interventions: Door open when patient unattended              History of Falls Interventions: Bed/chair exit alarm         Problem: Patient Education: Go to Patient Education Activity  Goal: Patient/Family Education  Outcome: Progressing Towards Goal     Problem: Nutrition Deficit  Goal: *Optimize nutritional status  Outcome: Progressing Towards Goal     Problem: Patient Education: Go to Patient Education Activity  Goal: Patient/Family Education  Outcome: Progressing Towards Goal     Problem: Afib Pathway: Day 1  Goal: Off Pathway (Use only if patient is Off Pathway)  Outcome: Progressing Towards Goal  Goal: Activity/Safety  Outcome: Progressing Towards Goal  Goal: Consults, if ordered  Outcome: Progressing Towards Goal  Goal: Diagnostic Test/Procedures  Outcome: Progressing Towards Goal  Goal: Nutrition/Diet  Outcome: Progressing Towards Goal  Goal: Discharge Planning  Outcome: Progressing Towards Goal  Goal: Medications  Outcome: Progressing Towards Goal  Goal: Respiratory  Outcome: Progressing Towards Goal  Goal: Treatments/Interventions/Procedures  Outcome: Progressing Towards Goal  Goal: Psychosocial  Outcome: Progressing Towards Goal  Goal: *Optimal pain control at patient's stated goal  Outcome: Progressing Towards Goal  Goal: *Hemodynamically stable  Outcome: Progressing Towards Goal  Goal: *Stable cardiac rhythm  Outcome: Progressing Towards Goal  Goal: *Lungs clear or at baseline  Outcome: Progressing Towards Goal  Goal: *Labs within defined limits  Outcome: Progressing Towards Goal  Goal: *Describes available resources and support systems  Outcome: Progressing Towards Goal     Problem: Afib Pathway: Day 2  Goal: Off Pathway (Use only if patient is Off Pathway)  Outcome: Progressing Towards Goal  Goal: Activity/Safety  Outcome: Progressing Towards Goal  Goal: Consults, if ordered  Outcome: Progressing Towards Goal  Goal: Diagnostic Test/Procedures  Outcome: Progressing Towards Goal  Goal: Nutrition/Diet  Outcome: Progressing Towards Goal  Goal: Discharge Planning  Outcome: Progressing Towards Goal  Goal: Medications  Outcome: Progressing Towards Goal  Goal: Respiratory  Outcome: Progressing Towards Goal  Goal: Treatments/Interventions/Procedures  Outcome: Progressing Towards Goal  Goal: Psychosocial  Outcome: Progressing Towards Goal  Goal: *Hemodynamically stable  Outcome: Progressing Towards Goal  Goal: *Optimal pain control at patient's stated goal  Outcome: Progressing Towards Goal  Goal: *Stable cardiac rhythm  Outcome: Progressing Towards Goal  Goal: *Lungs clear or at baseline  Outcome: Progressing Towards Goal  Goal: *Describes available resources and support systems  Outcome: Progressing Towards Goal     Problem: Afib Pathway: Day 3  Goal: Off Pathway (Use only if patient is Off Pathway)  Outcome: Progressing Towards Goal  Goal: Activity/Safety  Outcome: Progressing Towards Goal  Goal: Diagnostic Test/Procedures  Outcome: Progressing Towards Goal  Goal: Nutrition/Diet  Outcome: Progressing Towards Goal  Goal: Discharge Planning  Outcome: Progressing Towards Goal  Goal: Medications  Outcome: Progressing Towards Goal  Goal: Respiratory  Outcome: Progressing Towards Goal  Goal: Treatments/Interventions/Procedures  Outcome: Progressing Towards Goal  Goal: Psychosocial  Outcome: Progressing Towards Goal     Problem: Pain  Goal: *Control of Pain  Outcome: Progressing Towards Goal  Goal: *PALLIATIVE CARE:  Alleviation of Pain  Outcome: Progressing Towards Goal     Problem: Patient Education: Go to Patient Education Activity  Goal: Patient/Family Education  Outcome: Progressing Towards Goal

## 2022-12-20 NOTE — PROGRESS NOTES
1845- Bedside shift change report given to Isaac Hogan RN (oncoming nurse) by Antoine Lees RN (offgoing nurse). Report included the following information SBAR, Kardex, ED Summary, Intake/Output, MAR, Accordion, Recent Results, and Cardiac Rhythm AFIB . Received patient resting in bed, eyes closed. Patient moaning, unable to respond to verbal command. Cardizem gtt verified at this time with off going nurse. Bed in lowest and locked position, bucks traction in place to RLE, call bell in reach. Bed in lowest and locked position. 1910- Cardizem gtt rate change at this time due to hypotension. PT moaning in pain, will call to provider for suggestiong on pain management. 0- New order received for one time dose of benadryl 12.5mg for pain at this time, as BP is soft. 1945- Provider at bedside. 2120- Patient moaning in bed, grimacing face, attempting to move body, FLACC score indicates 7/10 pain score. PRN medication given at this time, as BP is soft, and patient unable to take anything PO at this time. 2251- Patient moaning in bed. Patient repositioned at this time. Patient sensitive to any movement, yells in pain. Tolerated repositioning poor. Cardizem gtt remains on 7.5mg/hr, with HR fluctuating between 99-132bpm on monitor. BP remains soft 103/34.     0000- Reassessment completed at this time. No changes noted. HR and BP remain labile, patient intermittently moans in pain. Cardizem gtt remains on, titrates as medically necessary. 2730- Patient resting in bed, eyes closed. Respirations even and unlabored, no evidence of pain or discomfort at this time. 6304- Morning labs drawn at this time. Patient tolerates fairly well. 0696- Patient moans in bed, grimacing, unable to verbalize location of pain. FLACC pain score reveals 7/10. PRN medication given at this time. 9222- Bedside shift change report given to Reagan Merritt RN (oncoming nurse) by Isaac Hogan RN (offgoing nurse).  Report included the following information SBAR, Kardex, ED Summary, Intake/Output, MAR, Accordion, Recent Results, and Cardiac Rhythm AFIB .

## 2022-12-20 NOTE — PROGRESS NOTES
Comprehensive Nutrition Assessment    Type and Reason for Visit: Reassess    Nutrition Recommendations/Plan:   Pt has gained 19 pounds since admission - recommend re-check weight/consider reducing IVF and change to D5 1/2 NS. If EN warranted would begin Vital AF 1.2 at 10 ml/hr and increase by 10 ml every 6 hours to goal 45 ml/hr. Supplement Phosphorus (current level 2.2) Monitor Phosphorus, Magnesium and BMP daily,  replete  electrolyte/mineral as needed   Flush NGT with 100 ml H2O every 4 hours. Discontinue Supplement (pt not drinking)      Malnutrition Assessment:  Malnutrition Status:  Severe malnutrition (12/16/22 1442)    Context:  Acute illness     Findings of the 6 clinical characteristics of malnutrition:   Energy Intake:  75% or less of est energy req for 7 or more days  Weight Loss:  Unable to assess     Body Fat Loss: Moderate body fat loss, Buccal region, Fat overlying ribs, Orbital, Triceps   Muscle Mass Loss: Moderate muscle mass loss, Calf, Clavicles (pectoralis & deltoids), Hand (interosseous), Scapula (trapezius), Temples (temporalis), Thigh (quadriceps)  Fluid Accumulation:  No significant fluid accumulation,     Strength:  Not performed     Nutrition Assessment:    Intake 0- for the pasts 3 days. Spoke with MD and he will discuss NGT with family today while pt awaiting bed at [facility] for orthopedic surgery. Pt receiving D5 NS for hydration with elevated Na and Cl  IVF adjusted per MD.  Phosphorus low- recommend replace if EN not started. H/H 6.2/19.4- MD states unable to transfuse due to equipment needs with Pt's blood disorder.  Weight up 19 pounds from admission - IVF and protein malnutrition    Nutrition Related Findings:    Pt jaundiced with bilirubin 2.6 on 12/16/2022 Wound Type: None    Current Nutrition Intake & Therapies:  Average Meal Intake: Refusing to eat  Average Supplement Intake: Refusing to take  ADULT DIET Dysphagia - Soft & Bite Sized  ADULT ORAL NUTRITION SUPPLEMENT Breakfast, Lunch, Dinner; Standard High Calorie/High Protein  Current Tube Feeding (TF) Orders:   Feeding Route: Nasogastric  Formula: Peptide based  Schedule:Continuous    Regimen: Begin Vital AF 1.2 at 10 ml/hr and increase by 10 ml every 6 hours as tolerated to goal of 45 ml/hr. Total volume 1080 ml/in 24 hours. Keep HOB >30 degrees. Additives/Modulars: None  Water Flushes: 100 ml every 4 hours  Current TF & Flush Orders Provides:    Goal TF & Flush Orders Provides: 1300 Kcal, 81  gm protein, 1475 ml/d free H2O    Anthropometric Measures:  Height: 5' 7\" (170.2 cm)  Ideal Body Weight (IBW): 135 lbs (61 kg)  Admission Body Weight: 115 lb  Current Body Wt:  60.8 kg (134 lb 0.6 oz), 99.3 % IBW.  Bed scale  Current BMI (kg/m2): 21   Weight Adjustment: No adjustment   BMI Category: Underweight (BMI less than 22) age over 72    Estimated Daily Nutrient Needs:  Energy Requirements Based On: Kcal/kg     Energy (kcal/day): 0735-9677 Kcal/day  ( 30-35 Kcal/kg)  Weight Used for Protein Requirements: Current  Protein (g/day): 68-79 gm/d  Method Used for Fluid Requirements: 1 ml/kcal  Fluid (ml/day): 8334-2539 Ml/d    Nutrition Diagnosis:   Severe malnutrition, In context of acute illness or injury related to inadequate protein-energy intake, increased demand for energy/nutrients, renal dysfunction, cardiac dysfunction, endocrine dysfunction as evidenced by Criteria as identified in malnutrition assessment, intake 0-25%, severe loss of subcutaneous fat, severe muscle loss    Nutrition Interventions:   Food and/or Nutrient Delivery: Start tube feeding  Nutrition Education/Counseling: Education not indicated  Coordination of Nutrition Care: Continue to monitor while inpatient  Plan of Care discussed with: Provider    Goals:  Previous Goal Met: Progress towards goal(s) declining  Goals: Initiate nutrition support, within 2 days     Nutrition Monitoring and Evaluation:   Behavioral-Environmental Outcomes: None identified  Food/Nutrient Intake Outcomes: Enteral nutrition intake/tolerance  Physical Signs/Symptoms Outcomes: Biochemical data, Nutrition focused physical findings, Weight, Fluid status or edema    Discharge Planning:     Too soon to determine    [place]  Contact: [phone]

## 2022-12-20 NOTE — PROGRESS NOTES
and son at bedside discussing possibility of NGT placement for nutrition. Son requested applesauce to see if he could encourage her to eat. Will monitor results.

## 2022-12-20 NOTE — PROGRESS NOTES
Problem: Pressure Injury - Risk of  Goal: *Prevention of pressure injury  Description: Document Harshad Scale and appropriate interventions in the flowsheet.   12/19/2022 2036 by Lissa Lakhani RN  Outcome: Progressing Towards Goal  Note: Pressure Injury Interventions:  Sensory Interventions: Assess changes in LOC, Avoid rigorous massage over bony prominences, Keep linens dry and wrinkle-free, Maintain/enhance activity level, Minimize linen layers, Check visual cues for pain    Moisture Interventions: Check for incontinence Q2 hours and as needed, Internal/External urinary devices, Minimize layers, Maintain skin hydration (lotion/cream)    Activity Interventions: Pressure redistribution bed/mattress(bed type)    Mobility Interventions: Pressure redistribution bed/mattress (bed type)    Nutrition Interventions: Document food/fluid/supplement intake, Discuss nutritional consult with provider, Offer support with meals,snacks and hydration    Friction and Shear Interventions: Apply protective barrier, creams and emollients, Minimize layers             12/19/2022 1900 by Lissa Lakhani RN  Outcome: Progressing Towards Goal  Note: Pressure Injury Interventions:  Sensory Interventions: Assess changes in LOC, Avoid rigorous massage over bony prominences, Keep linens dry and wrinkle-free, Maintain/enhance activity level, Minimize linen layers, Check visual cues for pain    Moisture Interventions: Check for incontinence Q2 hours and as needed, Internal/External urinary devices, Minimize layers, Maintain skin hydration (lotion/cream)    Activity Interventions: Pressure redistribution bed/mattress(bed type)    Mobility Interventions: Pressure redistribution bed/mattress (bed type)    Nutrition Interventions: Document food/fluid/supplement intake, Discuss nutritional consult with provider, Offer support with meals,snacks and hydration    Friction and Shear Interventions: Apply protective barrier, creams and emollients, Minimize layers                Problem: Patient Education: Go to Patient Education Activity  Goal: Patient/Family Education  12/19/2022 2036 by Nicki Ross RN  Outcome: Progressing Towards Goal  12/19/2022 1900 by Nicki Ross RN  Outcome: Progressing Towards Goal     Problem: Falls - Risk of  Goal: *Absence of Falls  Description: Document [place] Fall Risk and appropriate interventions in the flowsheet.   12/19/2022 2036 by Nicki Ross RN  Outcome: Progressing Towards Goal  Note: Fall Risk Interventions:       Mentation Interventions: Door open when patient unattended              History of Falls Interventions: Bed/chair exit alarm      12/19/2022 1900 by Nicki Ross RN  Outcome: Progressing Towards Goal  Note: Fall Risk Interventions:       Mentation Interventions: Door open when patient unattended              History of Falls Interventions: Bed/chair exit alarm         Problem: Patient Education: Go to Patient Education Activity  Goal: Patient/Family Education  12/19/2022 2036 by Nicki Ross RN  Outcome: Progressing Towards Goal  12/19/2022 1900 by Nicki Ross RN  Outcome: Progressing Towards Goal     Problem: Nutrition Deficit  Goal: *Optimize nutritional status  12/19/2022 2036 by Nicki Ross RN  Outcome: Progressing Towards Goal  12/19/2022 1900 by Nicki Ross RN  Outcome: Progressing Towards Goal     Problem: Patient Education: Go to Patient Education Activity  Goal: Patient/Family Education  12/19/2022 2036 by Nicki Ross RN  Outcome: Progressing Towards Goal  12/19/2022 1900 by Nicki Ross RN  Outcome: Progressing Towards Goal     Problem: Afib Pathway: Day 1  Goal: Off Pathway (Use only if patient is Off Pathway)  12/19/2022 2036 by Nicki Ross RN  Outcome: Progressing Towards Goal  12/19/2022 1900 by Nicki Ross RN  Outcome: Progressing Towards Goal  Goal: Activity/Safety  12/19/2022 2036 by Nicki Ross RN  Outcome: Progressing Towards Goal  12/19/2022 1900 by Teresa Johnson RN  Outcome: Progressing Towards Goal  Goal: Consults, if ordered  12/19/2022 2036 by Teresa Johnson, RN  Outcome: Progressing Towards Goal  12/19/2022 1900 by Teresa Johnson, RN  Outcome: Progressing Towards Goal  Goal: Diagnostic Test/Procedures  12/19/2022 2036 by Teresa Johnson, RN  Outcome: Progressing Towards Goal  12/19/2022 1900 by Teresa Johnson, RN  Outcome: Progressing Towards Goal  Goal: Nutrition/Diet  12/19/2022 2036 by Teresa Johnson, RN  Outcome: Progressing Towards Goal  12/19/2022 1900 by Teresa Johnson, RN  Outcome: Progressing Towards Goal  Goal: Discharge Planning  12/19/2022 2036 by Teresa Johnson, RN  Outcome: Progressing Towards Goal  12/19/2022 1900 by Teresa Jonhson, RN  Outcome: Progressing Towards Goal  Goal: Medications  12/19/2022 2036 by Teresa Johnson, RN  Outcome: Progressing Towards Goal  12/19/2022 1900 by Teresa Johnson, RN  Outcome: Progressing Towards Goal  Goal: Respiratory  12/19/2022 2036 by Teresa Johnson, RN  Outcome: Progressing Towards Goal  12/19/2022 1900 by Teresa Johnson, RN  Outcome: Progressing Towards Goal  Goal: Treatments/Interventions/Procedures  12/19/2022 2036 by Teresa Johnson, RN  Outcome: Progressing Towards Goal  12/19/2022 1900 by Teresa Johnson, RN  Outcome: Progressing Towards Goal  Goal: Psychosocial  12/19/2022 2036 by Teresa Johnson, RN  Outcome: Progressing Towards Goal  12/19/2022 1900 by Teresa Johnson RN  Outcome: Progressing Towards Goal  Goal: *Optimal pain control at patient's stated goal  12/19/2022 2036 by Teresa Johnson, RN  Outcome: Progressing Towards Goal  12/19/2022 1900 by Teresa Johnson, RN  Outcome: Progressing Towards Goal  Goal: *Hemodynamically stable  12/19/2022 2036 by Teresa Johnson, RN  Outcome: Progressing Towards Goal  12/19/2022 1900 by Teresa Johnson, RN  Outcome: Progressing Towards Goal  Goal: *Stable cardiac rhythm  12/19/2022 2036 by Teresa Johnson, RN  Outcome: Progressing Towards Goal  12/19/2022 1900 by Cassiusana Pete RN  Outcome: Progressing Towards Goal  Goal: *Lungs clear or at baseline  12/19/2022 2036 by Cassius Juan R, RN  Outcome: Progressing Towards Goal  12/19/2022 1900 by Cassiusana Pete, RN  Outcome: Progressing Towards Goal  Goal: *Labs within defined limits  12/19/2022 2036 by Cassius Juan R, RN  Outcome: Progressing Towards Goal  12/19/2022 1900 by Cassiusana Pete, RN  Outcome: Progressing Towards Goal  Goal: *Describes available resources and support systems  12/19/2022 2036 by Cassiusana Pete, RN  Outcome: Progressing Towards Goal  12/19/2022 1900 by Cassiusana Pete, RN  Outcome: Progressing Towards Goal     Problem: Afib Pathway: Day 2  Goal: Off Pathway (Use only if patient is Off Pathway)  12/19/2022 2036 by Cassius Pete RN  Outcome: Progressing Towards Goal  12/19/2022 1900 by Cassiusana Pete, RN  Outcome: Progressing Towards Goal  Goal: Activity/Safety  12/19/2022 2036 by Cassiusana Pete, RN  Outcome: Progressing Towards Goal  12/19/2022 1900 by Cassiusana Pete RN  Outcome: Progressing Towards Goal  Goal: Consults, if ordered  12/19/2022 2036 by Cassius Juan R, RN  Outcome: Progressing Towards Goal  12/19/2022 1900 by Cassius Juan R, RN  Outcome: Progressing Towards Goal  Goal: Diagnostic Test/Procedures  12/19/2022 2036 by Cassius Juan R, RN  Outcome: Progressing Towards Goal  12/19/2022 1900 by Cassius Juan R, RN  Outcome: Progressing Towards Goal  Goal: Nutrition/Diet  12/19/2022 2036 by Cassius Juan R, RN  Outcome: Progressing Towards Goal  12/19/2022 1900 by Cassius Juan R, RN  Outcome: Progressing Towards Goal  Goal: Discharge Planning  12/19/2022 2036 by Cassius Juan R, RN  Outcome: Progressing Towards Goal  12/19/2022 1900 by Cassius Juan R, RN  Outcome: Progressing Towards Goal  Goal: Medications  12/19/2022 2036 by Cassius Juan R, RN  Outcome: Progressing Towards Goal  12/19/2022 1900 by Cassius Juan R, RN  Outcome: Progressing Towards Goal  Goal: Respiratory  12/19/2022 2036 by Darryn Reza RN  Outcome: Progressing Towards Goal  12/19/2022 1900 by Darryn Reza RN  Outcome: Progressing Towards Goal  Goal: Treatments/Interventions/Procedures  12/19/2022 2036 by Darryn Reza RN  Outcome: Progressing Towards Goal  12/19/2022 1900 by Darryn Reza RN  Outcome: Progressing Towards Goal  Goal: Psychosocial  12/19/2022 2036 by Darryn Reza RN  Outcome: Progressing Towards Goal  12/19/2022 1900 by Darryn Reza RN  Outcome: Progressing Towards Goal  Goal: *Hemodynamically stable  12/19/2022 2036 by Darryn Reza RN  Outcome: Progressing Towards Goal  12/19/2022 1900 by Darryn Reza RN  Outcome: Progressing Towards Goal  Goal: *Optimal pain control at patient's stated goal  12/19/2022 2036 by Darryn Reza RN  Outcome: Progressing Towards Goal  12/19/2022 1900 by Darryn Reza RN  Outcome: Progressing Towards Goal  Goal: *Stable cardiac rhythm  12/19/2022 2036 by Darryn Reza RN  Outcome: Progressing Towards Goal  12/19/2022 1900 by Darryn Reza RN  Outcome: Progressing Towards Goal  Goal: *Lungs clear or at baseline  12/19/2022 2036 by Darryn Reza RN  Outcome: Progressing Towards Goal  12/19/2022 1900 by Darryn Reza RN  Outcome: Progressing Towards Goal  Goal: *Describes available resources and support systems  12/19/2022 2036 by Darryn Reza RN  Outcome: Progressing Towards Goal  12/19/2022 1900 by Darryn Reza RN  Outcome: Progressing Towards Goal     Problem: Afib Pathway: Day 3  Goal: Off Pathway (Use only if patient is Off Pathway)  12/19/2022 2036 by Darryn Reza RN  Outcome: Progressing Towards Goal  12/19/2022 1900 by Darryn Reza RN  Outcome: Progressing Towards Goal  Goal: Activity/Safety  12/19/2022 2036 by Darryn Reza RN  Outcome: Progressing Towards Goal  12/19/2022 1900 by Darryn Reza RN  Outcome: Progressing Towards Goal  Goal: Diagnostic Test/Procedures  12/19/2022 2036 by Lisa Flores RN  Outcome: Progressing Towards Goal  12/19/2022 1900 by Lisa Flores RN  Outcome: Progressing Towards Goal  Goal: Nutrition/Diet  12/19/2022 2036 by Lisa Flores RN  Outcome: Progressing Towards Goal  12/19/2022 1900 by Lisa Flores RN  Outcome: Progressing Towards Goal  Goal: Discharge Planning  12/19/2022 2036 by Lisa Flores RN  Outcome: Progressing Towards Goal  12/19/2022 1900 by Lisa Flores RN  Outcome: Progressing Towards Goal  Goal: Medications  12/19/2022 2036 by Lisa Flores RN  Outcome: Progressing Towards Goal  12/19/2022 1900 by Lisa Flores RN  Outcome: Progressing Towards Goal  Goal: Respiratory  12/19/2022 2036 by Lisa Flores RN  Outcome: Progressing Towards Goal  12/19/2022 1900 by Lisa Flores RN  Outcome: Progressing Towards Goal  Goal: Treatments/Interventions/Procedures  12/19/2022 2036 by Lisa Flores RN  Outcome: Progressing Towards Goal  12/19/2022 1900 by Lisa Flores RN  Outcome: Progressing Towards Goal  Goal: Psychosocial  12/19/2022 2036 by Lisa Flores RN  Outcome: Progressing Towards Goal  12/19/2022 1900 by Lisa Flores RN  Outcome: Progressing Towards Goal     Problem: Pain  Goal: *Control of Pain  12/19/2022 2036 by Lisa Flores RN  Outcome: Progressing Towards Goal  12/19/2022 1900 by Lisa Flores RN  Outcome: Progressing Towards Goal  Goal: *PALLIATIVE CARE:  Alleviation of Pain  12/19/2022 2036 by Lisa Flores RN  Outcome: Progressing Towards Goal  12/19/2022 1900 by Lisa Flores RN  Outcome: Progressing Towards Goal     Problem: Patient Education: Go to Patient Education Activity  Goal: Patient/Family Education  12/19/2022 2036 by Lisa Flores RN  Outcome: Progressing Towards Goal  12/19/2022 1900 by Lisa Flores RN  Outcome: Progressing Towards Goal

## 2022-12-20 NOTE — PROGRESS NOTES
Rounding, assessment, VS and white board updated. Resting in bed calling out at intervals. Disoriented to place, time and situation. IV Cardizem ongoing @ 7.5ml/hr w/NS @ 100 ml/hr. 5# bucks traction to RLE. Repositioned for comfort and pressure relief. Safety measures in place. Bed low/locked, Srx2, CB and all possessions in reach. Will continue to monitor.

## 2022-12-20 NOTE — DISCHARGE SUMMARY
Discharge Summary       PATIENT ID: Vishal Marin  MRN: [id number]   YOB: 1931    DATE OF ADMISSION: 12/15/2022  6:33 PM    DATE OF DISCHARGE: 12/20/22    PRIMARY CARE PROVIDER: None     ATTENDING PHYSICIAN: Mayo Boyle MD  DISCHARGING PROVIDER: Mayo Boyle MD        CONSULTATIONS: IP CONSULT TO CARDIOLOGY  IP CONSULT TO CARDIOLOGY    PROCEDURES/SURGERIES: * No surgery found *    ADMITTING [address] [place] [address] COURSE:      Vishal Marin is a 80 y.o. female  has a past medical history of Blood disorder and Dementia ([facility] Patient seen at bedside in emergency department. Patient's son is with patient and reports that patient just slid down the wall and moved in a way that her foot twisted and angulated her leg. Patient did not trip and fall it was suggested that she lost her footing. Patient's son is in room reports all of the history patient has significant Alzheimer's dementia with confusion. Patient is pleasant but at baseline per son. Patient stays at home with her . Patient is excepted at [facility] with the accepting orthopedics Dr. Dang Saldivar and hospitalist Dr. Kosta Baires. There are no beds available at [facility] at this time patient's Coumadin will be held at this time she is on it for chronic atrial fibrillation, she will not be held n.p.o. at this time I will order regular diet.         DISCHARGE DIAGNOSES / PLAN:      Hospital course / Assessment    Principle Problems:  Femur fracture (HCC)  Patient has comminuted angulated distal right femoral fracture  She is accepted at [place] [address] to Dr. Dang Saldivar orthopedics with Dr. Kosta Baires as Accepting hospitalist  Orthopedics not available this week in this facility, ED recommended admission waiting for transfer - she is at high risk of complication as admission INR is 4.1  Pain control  Hold Coumadin, INR 4.1 , s/p dose of Vit K -> 2.3  Traction if tolerated   Waiting on transfer  Acute Anemia secondary to acute blood loss from trauma / hematoma with elevated INR-   Hgb 9.3 ->7.8->7.1, 6.2 today, given her underlying uncontrolled Afib and expected surgery, will transfuse 1 unit of blood if obtained from [id number] monitor H/H and INR   Cont' with PO iron and folate   -- per blood bank, pt has sig cold agglutinins, and we do not have the equipment to be able to transfuse this pt  -- another good reason to transfer, currently hgb 6.8    Atrial fibrillation, chronic (HCC)  Tele, coumadin on hold pre op  Required a bolus of IV Cardizem for Rapid Afib-RVR  Cont' with PO cardizem  Cardiology consulted and following   trend INR - 4.1 ->2. 3   Alzheimer's dementia ([facility] [address]  Continue Aricept, Namenda        Code Status: Partial - DNI but want compression, medications, and defibrillation, but does not want her to be intubated  DVT prophylaxis: pharmacologic and mechanical      Pt is accepted at [facility], not only do we not have ortho, but this fracture is comminuted and angulated distal right femoral diaphyseal. Evidence of  prior right total hip arthroplasty. Updated son t12/19, family friend is trauma ortho at [facility], trying to obtain a bed for her. Discussed case with transfer center, no beds available to [place] or [facility]. For now will cont to await bed and [place]. Start hep sq, 5000 units, tid.      12/20- offered bed at [facility] last evening, famly declined, prefer to wait for [facility] due to the complicated nature of her fracture. Hr remaines elevated but acceptable 100-130s, soft BP, would like to transfuse but due to cold agglutinins per lab, we do not have the proper equipment. Cont cardizem po, trying to ween off cardizem drip, likely pain is exacerbating the afib rvr, morphine is being given, but she is also easily sedated, and has also now stopped eating, will switch ivf to dextrose 1/2 ns, and will offer NGT with tube feeds to the family.   I don't think I would have advised passing up the open bed at [facility] last night. Hopefully new bed at [place] becomes available soon. She is fighting off mouth care. Now hypernatremia from lack of intake, ivf adjusted.       DIET: Regular Diet      DISCHARGE MEDICATIONS:    Current Facility-Administered Medications:     dextrose 5 % - 0.45% NaCl infusion, 125 mL/hr, IntraVENous, CONTINUOUS, Marii Tracy MD    heparin (porcine) injection 5,000 Units, 5,000 Units, SubCUTAneous, Q8H, Marii Tracy MD, 5,000 Units at 12/20/22 0900    morphine injection 4 mg, 4 mg, IntraVENous, Q3H PRN, Marii Tracy MD, 4 mg at 12/20/22 0853    0.9% sodium chloride infusion 250 mL, 250 mL, IntraVENous, PRN, Alia MERIDA MD    dilTIAZem (CARDIZEM) 125 mg in 0.9% sodium chloride 125 mL ([id number]), 0-15 mg/hr, IntraVENous, TITRATE, Migue Monteiro NP, Last Rate: 7.5 mL/hr at 12/20/22 0701, 7.5 mg/hr at 12/20/22 0701    0.9% sodium chloride infusion 250 mL, 250 mL, IntraVENous, PRN, Mendez Quezada MD    oxyCODONE-acetaminophen (PERCOCET) 5-325 mg per tablet 1 Tablet, 1 Tablet, Oral, Q4H PRN, Marii Tracy MD, 1 Tablet at 12/17/22 2231    dilTIAZem ER (CARDIZEM CD) capsule 120 mg, 120 mg, Oral, DAILY, Marii Tracy MD, 120 mg at 89/28/94 4383    folic acid (FOLVITE) tablet 1 mg, 1 mg, Oral, DAILY, Isiah Lipscomb A, NP, 1 mg at 12/18/22 0801    ferrous sulfate tablet 325 mg, 325 mg, Oral, ACB, Isiah Lipscomb A, NP, 325 mg at 12/18/22 0636    donepeziL (ARICEPT) tablet 5 mg, 5 mg, Oral, QHS, Isiah Lipscomb A, NP, 5 mg at 12/17/22 2103    memantine (NAMENDA) tablet 10 mg, 10 mg, Oral, BID, Isiahjessica Lipscomb A, NP, 10 mg at 12/18/22 0801    sodium chloride (NS) flush 5-40 mL, 5-40 mL, IntraVENous, Q8H, Coral Mota NP, 10 mL at 12/20/22 0505    sodium chloride (NS) flush 5-40 mL, 5-40 mL, IntraVENous, PRN, Isiah GONGORA NP    acetaminophen (TYLENOL) tablet 650 mg, 650 mg, Oral, Q6H PRN **OR** acetaminophen (TYLENOL) suppository 650 mg, 650 mg, Rectal, Q6H PRN, Shaheed GONGORA NP    polyethylene glycol (MIRALAX) packet 17 g, 17 g, Oral, DAILY PRN, Shaheed GONGORA NP    ondansetron (ZOFRAN ODT) tablet 4 mg, 4 mg, Oral, Q8H PRN **OR** ondansetron (ZOFRAN) injection 4 mg, 4 mg, IntraVENous, Q6H PRN, Charolet Skiff, NP           DISPOSITION:acute care hospital      PATIENT CONDITION AT DISCHARGE:     Functional status   x Poor     Deconditioned     Independent        PHYSICAL EXAMINATION AT DISCHARGE:  General:          asleep, arousible and minamally interactive, cooperative, no distress, appears stated age. elderly white female  HEENT:           Atraumatic, anicteric sclerae, pink conjunctivae                          No oral ulcers, mucosa moist, throat clear, dentition fair  Neck:               Supple, symmetrical  Lungs:             Clear to auscultation bilaterally. No Wheezing or Rhonchi. No rales. Chest wall:      No tenderness  No Accessory muscle use. Heart:              irreg irreg, tachy 110  Abdomen:        Soft, non-tender. Not distended. Bowel sounds normal  Extremities:     tenderness to L hip  Skin:                Not pale. Not Jaundiced  No rashes   Psych:             Not anxious or agitated.   Neurologic:      \moves all extremities,      CHRONIC MEDICAL DIAGNOSES:  Problem List as of 12/20/2022 Never Reviewed            Codes Class Noted - Resolved    Femoral fracture ([facility] ICD-10-CM: S72.90XA  ICD-9-CM: 821.00  12/17/2022 - Present        Alzheimer's dementia ([facility] ICD-10-CM: G30.9, F02.80  ICD-9-CM: 331.0, 294.10  12/16/2022 - Present        * (Principal) Femur fracture ([facility] ICD-10-CM: S72.90XA  ICD-9-CM: 821.00  12/15/2022 - Present        Atrial fibrillation, chronic ([facility] ICD-10-CM: I48.20  ICD-9-CM: 427.31  12/15/2022 - Present           Greater than 35 minutes were spent with the patient on counseling and coordination of care    Signed:   Natalee Johnson MD  12/20/2022  2:46 PM

## 2022-12-20 NOTE — PROGRESS NOTES
Bedside report completed w/MD. New orders entered to increased IVF rate. Discussed nutritional status w/MD who states he will speak to family r/t NGT placement for nutritional purpose. Discussed multiple HR changes from 110's to 140's w/MD stating to leave Cardizem gtt at current rate w/no adjusting unless high rate is sustained. Will continue to monitor. CB in reach. Therapy with Vancomycin complete and / or consult / order discontinued by Dr Carlson on 1/7 @ 17:13   Pharmacy will sign off, please re-consult as needed.  Thank you for allowing us to participate in this patient's care.  Dario Sargent 1/7/2020 10:37 PM  Dept of Pharmacy  Ext [phone]

## 2022-12-20 NOTE — PROGRESS NOTES
Bedside shift change report given to YEISON Gordillo LPN (oncoming nurse) by Syl Chen. Dewane Schirmer, RN (offgoing nurse). Report included the following information SBAR, Kardex, Intake/Output, MAR, Recent Results, and Quality Measures.

## 2022-12-20 NOTE — PROGRESS NOTES
Resting quietly w/NAD. HR continues to fluctuate from 110-130. Cardizem gtt continues. Will continue to monitor. CB in reach.

## 2022-12-21 PROBLEM — I48.91 A-FIB (HCC): Status: ACTIVE | Noted: 2022-01-01

## 2022-12-21 NOTE — PROGRESS NOTES
[facility] Cardiology Progress Note      Patient Name: Akil Shelton  Gender: female  : 1931  Age:89 y.o. Patient seen and examined. This is a patient who is followed for afib with RVR remains on Dilt gtt - unable to tolerate PO medication. RN at bedside attempting to insert NGT. RN reports need for blood transfusion. Patient screaming from attempted placement of NGT.  outside of room. Skin appears jaundiced today. Dry oral mucosa with noted black hairy tongue. Patient continues to awaitinbed from outside facility for ortho surgical management. No other complaints reported. Telemetry reviewed, there were no events noted in the past 24 hours. Pertinent review of system items noted above, all other systems are negative. Current medications reviewed. Physical Examination    Skin: Pale, Appears jaundiced  HEENT: Dry oral mucosa; black hairy tongue  Cardiac: irregular rate and rhythm, no murmurs are appreciated. Lungs are diminished throughout - poor respiratory effort  Abdomen is soft, nontender, normal bowel sounds. Extremities: Right femur fracture -  traction in place    Labs reviewed: All lab results for the last 24 hours reviewed. Visit Vitals  BP (!) 121/56   Pulse (!) 114   Temp 98.3 °F (36.8 °C)   Resp 20   Ht 5' 7\" (1.702 m)   Wt 60.8 kg (134 lb)   SpO2 99%   BMI 20.99 kg/m²       Impression and Recommendations    Atrial fibrillation with RVR:  - on Cardizem IV - rate moderately controlled -  secondary to pain. previously on Coumadin - currently held in the presence of anemia (recent Hgb 6.8)requiring transfusion. Not currently tolerating any meds PO. Attempting to insert NGT. - echo EF 60-65% with enlarged RA/LA; mild to moderate MVR; moderate to severe TVR, elevated RVSP, LA and RA are severely dilated     Discussed plan of care with attending. Patient is awaiting OSH transfer for Ortho surgical management.   Please do no hesitate to call if additional questions arise.    HANH James  12/21/2022

## 2022-12-21 NOTE — PROGRESS NOTES
I have checked in with transfer twice thus far today, no bed at [facility], and in fact they were on diversion today. Requested for [facility]--no bed tonight, asked to try again in a.m. Requested again to try for other hospitals with orthopedic provider, transfer center will call me back. @[phone]: Received phone call from transfer center and spoke with orthopedic provider @[facility] who states he has reviewed the x-rays, and he will not be accepting patient as this is a complex fracture and will be a complex surgery and patient needs to be seen at a trauma facility-such as [facility] or [facility]. Per transfer center [facility] on diversion and many other hospitals are full. Have requested to speak with orthopedic provider at [facility], pending callback. @1169: received phone call again from [facility] a form comleted for Utilization review prior to transfer, will complete form-asap    @2583: received phone call again suggesting following up again with [facility] orthopedic provider in a.m, as they still have no beds.

## 2022-12-21 NOTE — PROGRESS NOTES
1845- Bedside shift change report given to Nafisa Ross, RN (oncoming nurse) by Aurora Torres LPN (offgoing nurse). Report included the following information SBAR, Kardex, ED Summary, Intake/Output, MAR, Accordion, Recent Results, and Cardiac Rhythm AFIB . 1955- Call received from patient sonBonifacio at this time. Primary nurse unavailable. Phone number recorded for return call. 2001- Primary RN returns patient son call at this time. Chip is requesting update on patient NGT status. This RN informs son attempts of NGT placement earlier today were unsuccessful. This RN to attempt x1 tonight as per provider. Chip requesting call from this RN after last attempt made for NGT placement.    @5592:

## 2022-12-21 NOTE — PROGRESS NOTES
0815: Called [facility] for update on transfer process. Per [facility] didn't received utilization packet. Re-faxed all information requested. 0930: Confirmed with [facility] that they received proper documents and pending review of case. Will all Transfer Center back after case reviewed.

## 2022-12-21 NOTE — PROGRESS NOTES
Problem: Pressure Injury - Risk of  Goal: *Prevention of pressure injury  Description: Document Harshad Scale and appropriate interventions in the flowsheet. Outcome: Progressing Towards Goal  Note: Pressure Injury Interventions:  Sensory Interventions: Assess changes in LOC    Moisture Interventions: Internal/External urinary devices    Activity Interventions: Pressure redistribution bed/mattress(bed type)    Mobility Interventions: Turn and reposition approx. every two hours(pillow and wedges)    Nutrition Interventions: Document food/fluid/supplement intake    Friction and Shear Interventions: HOB 30 degrees or less                Problem: Patient Education: Go to Patient Education Activity  Goal: Patient/Family Education  Outcome: Progressing Towards Goal     Problem: Falls - Risk of  Goal: *Absence of Falls  Description: Document Rene Fall Risk and appropriate interventions in the flowsheet.   Outcome: Progressing Towards Goal  Note: Fall Risk Interventions:       Mentation Interventions: Bed/chair exit alarm, Door open when patient unattended    Medication Interventions: Bed/chair exit alarm    Elimination Interventions: Bed/chair exit alarm    History of Falls Interventions: Bed/chair exit alarm, Door open when patient unattended         Problem: Patient Education: Go to Patient Education Activity  Goal: Patient/Family Education  Outcome: Progressing Towards Goal     Problem: Nutrition Deficit  Goal: *Optimize nutritional status  Outcome: Progressing Towards Goal     Problem: Patient Education: Go to Patient Education Activity  Goal: Patient/Family Education  Outcome: Progressing Towards Goal     Problem: Afib Pathway: Day 1  Goal: Off Pathway (Use only if patient is Off Pathway)  Outcome: Progressing Towards Goal  Goal: Activity/Safety  Outcome: Progressing Towards Goal  Goal: Consults, if ordered  Outcome: Progressing Towards Goal  Goal: Diagnostic Test/Procedures  Outcome: Progressing Towards Goal  Goal: Nutrition/Diet  Outcome: Progressing Towards Goal  Goal: Discharge Planning  Outcome: Progressing Towards Goal  Goal: Medications  Outcome: Progressing Towards Goal  Goal: Respiratory  Outcome: Progressing Towards Goal  Goal: Treatments/Interventions/Procedures  Outcome: Progressing Towards Goal  Goal: Psychosocial  Outcome: Progressing Towards Goal  Goal: *Optimal pain control at patient's stated goal  Outcome: Progressing Towards Goal  Goal: *Hemodynamically stable  Outcome: Progressing Towards Goal  Goal: *Stable cardiac rhythm  Outcome: Progressing Towards Goal  Goal: *Lungs clear or at baseline  Outcome: Progressing Towards Goal  Goal: *Labs within defined limits  Outcome: Progressing Towards Goal  Goal: *Describes available resources and support systems  Outcome: Progressing Towards Goal     Problem: Afib Pathway: Day 2  Goal: Off Pathway (Use only if patient is Off Pathway)  Outcome: Progressing Towards Goal  Goal: Activity/Safety  Outcome: Progressing Towards Goal  Goal: Consults, if ordered  Outcome: Progressing Towards Goal  Goal: Diagnostic Test/Procedures  Outcome: Progressing Towards Goal  Goal: Nutrition/Diet  Outcome: Progressing Towards Goal  Goal: Discharge Planning  Outcome: Progressing Towards Goal  Goal: Medications  Outcome: Progressing Towards Goal  Goal: Respiratory  Outcome: Progressing Towards Goal  Goal: Treatments/Interventions/Procedures  Outcome: Progressing Towards Goal  Goal: Psychosocial  Outcome: Progressing Towards Goal  Goal: *Hemodynamically stable  Outcome: Progressing Towards Goal  Goal: *Optimal pain control at patient's stated goal  Outcome: Progressing Towards Goal  Goal: *Stable cardiac rhythm  Outcome: Progressing Towards Goal  Goal: *Lungs clear or at baseline  Outcome: Progressing Towards Goal  Goal: *Describes available resources and support systems  Outcome: Progressing Towards Goal     Problem: Afib Pathway: Day 3  Goal: Off Pathway (Use only if patient is Off Pathway)  Outcome: Progressing Towards Goal  Goal: Activity/Safety  Outcome: Progressing Towards Goal  Goal: Diagnostic Test/Procedures  Outcome: Progressing Towards Goal  Goal: Nutrition/Diet  Outcome: Progressing Towards Goal  Goal: Discharge Planning  Outcome: Progressing Towards Goal  Goal: Medications  Outcome: Progressing Towards Goal  Goal: Respiratory  Outcome: Progressing Towards Goal  Goal: Treatments/Interventions/Procedures  Outcome: Progressing Towards Goal  Goal: Psychosocial  Outcome: Progressing Towards Goal

## 2022-12-21 NOTE — PROGRESS NOTES
Problem: Pressure Injury - Risk of  Goal: *Prevention of pressure injury  Description: Document Harshad Scale and appropriate interventions in the flowsheet. 12/21/2022 1026 by Gabrielle Tapia RN  Outcome: Progressing Towards Goal  Note: Pressure Injury Interventions:  Sensory Interventions: Assess changes in LOC    Moisture Interventions: Internal/External urinary devices    Activity Interventions: Pressure redistribution bed/mattress(bed type)    Mobility Interventions: Turn and reposition approx. every two hours(pillow and wedges)    Nutrition Interventions: Document food/fluid/supplement intake    Friction and Shear Interventions: HOB 30 degrees or less             12/21/2022 1025 by Gabrielle Tapia RN  Outcome: Progressing Towards Goal  Note: Pressure Injury Interventions:  Sensory Interventions: Assess changes in LOC    Moisture Interventions: Internal/External urinary devices    Activity Interventions: Pressure redistribution bed/mattress(bed type)    Mobility Interventions: Turn and reposition approx. every two hours(pillow and wedges)    Nutrition Interventions: Document food/fluid/supplement intake    Friction and Shear Interventions: HOB 30 degrees or less                Problem: Falls - Risk of  Goal: *Absence of Falls  Description: Document Rene Fall Risk and appropriate interventions in the flowsheet.   12/21/2022 1026 by Gabrielle Tapia RN  Outcome: Progressing Towards Goal  Note: Fall Risk Interventions:       Mentation Interventions: Bed/chair exit alarm, Door open when patient unattended    Medication Interventions: Bed/chair exit alarm    Elimination Interventions: Bed/chair exit alarm    History of Falls Interventions: Bed/chair exit alarm, Door open when patient unattended      12/21/2022 1025 by Gabrielle Tapia RN  Outcome: Progressing Towards Goal  Note: Fall Risk Interventions:       Mentation Interventions: Bed/chair exit alarm, Door open when patient unattended    Medication Interventions: Bed/chair exit alarm    Elimination Interventions: Bed/chair exit alarm    History of Falls Interventions: Bed/chair exit alarm, Door open when patient unattended         Problem: Nutrition Deficit  Goal: *Optimize nutritional status  12/21/2022 1026 by Sheree Wright RN  Outcome: Progressing Towards Goal  12/21/2022 1025 by Sheree Wright RN  Outcome: Progressing Towards Goal

## 2022-12-21 NOTE — PROGRESS NOTES
2105- Attempts made to drop NGT at this time, unsuccessful. 2135- Call to provider to update on unsuccessful NGT placement. States will most likely initiate TPN tomorrow. 2140- Call made to patient son at this time, updated on patient status. 2220- Patient moaning in pain, FLACC score reveals pain level of 7 at this time. VS checked at this time, PRN pain medication to be given. 0000- Reassessment completed at this time. No changes noted. 0215- Patient resting in bed, eyes closed. Respirations even and unlabored. Patient mumbling in sleep, incomprehensible speech. Patient poorly tolerates turns/repositioning. 0330- Morning labs drawn at this time. 65- Patient moans in pain, unable to be consoled at this time. PRN pain medication to be given.

## 2022-12-21 NOTE — DISCHARGE SUMMARY
Discharge Summary       PATIENT ID: Agus Perez  MRN: [id number]   YOB: 1931    DATE OF ADMISSION: 12/15/2022  6:33 PM    DATE OF DISCHARGE: 12/21/22    PRIMARY CARE PROVIDER: None     ATTENDING PHYSICIAN: Marvin Jensen MD  DISCHARGING PROVIDER: Marvin Jensen MD        CONSULTATIONS: IP CONSULT TO CARDIOLOGY  IP CONSULT TO CARDIOLOGY    PROCEDURES/SURGERIES: * No surgery found *    ADMITTING [facility] COURSE:      Agus Perez is a 80 y.o. female  has a past medical history of Blood disorder and Dementia ([address] Patient seen at bedside in emergency department. Patient's son is with patient and reports that patient just slid down the wall and moved in a way that her foot twisted and angulated her leg. Patient did not trip and fall it was suggested that she lost her footing. Patient's son is in room reports all of the history patient has significant Alzheimer's dementia with confusion. Patient is pleasant but at baseline per son. Patient stays at home with her . Patient is excepted at [facility] with the accepting orthopedics Dr. Rajiv Contreras and hospitalist Dr. Maria Ventura. There are no beds available at [facility] at this time patient's Coumadin will be held at this time she is on it for chronic atrial fibrillation, she will not be held n.p.o. at this time I will order regular diet.         DISCHARGE DIAGNOSES / PLAN:      Hospital course / Assessment    Principle Problems:  Femur fracture (HCC)  Patient has comminuted angulated distal right femoral fracture  She is accepted at [place] [address] to Dr. Rajiv Contreras orthopedics with Dr. Maria Ventura as Accepting hospitalist  Orthopedics not available this week in this facility, ED recommended admission waiting for transfer - she is at high risk of complication as admission INR is 4.1  Pain control  Hold Coumadin, INR 4.1 , s/p dose of Vit K -> 2.3  Traction if tolerated   Waiting on transfer  Acute Anemia secondary to acute blood loss from trauma / hematoma with elevated INR-   Hgb 9.3 ->7.8->7.1, 6.2 today, given her underlying uncontrolled Afib and expected surgery, will transfuse 1 unit of blood if obtained from [id number] monitor H/H and INR   Cont' with PO iron and folate   -- per blood bank, pt has sig cold agglutinins, and we do not have the equipment to be able to transfuse this pt  -- another good reason to transfer, currently hgb 6.8    Atrial fibrillation, chronic (HCC)  Tele, coumadin on hold pre op  Required a bolus of IV Cardizem for Rapid Afib-RVR  Cont' with PO cardizem  Cardiology consulted and following   trend INR - 4.1 ->2. 3     Alzheimer's dementia ([facility]  Continue Aricept, Namenda     Factor V leiden Def, Activated protein C resistance  - now that pt is < 2 inr, we had started sq hep, but hgb cont to drop  - blood bank warned of incresaed risk of clotting due to her blood disorders, we avoided transfusion at first, but now hgb is hanging around 6.3, and I suspect is worsening her Afib and weakness, since she is not actively bleeding as far as we can tell, I opt to start hep drip, without bolus due to hip injury, and proceed with transfusion on 1 unit pRBC as we cont to find placement for repair of her complex hip fx    Jaundice- suspect prehaptic cause of hematoma resorption  - noted on exam today, check ruq us, and repeat LFTs and total and direct bilirubin    Black Hairy Tongue  Black hairy tongue -- Black hairy tongue (lingua villosa nigra) is characterized by the elongated filiform papillae (due to a lack of adequate desquamation) and a yellowish to brown discoloration of the tongue surface (due to local and extrinsic factors) This benign condition is associated with smoking, antibiotic use, dehydration, Candida albicans infection, and poor oral hygiene. It is often seen in hospitalized patients who are on a gastrostomy tube (G-tube). The condition is generally asymptomatic.  Therapy consists of brushing or scraping that area of the tongue with a soft-bristle toothbrush or a specific tool (tongue ) two to three times per day   -- pt is fighting any oral care     Code Status: Partial - DNI but want compression, medications, and defibrillation, but does not want her to be intubated  DVT prophylaxis: pharmacologic and mechanical      Pt is accepted at [facility], not only do we not have ortho, but this fracture is comminuted and angulated distal right femoral diaphyseal. Evidence of  prior right total hip arthroplasty. Updated son t12/19, family friend is trauma ortho at [facility], trying to obtain a bed for her. Discussed case with transfer center, no beds available to [facility] or [facility]. For now will cont to await bed and [facility]. 12/20- offered bed at [place] [address] last evening, famly declined, prefer to wait for [facility] due to the complicated nature of her fracture. Hr remaines elevated but acceptable 100-130s, soft BP, would like to transfuse but due to cold agglutinins per lab, we do not have the proper equipment. Cont cardizem po, trying to ween off cardizem drip, likely pain is exacerbating the afib rvr, morphine is being given, but she is also easily sedated, and has also now stopped eating, will switch ivf to dextrose 1/2 ns, and will offer NGT with tube feeds to the family. I don't think I would have advised passing up the open bed at [place] [address] last night. Hopefully new bed at [facility] becomes available soon. She is fighting off mouth care. Now hypernatremia from lack of intake, ivf adjusted. 12/21- Pt is on list for [facility], and being reviewed by [facility], I have asked radiology to push images, I have spoken to both our transfer center and the transfer center at [facility], they were inquiring if anything needed to be done to stabilize this pt prior to surgery.   I explained that her afib w/ rvr is directly due to her hip fracture, and the pain resulting from it, outside of morphine which drops her BP there is nothing left for us to do besides operate. We will attempt to place NGT to start tube feedings for nutritional support.      DIET: Regular Diet      DISCHARGE MEDICATIONS:    Current Facility-Administered Medications:     heparin 25,000 units in D5W 250 ml infusion, 18-36 Units/kg/hr, IntraVENous, TITRATE, Blas Emery MD    dextrose 5 % - 0.45% NaCl infusion, 125 mL/hr, IntraVENous, CONTINUOUS, Blas Emery MD, Last Rate: 125 mL/hr at 12/21/22 0311, 125 mL/hr at 12/21/22 0311    morphine injection 4 mg, 4 mg, IntraVENous, Q3H PRN, Blas Emery MD, 4 mg at 12/21/22 0355    0.9% sodium chloride infusion 250 mL, 250 mL, IntraVENous, PRN, Gabino MERIDA MD    dilTIAZem (CARDIZEM) 125 mg in 0.9% sodium chloride 125 mL ([id number]), 0-15 mg/hr, IntraVENous, TITRATE, Maricel Monteiro NP, Last Rate: 7.5 mL/hr at 12/21/22 0723, 7.5 mg/hr at 12/21/22 0723    0.9% sodium chloride infusion 250 mL, 250 mL, IntraVENous, PRN, Katlin Fernandez MD    oxyCODONE-acetaminophen (PERCOCET) 5-325 mg per tablet 1 Tablet, 1 Tablet, Oral, Q4H PRN, Blas Emery MD, 1 Tablet at 12/17/22 2231    dilTIAZem ER (CARDIZEM CD) capsule 120 mg, 120 mg, Oral, DAILY, Blas Emery MD, 120 mg at 65/32/48 9334    folic acid (FOLVITE) tablet 1 mg, 1 mg, Oral, DAILY, Colton Bacca A, NP, 1 mg at 12/18/22 0801    ferrous sulfate tablet 325 mg, 325 mg, Oral, ACB, Colton Bacca A, NP, 325 mg at 12/18/22 0636    donepeziL (ARICEPT) tablet 5 mg, 5 mg, Oral, QHS, Coral Mota A, NP, 5 mg at 12/17/22 2103    memantine (NAMENDA) tablet 10 mg, 10 mg, Oral, BID, Colton Bacca A, NP, 10 mg at 12/18/22 0801    sodium chloride (NS) flush 5-40 mL, 5-40 mL, IntraVENous, Q8H, Coral Mota A, NP, 10 mL at 12/21/22 0550    sodium chloride (NS) flush 5-40 mL, 5-40 mL, IntraVENous, PRN, Colton Bacca A, NP, 10 mL at 12/20/22 1455    acetaminophen (TYLENOL) tablet 650 mg, 650 mg, Oral, Q6H PRN **OR** acetaminophen (TYLENOL) suppository 650 mg, 650 mg, Rectal, Q6H PRN, Kary GONGORA NP    polyethylene glycol (MIRALAX) packet 17 g, 17 g, Oral, DAILY PRN, Kary GONGORA NP    ondansetron (ZOFRAN ODT) tablet 4 mg, 4 mg, Oral, Q8H PRN **OR** ondansetron (ZOFRAN) injection 4 mg, 4 mg, IntraVENous, Q6H PRN, Ailyn Brown NP           DISPOSITION:acute care hospital      PATIENT CONDITION AT DISCHARGE:     Functional status   x Poor     Deconditioned     Independent        PHYSICAL EXAMINATION AT DISCHARGE:  General:          asleep, arousible and minamally interactive, no distress, appears stated age. elderly white female  HEENT:           Atraumatic, icteric sclerae, pink conjunctivae                          dry mucosa, black tongue  Neck:               Supple, symmetrical  Lungs:             Clear to auscultation bilaterally. No Wheezing or Rhonchi. No rales. Chest wall:      No tenderness  No Accessory muscle use. Heart:              irreg irreg, tachy 110  Abdomen:        Soft, non-tender. Not distended. Bowel sounds normal  Extremities:     tenderness to L hip  Skin:                Not pale. +Jaundiced  No rashes   Psych:             Not anxious or agitated.   Neurologic:      \moves all extremities,      CHRONIC MEDICAL DIAGNOSES:  Problem List as of 12/21/2022 Never Reviewed            Codes Class Noted - Resolved    Femoral fracture ([facility] ICD-10-CM: S72.90XA  ICD-9-CM: 821.00  12/17/2022 - Present        Alzheimer's dementia ([facility] ICD-10-CM: G30.9, F02.80  ICD-9-CM: 331.0, 294.10  12/16/2022 - Present        * (Principal) Femur fracture ([facility] ICD-10-CM: S72.90XA  ICD-9-CM: 821.00  12/15/2022 - Present        Atrial fibrillation, chronic ([facility] ICD-10-CM: I48.20  ICD-9-CM: 427.31  12/15/2022 - Present           Greater than 35 minutes were spent with the patient on counseling and coordination of care    Signed:   Ozzy Caro MD  12/21/2022  2:46 PM

## 2022-12-21 NOTE — PROGRESS NOTES
Problem: Pressure Injury - Risk of  Goal: *Prevention of pressure injury  Description: Document Harshad Scale and appropriate interventions in the flowsheet. Outcome: Progressing Towards Goal  Note: Pressure Injury Interventions:  Sensory Interventions: Keep linens dry and wrinkle-free, Minimize linen layers    Moisture Interventions: Absorbent underpads, Check for incontinence Q2 hours and as needed, Internal/External urinary devices    Activity Interventions: Assess need for specialty bed    Mobility Interventions: HOB 30 degrees or less    Nutrition Interventions: Document food/fluid/supplement intake, Discuss nutritional consult with provider, Offer support with meals,snacks and hydration    Friction and Shear Interventions: HOB 30 degrees or less, Lift sheet, Minimize layers                Problem: Patient Education: Go to Patient Education Activity  Goal: Patient/Family Education  Outcome: Progressing Towards Goal     Problem: Falls - Risk of  Goal: *Absence of Falls  Description: Document Rene Fall Risk and appropriate interventions in the flowsheet.   Outcome: Progressing Towards Goal  Note: Fall Risk Interventions:       Mentation Interventions: Bed/chair exit alarm, Door open when patient unattended    Medication Interventions: Bed/chair exit alarm    Elimination Interventions: Bed/chair exit alarm    History of Falls Interventions: Bed/chair exit alarm, Door open when patient unattended         Problem: Patient Education: Go to Patient Education Activity  Goal: Patient/Family Education  Outcome: Progressing Towards Goal     Problem: Nutrition Deficit  Goal: *Optimize nutritional status  Outcome: Progressing Towards Goal     Problem: Patient Education: Go to Patient Education Activity  Goal: Patient/Family Education  Outcome: Progressing Towards Goal     Problem: Afib Pathway: Day 1  Goal: Off Pathway (Use only if patient is Off Pathway)  Outcome: Progressing Towards Goal  Goal: Activity/Safety  Outcome: Progressing Towards Goal  Goal: Consults, if ordered  Outcome: Progressing Towards Goal  Goal: Diagnostic Test/Procedures  Outcome: Progressing Towards Goal  Goal: Nutrition/Diet  Outcome: Progressing Towards Goal  Goal: Discharge Planning  Outcome: Progressing Towards Goal  Goal: Medications  Outcome: Progressing Towards Goal  Goal: Respiratory  Outcome: Progressing Towards Goal  Goal: Treatments/Interventions/Procedures  Outcome: Progressing Towards Goal  Goal: Psychosocial  Outcome: Progressing Towards Goal  Goal: *Optimal pain control at patient's stated goal  Outcome: Progressing Towards Goal  Goal: *Hemodynamically stable  Outcome: Progressing Towards Goal  Goal: *Stable cardiac rhythm  Outcome: Progressing Towards Goal  Goal: *Lungs clear or at baseline  Outcome: Progressing Towards Goal  Goal: *Labs within defined limits  Outcome: Progressing Towards Goal  Goal: *Describes available resources and support systems  Outcome: Progressing Towards Goal     Problem: Afib Pathway: Day 2  Goal: Off Pathway (Use only if patient is Off Pathway)  Outcome: Progressing Towards Goal  Goal: Activity/Safety  Outcome: Progressing Towards Goal  Goal: Consults, if ordered  Outcome: Progressing Towards Goal  Goal: Diagnostic Test/Procedures  Outcome: Progressing Towards Goal  Goal: Nutrition/Diet  Outcome: Progressing Towards Goal  Goal: Discharge Planning  Outcome: Progressing Towards Goal  Goal: Medications  Outcome: Progressing Towards Goal  Goal: Respiratory  Outcome: Progressing Towards Goal  Goal: Treatments/Interventions/Procedures  Outcome: Progressing Towards Goal  Goal: Psychosocial  Outcome: Progressing Towards Goal  Goal: *Hemodynamically stable  Outcome: Progressing Towards Goal  Goal: *Optimal pain control at patient's stated goal  Outcome: Progressing Towards Goal  Goal: *Stable cardiac rhythm  Outcome: Progressing Towards Goal  Goal: *Lungs clear or at baseline  Outcome: Progressing Towards Goal  Goal: *Describes available resources and support systems  Outcome: Progressing Towards Goal     Problem: Afib Pathway: Day 3  Goal: Off Pathway (Use only if patient is Off Pathway)  Outcome: Progressing Towards Goal  Goal: Activity/Safety  Outcome: Progressing Towards Goal  Goal: Diagnostic Test/Procedures  Outcome: Progressing Towards Goal  Goal: Nutrition/Diet  Outcome: Progressing Towards Goal  Goal: Discharge Planning  Outcome: Progressing Towards Goal  Goal: Medications  Outcome: Progressing Towards Goal  Goal: Respiratory  Outcome: Progressing Towards Goal  Goal: Treatments/Interventions/Procedures  Outcome: Progressing Towards Goal  Goal: Psychosocial  Outcome: Progressing Towards Goal     Problem: Afib: Discharge Outcomes (not in CAT)  Goal: *Hemodynamically stable  Outcome: Progressing Towards Goal  Goal: *Stable cardiac rhythm  Outcome: Progressing Towards Goal  Goal: *Lungs clear or at baseline  Outcome: Progressing Towards Goal  Goal: *Optimal pain control at patient's stated goal  Outcome: Progressing Towards Goal  Goal: *Identifies cardiac risk factors  Outcome: Progressing Towards Goal  Goal: *Verbalizes home exercise program, activity guidelines, cardiac precautions  Outcome: Progressing Towards Goal  Goal: *Verbalizes understanding and describes prescribed diet  Outcome: Progressing Towards Goal  Goal: *Verbalizes understanding and describes medication purposes and frequencies  Outcome: Progressing Towards Goal  Goal: *Anxiety reduced or absent  Outcome: Progressing Towards Goal  Goal: *Understands and describes signs and symptoms to report to providers(Stroke Metric)  Outcome: Progressing Towards Goal  Goal: *Describes follow-up/return visits to physicians  Outcome: Progressing Towards Goal  Goal: *Describes available resources and support systems  Outcome: Progressing Towards Goal  Goal: *Influenza immunization  Outcome: Progressing Towards Goal  Goal: *Pneumococcal immunization  Outcome: Progressing Towards Goal  Goal: *Describes smoking cessation resources  Outcome: Progressing Towards Goal     Problem: Pain  Goal: *Control of Pain  Outcome: Progressing Towards Goal  Goal: *PALLIATIVE CARE:  Alleviation of Pain  Outcome: Progressing Towards Goal     Problem: Patient Education: Go to Patient Education Activity  Goal: Patient/Family Education  Outcome: Progressing Towards Goal

## 2022-12-21 NOTE — H&P
[address] [facility] Adult  Hospitalist Group  History and Physical    Date of Service:  12/21/2022  Primary Care Provider: None  Source of information: The patient and Chart review    Chief Complaint: No chief complaint on file. History of Presenting Illness:   Amina Crenshaw is a 80 y.o. female who initially presented to [facility] [place] after mechanical fall. Per report, patient's son states patient just slid down the wall and her foot twisted and angulated her leg. Patient with known history of Alzheimer's dementia and most history cannot be obtained. Work-up shows patient with comminuted and angulated distal right femoral diaphyseal fracture. Per signout from hospitalist, patient has been boarded at [facility] [place] for a week as there is no orthopedic available during this week and having difficulty transferring to other facilities for orthopedic evaluation. Patient was finally accepted by orthopedic at [facility] and therefore transferred here for further evaluation. Patient also noted to have hematoma around her fracture femur and noted low hemoglobin requiring transfusion of total of 2 units PRBCs, last unit was transfused today. Patient does have iron deficiency at baseline. Patient also was noted to be jaundiced, per hospitalist their impression was probable resumption from hematoma causing jaundice. No liver function tests were available at this time. Patient also with known history of atrial fibrillation and noted to have RVR for which patient was started on IV Cardizem and continuing on that. Patient chronically takes Coumadin for her A. fib as well as factor V Ley[place]. Patient is currently heparin drip on that. REVIEW OF SYSTEMS:  Review of system cannot be obtained due to patient's baseline dementia. Past Medical History:   Diagnosis Date    Blood disorder     Dementia ([address]       No past surgical history on file.   Prior to Admission medications    Medication Sig Start Date End Date Taking? Authorizing Provider   warfarin (COUMADIN) 10 mg tablet Take 10 mg by mouth. MWF    Oliver Cotton MD   warfarin (COUMADIN) 7.5 mg tablet Take 7.5 mg by mouth. TRSaSu    Oliver Cotton MD   memantine (NAMENDA) 10 mg tablet Take  by mouth two (2) times a day. Oliver Cotton MD   donepeziL (ARICEPT) 5 mg tablet Take  by mouth nightly. Oliver Cotton MD   dilTIAZem ER [facility]) 120 mg capsule Take 120 mg by mouth daily. Oliver Cotton MD   folic acid 793 mcg tablet Take 400 mcg by mouth daily. Oliver Cotton MD   ferrous gluconate 324 mg (38 mg iron) tablet Take  by mouth Daily (before breakfast). Oliver Cotton MD     Allergies   Allergen Reactions    Penicillins Anaphylaxis      No family history on file. Social History:     Social Determinants of Health     Tobacco Use: Not on file   Alcohol Use: Not on file   Financial Resource Strain: Not on file   Food Insecurity: Not on file   Transportation Needs: Not on file   Physical Activity: Not on file   Stress: Not on file   Social Connections: Not on file   Intimate Partner Violence: Not on file   Depression: Not on file   Housing Stability: Not on file        Family and social history were personally reviewed, all pertinent and relevant details are outlined as above.     Objective:   Visit Vitals  /61 (BP 1 Location: Left upper arm, BP Patient Position: At rest)   Pulse (!) 115   Temp 98.3 °F (36.8 °C)   Resp 21   SpO2 92%    O2 Flow Rate (L/min): 6 l/min O2 Device: Nasal cannula    PHYSICAL EXAM:   General: No acute distress, resting in bed, pleasant female, appears to be stated age  HEENT: PEERL, EOMI, moist mucus membranes  Neck: Supple, no JVD, no meningeal signs  Chest: Clear to auscultation bilaterally   CVS: RRR, S1 S2 heard, no murmurs/rubs/gallops  Abd: Soft, non-tender, non-distended, +bowel sounds   Ext: No clubbing, no cyanosis, no edema  Neuro/Psych: No acute deficit  Cap refill: Brisk, less than 3 seconds  Pulses: 2+, symmetric in all extremities  Skin: Warm, dry, without rashes or lesions    Data Review: All diagnostic labs and studies have been reviewed. Abnormal Labs Reviewed - No data to display    All Micro Results       None            IMAGING:   No orders to display        ECG/ECHO:    Results for orders placed or performed during the hospital encounter of 12/15/22   EKG, 12 LEAD, INITIAL   Result Value Ref Range    Ventricular Rate 68 BPM    Atrial Rate 125 BPM    P-R Interval 57 ms    QRS Duration 82 ms    Q-T Interval 408 ms    QTC Calculation (Bezet) 434 ms    Calculated R Axis 80 degrees    Calculated T Axis 68 degrees    Diagnosis       Atrial fibrillation  Nonspecific T abnrm, anterolateral leads  Abnormal ECG      Confirmed by Evelin Gupta ([id number]) on 12/16/2022 10:18:00 AM          Assessment:   Given the patient's current clinical presentation, there is a high level of concern for decompensation if discharged from the emergency department. Complex decision making was performed, which includes reviewing the patient's available past medical records, laboratory results, and imaging studies. Active Problems:    A-fib [facility]) (12/21/2022)        Plan:     Right distal femoral fracture  -Patient with femoral fracture due to trauma, x-ray showing comminuted angulated distal right femur fracture  -Hospitalist at [facility] has consulted orthopedic team who accepted patient for transfer for evaluation  -Orthopedic consult, keep n.p.o., pain management, fall precautions    Atrial fibrillation with RVR  -Patient with known history of atrial fibrillation, noted RVR requiring Cardizem drip prior to this transfer  -Continue Cardizem drip, resume p.o.  Cardizem, wean drip as tolerated  -Patient on chronic anticoagulation with Coumadin, currently on heparin drip  -Cardiology consult for further evaluation    Acute anemia  -Presumed likely secondary to hematoma from acute fracture site  -Patient is s/p transfusion of 2 units PRBCs, monitor H&H  -Transfuse as needed if hemoglobin less than 7    Jaundice  -Presumed resumption of hematoma causing jaundice  -Check LFTs in a.m., monitor    Active 5 Jose  -Patient on anticoagulation with Coumadin at home  -Currently on heparin drip given likely surgery for her femur fracture as well as given acute anemia, holding Coumadin for now    Alzheimer's dementia  -Continue Aricept and Namenda  -Supportive care    Estimated length of stay is 2 midnights. DIET: No diet orders on file   ISOLATION PRECAUTIONS: There are currently no Active Isolations  CODE STATUS: Prior   DVT PROPHYLAXIS: Heparin  FUNCTIONAL STATUS PRIOR TO HOSPITALIZATION: Capable of only limited self-care; confined to bed or chair likely more than 50% of waking hours. Ambulatory status/function: By self   EARLY MOBILITY ASSESSMENT: Recommend an assessment from physical therapy and/or occupational therapy  ANTICIPATED DISCHARGE: 24-48 hours. ANTICIPATED DISPOSITION: Home  EMERGENCY CONTACT/SURROGATE DECISION MAKER:     CRITICAL CARE WAS PERFORMED FOR THIS ENCOUNTER: YES. I had a face to face encounter with the patient, reviewed and interpreted patient data including clinical events, labs, images, vital signs, I/O's, and examined patient. I have discussed the case and the plan and management of the patient's care with the consulting services, the bedside nurses and necessary ancillary providers. This patient has a high probability of imminent, clinically significant deterioration, which requires the highest level of preparedness to intervene urgently. I participated in the decision-making and personally managed or directed the management of the following life and organ supporting interventions that required my frequent assessment to treat or prevent imminent deterioration. I personally spent 60 minutes of critical care time.   This is time spent at this critically ill patient's bedside actively involved in patient care as well as the coordination of care and discussions with the patient's family. This does not include any procedural time which has been billed separately. Signed By: Genoveva Shane MD     December 21, 2022         Please note that this dictation may have been completed with Dragon, the computer voice recognition software. Quite often unanticipated grammatical, syntax, homophones, and other interpretive errors are inadvertently transcribed by the computer software. Please disregard these errors. Please excuse any errors that have escaped final proofreading.

## 2022-12-21 NOTE — PROGRESS NOTES
1335: TRANSFER - IN REPORT:    Verbal report received from [place] (name) on [facility]  being received from [facility] (unit) for routine progression of care      Report consisted of patients Situation, Background, Assessment and   Recommendations(SBAR). Information from the following report(s) SBAR, MAR, and Recent Results was reviewed with the receiving nurse. Opportunity for questions and clarification was provided. 1715: Assessment completed upon patients arrival to unit and care assumed. Redness and swelling to left upper arm; redness to bilateral elbows; redness to sacrum; redness to left heel. Scattered bruising noted throughout body. Probable deep tissue on sacrum. 1930: Bedside shift change report given to Lenora Wilcox RN (oncoming nurse) by Jovan Boogie RN (offgoing nurse). Report included the following information SBAR, MAR, and Recent Results.

## 2022-12-21 NOTE — PROGRESS NOTES
Report given to receiving facility as well as [facility] transport. Paperwork completed. Pt given 4mg morphine IV upon transfer. Pt tolerated transfer from bed to stretcher. Family at bedside.

## 2022-12-22 NOTE — PROGRESS NOTES
Transitions of Care Plan  RUR: 15% - moderate  Clinical Update: IV amio; dementia; CT; comminuted rt femoral fracture - not surgical candidate given acute anemia  Consults: Multiple  Baseline: dementia; resides w spouse who also has some cognition difficulties; son resides near patient  Barriers to Discharge: medical  Disposition: poor prognosis - will continue to follow for needs  Estimated Discharge Date: 2+ days    Reason for Admission:   OS Transfer for Orthopedic Service                  RUR Score:     15% - moderate             PCP: First and Last name:   None     Name of Practice:    Are you a current patient: Yes/No:    Approximate date of last visit:    Can you participate in a virtual visit if needed:     Do you (patient/family) have any concerns for transition/discharge? Concerns related to patient's high risk of decline              Plan for utilizing home health: Anticipate patient will need rehab if she clinically progresses and is able to have surgery    Current Advanced Directive/Advance Care Plan:  DNR      Healthcare Decision Maker:   Click here to complete [address] including selection of the Healthcare Decision Maker Relationship (ie \"Primary\")            Primary Decision Maker (Active): Jennetta Bloch - [phone]    Transition of Care Plan:          Patient transferred from [facility]) for orthopedic service. Patient also with acute anemia, dementia, unable to take food PO, and very sick. Poor prognosis at this time. Patient's son met w Palliative and decided on aggressive measures at this time. Patient not currently stable for orthopedic surgery for her fracture. CM will continue follow for disposition needs. Alex Layne, MPH  Care Manager [facility]  Available via [address] [place] [address] or      Care Management Interventions  PCP Verified by CM: Yes  Palliative Care Criteria Met (RRAT>21 & CHF Dx)?: No  Mode of Transport at Discharge:  Other (see comment)  Transition of Care Consult (CM Consult): Discharge Planning  MyChart Signup: No  Discharge Durable Medical Equipment: No  Health Maintenance Reviewed: Yes  Physical Therapy Consult: No  Occupational Therapy Consult: No  Speech Therapy Consult: Yes  Support Systems: Spouse/Significant Other, Child(luisa)  Confirm Follow Up Transport: Family  Discharge Location  Patient Expects to be Discharged to[de-identified] Unable to determine at this time

## 2022-12-22 NOTE — PROGRESS NOTES
1615: Warmed blankets placed on patient per Dr. Robe Ventura, will maintain warmed blankets throughout transfusions; patient currently receiving transfusion of warmed blood    2004: Second unit of PRBC started utilizing blood warmer; forced air warming blanket placed on patient per provider order, set at [address]; will monitor patient's temperature and decrease to 32C if needed    2100: Patient incontinent of stool, incontinence care provided; new sacral mepilex placed along with mepilex to bony prominences on upper back; temperature >100 at this time, warming blanket removed; continuous temperature monitoring in place, will reevaluate warming blanket when patient is normothermic    2200: Patient has order for Stat CT; patient currently receiving transfusion via blood warmer which can't be disconnected for transport; patient also in traction which can't be maintained while transferred to CT table for scan; will contact hospitalist for guidance    2220: Per Dr. Geraldo Bentley, ok to wait until after transfusion for CT; Dr. Geraldo Bentley will defer to ortho for guidance on traction; primary RN Mike Pittman will contact Ortho Sx on call

## 2022-12-22 NOTE — CONSULTS
[address] [facility] Cardiology Consultation    Date of Consult:  12/22/22  Date of Admission: 12/21/2022  Primary Cardiologist: Dr. Mildred Gibson  Physician Requesting consult: Dr. Nesha Mcrae    Assessment/Recommendations:  Atrial fibrillation with RVR   - agree with IV amiodarone infusion. Blood transfusion should help with rate control as well  - if rates remain uncontrolled despite above, recommend digoxin IV bolus 250 mcg. Will need close monitoring of digoxin levels due to interaction with amiodarone  - anticoagulation on hold to anemia/bleeding    Acute on chronic HFpEF, moderate to severe TR  - will need close monitoring of respiratory status with volume resuscitation    Acute blood loss anemia  - RBC transfusion has been ordered    Comminuted right femoral fracture c/b hematoma  - patient is currently very high risk of perioperative cardiovascular complications and needs optimization of her AF, anemia prior to considering surgery    Right popliteal DVT, h/o Factor V Leiden mutation  - anticoagulation is on hold due to anemia    Alzheimer's dementia    I discussed the overall poor prognosis with her son Eddie and current prohibitive risk for surgery. Thank you for the opportunity to participate in the care of Vishal Marin and please do not hesitate to contact us should you have any questions. Chief Complaint / Reason for Consult:   AF    History of Present Illness:  Vishal Marin is a 80 y.o. female with the below listed medical history who was admitted with to outside hospital and transferred to [facility] yesterday for management of her femur fracture. Unable to obtain history from patient due to her encephalopathy. She was admitted to OSH after mechanical fall which led to right femur fracture. She was awaiting transfer from there for orthopedic surgery consultation. Noted be in AF with RVR there and was on IV diltiazem. Hospital course c/b anemia as well.     Discussed with RN, pt is currently awaiting placement of central venous access. Unable to obtain PIVs.     Unable to review below due to her encephalopathy    Past Medical History:   Diagnosis Date    Blood disorder     Dementia ([facility] [address]        Prior to Admission medications    Medication Sig Start Date End Date Taking? Authorizing Provider   warfarin (COUMADIN) 10 mg tablet Take 10 mg by mouth. MWF    Oliver Cotton MD   warfarin (COUMADIN) 7.5 mg tablet Take 7.5 mg by mouth. TRSaSu    Oliver Cotton MD   memantine (NAMENDA) 10 mg tablet Take  by mouth two (2) times a day. Oliver Cotton MD   donepeziL (ARICEPT) 5 mg tablet Take  by mouth nightly. Oliver Cotton MD   dilTIAZem ER [facility]) 120 mg capsule Take 120 mg by mouth daily. Oliver Cotton MD   folic acid 510 mcg tablet Take 400 mcg by mouth daily. Oliver Cotton MD   ferrous gluconate 324 mg (38 mg iron) tablet Take  by mouth Daily (before breakfast). Oliver Cotton MD       Current Facility-Administered Medications   Medication Dose Route Frequency    [Held by provider] 0.9% sodium chloride infusion 250 mL  250 mL IntraVENous PRN    amiodarone (CORDARONE) 375 mg/250 mL D5W infusion  0.5-1 mg/min IntraVENous TITRATE    morphine injection 1 mg  1 mg IntraVENous Q3H PRN    [Held by provider] dilTIAZem ER (CARDIZEM CD) capsule 120 mg  120 mg Oral DAILY    [Held by provider] donepeziL (ARICEPT) tablet 5 mg  5 mg Oral QHS    [Held by provider] memantine (NAMENDA) tablet 5 mg  5 mg Oral BID    dextrose 5 % - 0.45% NaCl infusion  125 mL/hr IntraVENous CONTINUOUS    dilTIAZem (CARDIZEM) 125 mg in dextrose 5% 125 mL infusion  0-15 mg/hr IntraVENous TITRATE    [Held by provider] heparin 25,000 units in D5W 250 ml infusion  12-25 Units/kg/hr IntraVENous TITRATE       History reviewed. No pertinent family history.   No family h/o SCD or premature CAD    Social History     Socioeconomic History    Marital status:      Spouse name: Not on file    Number of children: Not on file    Years of education: Not on file    Highest education level: Not on file   Occupational History    Not on file   Tobacco Use    Smoking status: Not on file    Smokeless tobacco: Not on file   Substance and Sexual Activity    Alcohol use: Not on file    Drug use: Not on file    Sexual activity: Not on file   Other Topics Concern    Not on file   Social History Narrative    Not on file     Social Determinants of Health     Financial Resource Strain: Not on file   Food Insecurity: Not on file   Transportation Needs: Not on file   Physical Activity: Not on file   Stress: Not on file   Social Connections: Not on file   Intimate Partner Violence: Not on file   Housing Stability: Not on file       ROS  ROS: All other systems reviewed and were negative other than mentioned above. Visit Vitals  BP (!) 106/46   Pulse (!) 127   Temp 98 °F (36.7 °C)   Resp 25   SpO2 99%         Intake/Output Summary (Last 24 hours) at 12/22/2022 1021  Last data filed at 12/21/2022 1716  Gross per 24 hour   Intake 0 ml   Output 100 ml   Net -100 ml        General: elderly and frail.  In distress  HEENT: extremely dry mucous membranes  Neck: supple,   CV: tachycardic, irregular, ELSI  Lungs: coarse BS anteriorly  Abdomen: soft, non distended  MSK: RLE in traction with edema, mild LLE edema  Skin: warm to touch  Neuro: alert, in distress     Lab Review:  BMP:   Lab Results   Component Value Date/Time     (H) 12/22/2022 01:44 AM    K 3.5 12/22/2022 01:44 AM     (H) 12/22/2022 01:44 AM    CO2 28 12/22/2022 01:44 AM    AGAP 2 (L) 12/22/2022 01:44 AM     (H) 12/22/2022 01:44 AM    BUN 22 (H) 12/22/2022 01:44 AM    CREA 0.60 12/22/2022 01:44 AM        CBC:  Lab Results   Component Value Date/Time    WBC 6.8 12/22/2022 01:44 AM    HGB 5.3 (LL) 12/22/2022 01:44 AM    HCT 16.0 (LL) 12/22/2022 01:44 AM    PLATELET 151 (L) 66/58/7969 01:44 AM    MCV 99.4 (H) 12/22/2022 01:44 AM       All Cardiac Markers    Latest Reference Range & Units 12/21/22 03:40   NT pro-BNP 0 - 1,800 pg/mL 4,675 (H)   (H): Data is abnormally high    Data Review:  ECG tracing personally reviewed: AF, NSST changes, AF with RVR, rate 140s on tele    Echocardiogram: reviewed personally and agree with below  12/15/22    ECHO ADULT COMPLETE 12/19/2022 12/19/2022    Interpretation Summary    Left Ventricle: Normal left ventricular systolic function with a visually estimated EF of 60 - 65%. Left ventricle size is normal. Mildly increased wall thickness. LVPWd is 1.1 cm. Normal wall motion. Aortic Valve: Tricuspid valve. Thickened cusp. Moderately calcified cusp. Mitral Valve: Moderately thickened leaflet, at the anterior leaflet. Mild to moderate regurgitation. Trace stenosis noted. MV mean gradient is 3 mmHg. MV area by continuity equation is 3.1 cm2. Tricuspid Valve: Moderate to severe regurgitation. Mildly elevated RVSP. The estimated RVSP is 48 mmHg. Left Atrium: Left atrium is severely dilated. Left atrial volume index is severely increased (>48 mL/m2). Right Atrium: Right atrium is moderately dilated. Pericardium: Possible small pleural effusion. Signed by: Karla Matthews MD on 12/19/2022  4:52 PM      Other imaging: CXR with pulmonary edema and bilateral pleural effusions. Signed:  Dilma Escobar.  Montrell Garay, [facility]  12/22/22

## 2022-12-22 NOTE — CONSULTS
Palliative Medicine Consult  Joseph: [phone] ([phone])    Patient Name: Jose Altman  YOB: 1931    Date of Initial Consult: December 22, 2022  Reason for Consult: Care Decisions  Requesting Provider: Dr. Petros White  Primary Care Physician: None     SUMMARY:   Jose Altman is a 80 y.o. female admitted on 12/21/2022 from [facility] with a diagnosis of distal rt femoral fracture. Pt injured rt thigh after loosing footing. She was admitted to a hospital closer to her home but they did not have any ortho services. She was transferred to us after about one week of the initial injury. Other issues afib, acute anemia, jaundice, intractable pain. PMH:dementia, factor V Leiden on coumadin, afib, previous hip fracture    Current medical issues leading to Palliative Medicine involvement include: pt acutely ill with high risk of morbidity. She is not a candidate for surgery with critical anemia and hypotension. We have been asked to support with care decisions. .    Social: pt lives at home with spouse who reportedly is ill with some cognitive impairment  Son is the only child~son [facility] - [phone])     PALLIATIVE DIAGNOSES:   Shortness of breath  Hypotension  Delirium  Rt femur fracture  Palliative Care Encounter      PLAN:   Pt seen with son at the bedside. Services explained  Inquired about his understanding of the patient's condition and wishes at this time  We discussed 2 paths of care . Explained that the path that she is on now is with the intention to prolong her life. Explained that despite best efforts her risk of dying remains high. Explained that with this path , we cannot optimize her pain due to her hypotension and she will suffering end of life. Explained that we are trying to obtain a safe access to administer blood . She would die if any surgical intervention attempted and this would not guarantee her relief.     The other path would include a focus on pain and suffering without blood, diagnostics, labs, or testing. She would die peacefully. Son says he is not ready for hospice at this time . He would like to at least give her one more chance and maybe miraculously she will improve enough to have surgery  Pt was clear of her wish to not resuscitate and stated that he understands that she could die either way but he has to try something. Supported son of his wish to continue aggressive efforts. Will continue to follow. Son lives on the [facility] and appreciates all the communication and efforts given his inability to be at the bedside more. Initial consult note routed to primary continuity provider and/or primary health care team members  Communicated plan of care with: Palliative IDT, [facility] Team     GOALS OF CARE / TREATMENT PREFERENCES:     GOALS OF CARE:  Patient/Health Care Proxy Stated Goals: Other (comment) (family not ready for comfort. continue current efforts)    TREATMENT PREFERENCES:   Code Status: DNR    Patient and family's personal goals include: continue current care     Advance Care Planning:  [x] The [facility] Interdisciplinary Team has updated the ACP Navigator with [address] and Patient Capacity      Primary Decision Maker (Active): Charlessarah Tawnya - [phone]    Advance Care Planning 12/15/2022   Confirm Advance Directive Yes, on file   Does the patient have other document types Power of        Medical Interventions: Limited additional interventions       Other:    As far as possible, the palliative care team has discussed with patient / health care proxy about goals of care / treatment preferences for patient.      HISTORY:     History obtained from: chart, team, family    CHIEF COMPLAINT: Pt admitted with aforementioned history and issues    HPI/SUBJECTIVE:    The patient is:   [] Verbal and participatory  [x] Non-participatory due to: medical condition        Clinical Pain Assessment (nonverbal scale for severity on nonverbal patients):   Clinical Pain Assessment  Severity: 8  Location: generalized  Character: UTO  Duration: UTO  Effect: UTO  Factors: UTO  Frequency: constant     Activity (Movement): Seeking attention through movement or slow, cautious movement    Duration: for how long has pt been experiencing pain (e.g., 2 days, 1 month, years)  Frequency: how often pain is an issue (e.g., several times per day, once every few days, constant)     FUNCTIONAL ASSESSMENT:     Palliative Performance Scale (PPS):  PPS: 20       PSYCHOSOCIAL/SPIRITUAL SCREENING:     Palliative IDT has assessed this patient for cultural preferences / practices and a referral made as appropriate to needs (Cultural Services, Patient Advocacy, Ethics, etc.)    Any spiritual / Orthodox concerns:  [] Yes /  [x] No  [] Unable to obtain this information  If \"Yes\" to discuss with pastoral care during IDT     Does caregiver feel burdened by caring for their loved one:   [] Yes /  [x] No /  [] No Caregiver Present/Available [] No Caregiver [] Pt Lives at Facility  If \"Yes\" to discuss with social work during IDT    Anticipatory grief assessment:   [x] Normal  / [] Maladaptive   [] Unable to obtain this information  [] n/a  If \"Maladaptive\" to discuss with social work during IDT    ESAS Anxiety: Anxiety: 8    ESAS Depression:          REVIEW OF SYSTEMS:     Positive and pertinent negative findings in ROS are noted above in HPI. The following systems were [] reviewed / [x] unable to be reviewed as noted in HPI  Other findings are noted below. Systems: constitutional, ears/nose/mouth/throat, respiratory, gastrointestinal, genitourinary, musculoskeletal, integumentary, neurologic, psychiatric, endocrine. Positive findings noted below.   Modified ESAS Completed by: provider   Fatigue: 10 Drowsiness: 10     Pain: 8   Anxiety: 8       Dyspnea: 3                    PHYSICAL EXAM:     From RN flowsheet:  Wt Readings from Last 3 Encounters:   12/19/22 134 lb (60.8 kg)     Blood pressure (!) 93/46, pulse (!) 129, temperature 98.5 °F (36.9 °C), resp. rate 27, SpO2 99 %. Pain Scale 1: Adult Nonverbal Pain Scale                    Last bowel movement, if known:     Constitutional: pt writhing in pain  Eyes:   ENMT: no nasal discharge, moist mucous membranes  Cardiovascular:   Respiratory: breathing labored, symmetric  Gastrointestinal:   Musculoskeletal: rt femur fracture  Skin: warm, dry  Neurologic: confused  Psychiatric: anxious, restless  Other:       HISTORY:     Active Problems:    [facility] (12/21/2022)    Past Medical History:   Diagnosis Date    Blood disorder     Dementia ([facility]       History reviewed. No pertinent surgical history. History reviewed. No pertinent family history. History reviewed, no pertinent family history.   Social History     Tobacco Use    Smoking status: Not on file    Smokeless tobacco: Not on file   Substance Use Topics    Alcohol use: Not on file     Allergies   Allergen Reactions    Penicillins Anaphylaxis      Current Facility-Administered Medications   Medication Dose Route Frequency    [Held by provider] 0.9% sodium chloride infusion 250 mL  250 mL IntraVENous PRN    amiodarone (CORDARONE) 375 mg/250 mL D5W infusion  0.5-1 mg/min IntraVENous TITRATE    morphine injection 1 mg  1 mg IntraVENous Q3H PRN    sodium chloride 0.9 % bolus infusion 500 mL  500 mL IntraVENous ONCE    lactated ringers bolus infusion 1,000 mL  1,000 mL IntraVENous ONCE    dextrose 5% infusion  50 mL/hr IntraVENous CONTINUOUS    [Held by provider] dilTIAZem ER (CARDIZEM CD) capsule 120 mg  120 mg Oral DAILY    [Held by provider] donepeziL (ARICEPT) tablet 5 mg  5 mg Oral QHS    [Held by provider] memantine (NAMENDA) tablet 5 mg  5 mg Oral BID    dextrose 5 % - 0.45% NaCl infusion  125 mL/hr IntraVENous CONTINUOUS    dilTIAZem (CARDIZEM) 125 mg in dextrose 5% 125 mL infusion  0-15 mg/hr IntraVENous TITRATE    [Held by provider] heparin 25,000 units in D5W 250 ml infusion  12-25 Units/kg/hr IntraVENous TITRATE          LAB AND IMAGING FINDINGS:     Lab Results   Component Value Date/Time    WBC 6.8 12/22/2022 01:44 AM    HGB 5.3 (LL) 12/22/2022 01:44 AM    PLATELET 817 (L) 03/01/4764 01:44 AM     Lab Results   Component Value Date/Time    Sodium 146 (H) 12/22/2022 01:44 AM    Potassium 3.5 12/22/2022 01:44 AM    Chloride 116 (H) 12/22/2022 01:44 AM    CO2 28 12/22/2022 01:44 AM    BUN 22 (H) 12/22/2022 01:44 AM    Creatinine 0.60 12/22/2022 01:44 AM    Calcium 9.0 12/22/2022 01:44 AM    Magnesium 2.2 12/20/2022 03:10 AM    Phosphorus 2.2 (L) 12/20/2022 03:10 AM      Lab Results   Component Value Date/Time    Alk. phosphatase 76 12/22/2022 01:44 AM    Protein, total 5.5 (L) 12/22/2022 01:44 AM    Albumin 2.4 (L) 12/22/2022 01:44 AM    Globulin 3.1 12/22/2022 01:44 AM     Lab Results   Component Value Date/Time    INR 1.8 (H) 12/19/2022 04:00 AM    Prothrombin time 21.3 (H) 12/19/2022 04:00 AM    aPTT 26.7 12/21/2022 10:05 AM      No results found for: IRON, FE, TIBC, IBCT, PSAT, FERR   No results found for: PH, PCO2, PO2  No components found for: GLPOC   No results found for: CPK, CKMB             Total time: 70  minutes  Counseling / coordination time, spent as noted above:  60 minutes  > 50% counseling / coordination?: yes    Prolonged service was provided for  []30 min   []75 min in face to face time in the presence of the patient, spent as noted above. Time Start:   Time End:   Note: this can only be billed with 96478 (initial) or [id number] (follow up). If multiple start / stop times, list each separately.

## 2022-12-22 NOTE — PROGRESS NOTES
0730: Bedside and Verbal shift change report given to SARATH Kaufman (oncoming nurse) by Sandra Kerns RN (offgoing nurse). Report included the following information SBAR, Kardex, Intake/Output, MAR, Cardiac Rhythm A-FIB, and Quality Measures. 1542: blood transfusion started using blood warmer.

## 2022-12-22 NOTE — PROGRESS NOTES
Rapid Response [facility], RRT responding    Rapid called for central line placement. This RN obtained a 22 gauge IV in patient's right bicep 1 hour prior. 2 more IV attempts made, both unsuccessful. Anesthesiologist at bedside to place central line at this time. ICU MD at bedside to evaluate. RRT will continue to follow.

## 2022-12-22 NOTE — PROCEDURES
Central Line Placement    Start time: 12/22/2022 10:00 AM  End time: 12/22/2022 10:20 AM  Performed by: Racheal Ferrera MD  Authorized by: Racheal Ferrera MD     Timeout Time: 10:00 EST       Pre-procedure: All elements of maximal sterile barrier technique followed?  Yes    2% Chlorhexidine for cutaneous antisepsis and Hand hygiene performed prior to catheter insertion              Procedure:   Prep:  Chlorhexidine    Orientation:  Right  Patient position:  Trendelenburg  Catheter type:  Triple lumen  Catheter size:  7 Fr  Catheter length:  16 cm  Number of attempts:  1  Successful placement: Yes      Assessment:   Post-procedure:  Catheter secured and sterile dressing with CHG applied  Assessment:  Guidewire removal verified, placement verified by x-ray, blood return through all ports and free fluid flow  Insertion:  Uncomplicated  Patient tolerance:  Patient tolerated the procedure well with no immediate complications

## 2022-12-22 NOTE — PROGRESS NOTES
[address] [facility]  Hospitalist Group                                                                                          Hospitalist Progress Note  Brenda Fox MD  Answering service: [phone] from in house phone        Date of Service:  2022  NAME:  Natalia Bettencourt  :  1931  MRN:  [id number]      Admission Summary:   Natalia Bettencourt is a 80 y.o. female who initially presented to [facility] after mechanical fall. Per report, patient's son states patient just slid down the wall and her foot twisted and angulated her leg. Patient with known history of Alzheimer's dementia and most history cannot be obtained. Work-up shows patient with comminuted and angulated distal right femoral diaphyseal fracture. Per signout from hospitalist, patient has been boarded at [facility] for a week as there is no orthopedic available during this week and having difficulty transferring to other facilities for orthopedic evaluation. Patient was finally accepted by orthopedic at [facility] and therefore transferred here for further evaluation. Patient also noted to have hematoma around her fracture femur and noted low hemoglobin requiring transfusion of total of 2 units PRBCs, last unit was transfused today. Patient does have iron deficiency at baseline. Patient also was noted to be jaundiced, per hospitalist their impression was probable resumption from hematoma causing jaundice. No liver function tests were available at this time. Patient also with known history of atrial fibrillation and noted to have RVR for which patient was started on IV Cardizem and continuing on that. Patient chronically takes Coumadin for her A. fib as well as factor V Leyden. Patient is currently heparin drip on that.        Interval history / Subjective:   Patient unable to respond to questioning this morning due to advanced dementia, with possible delirium component secondary to severe pain.     This morning, patient's blood pressure was low with MAPs in 50s-60s. She was in atrial fibrillation with RVR. Dilt was held overnight due hypotension. Amio bolus given this morning followed by drip. Discussed at length with patient's son that patient has a poor prognosis, is a poor surgical candidate, and would be reasonable for hospice evaluation. Patient's son declined hospice and would like to pursue any available treatment, hoping for a miracle, per son.       Assessment & Plan:     Acute anemia  History of Cold Agglutination   - Presumed likely secondary to hematoma from acute fracture site, confirmed on CTA   - Patient is s/p transfusion of 2 units PRBCs at OSH  - Hemoglobin down to 5.3 this morning, she has a history of cold agglutination; blood received from Blood Bank, however unclear if [facility] has a warmer available for blood  - Hematology consulted, patient cannot receive blood safely without a warmer, if not available at [facility] will have to initiate transfer to OSH   - Anticoagulation held due to concern for active bleeding given, risks and benefits discussed with patient's son given history of Factor V Leiden     Active 5 Leiden  -Patient on anticoagulation with Coumadin at home  -Currently holding heparin drip given worsening anemia  -Appreciate hematology consulted     Popliteal DVT, right  - Noted on CTA, likely acute in setting of immobility from fracture, plus factor V leiden   - Holding AC in setting of worsening anemia     Hypotension  - Likely in the setting of poor oral intake and potential bleeding   - worsened by opioid use  - Give blood once obtained warmer  - Fluid bolus, though patient is edematous with oxygen requirement and concern for pulm edema      Right distal femoral fracture  -Patient with femoral fracture due to trauma, x-ray showing comminuted angulated distal right femur fracture  -Hospitalist at [facility] has consulted orthopedic team who accepted patient for transfer for evaluation  -Not a surgical candidate at this time, ortho to defer for now      Atrial fibrillation with RVR  -Patient with known history of atrial fibrillation, noted RVR requiring Cardizem drip prior to this transfer  -Cardiology consult for further evaluation  - Dilt drip held overnight, started amio with bolus   - Appreciate cardiology assistance      Jaundice  -Likely hemolyzing in setting of bleeding, clots   - Hematology consulted   - CMP daily      Alzheimer's dementia  -HOLDING Aricept and Namenda given unable to accept po at this time  -Supportive care     Poor nutritional status   - Patient has not been accepting po or any oral interventions  - Will give blood, if patient does not improve will discuss need for TPN v NGT, however patient has overall poor prognosis and would recommend consideration of hospice   - Continue D5 1/2 normal saline    Code status: DNR, discussed with family on 12/22  Prophylaxis: HOLD given concern for bleed  Care Plan discussed with: family, nursing, consultant  Anticipated Disposition: >48h     Hospital Problems  Never Reviewed            Codes Class Noted POA    A-fib [facility]) ICD-10-CM: I48.91  ICD-9-CM: 427.31  12/21/2022 Unknown             Review of Systems:   A comprehensive review of systems was negative except for that written in the HPI. Vital Signs:    Last 24hrs VS reviewed since prior progress note. Most recent are:  Visit Vitals  BP (!) 93/46   Pulse (!) 129   Temp 98.5 °F (36.9 °C)   Resp 27   SpO2 99%         Intake/Output Summary (Last 24 hours) at 12/22/2022 1416  Last data filed at 12/21/2022 1716  Gross per 24 hour   Intake 0 ml   Output 100 ml   Net -100 ml        Physical Examination:     I had a face to face encounter with this patient and independently examined them on 12/22/2022 as outlined below:          Constitutional:  In distress, repetitive wording, does not respond to questions   ENT:  Oral mucosa dry, black hairy tongue.  NC in place   Resp: CTA bilaterally. No wheezing/rhonchi/rales. No accessory muscle use. CV:  IRIR, tachycardiac, no murmurs, gallops, rubs    GI:  Soft, non distended, non tender    Musculoskeletal:  Warm, 2+ pulses throughout. RLE in immobilizer, with significant edema, significant pain to palpation     Neurologic:  Does not answer questions, moves arms spontaneously, face is symmetric             Data Review:    Review and/or order of clinical lab test  Review and/or order of tests in the radiology section of CPT  Review and/or order of tests in the medicine section of CPT      Labs:     Recent Labs     12/22/22  0144 12/21/22  1005   WBC 6.8 9.8   HGB 5.3* 6.1*   HCT 16.0* 17.3*   * 195     Recent Labs     12/22/22  0144 12/21/22  0340 12/20/22  0310   * 149* 148*   K 3.5 4.1 4.8   * 114* 114*   CO2 28 27 24   BUN 22* 33* 37*   CREA 0.60 0.82 0.89   * 139* 94   CA 9.0 9.1 9.0   MG  --   --  2.2   PHOS  --   --  2.2*     Recent Labs     12/22/22  0144   ALT 21   AP 76   TBILI 3.8*   TP 5.5*   ALB 2.4*   GLOB 3.1     Recent Labs     12/21/22  1005   APTT 26.7      No results for input(s): FE, TIBC, PSAT, FERR in the last 72 hours. No results found for: FOL, RBCF   No results for input(s): PH, PCO2, PO2 in the last 72 hours. No results for input(s): CPK, CKNDX, TROIQ in the last 72 hours.     No lab exists for component: CPKMB  No results found for: CHOL, CHOLX, CHLST, CHOLV, HDL, HDLP, LDL, LDLC, DLDLP, TGLX, TRIGL, TRIGP, CHHD, CHHDX  Lab Results   Component Value Date/Time    Glucose (POC) 103 12/17/2022 03:46 AM     Lab Results   Component Value Date/Time    Color Dark Yellow 12/16/2022 07:45 AM    Appearance Clear 12/16/2022 07:45 AM    Specific gravity 1.023 12/16/2022 07:45 AM    pH (UA) 5.5 12/16/2022 07:45 AM    Protein 30 (A) 12/16/2022 07:45 AM    Glucose Negative 12/16/2022 07:45 AM    Ketone Trace (A) 12/16/2022 07:45 AM    Bilirubin Small (A) 12/16/2022 07:45 AM    Urobilinogen 1.0 12/16/2022 07:45 AM    Nitrites Negative 12/16/2022 07:45 AM    Leukocyte Esterase Negative 12/16/2022 07:45 AM    Epithelial cells Few 12/16/2022 07:45 AM    Bacteria 1+ (A) 12/16/2022 07:45 AM    WBC 0-4 12/16/2022 07:45 AM    RBC 0-5 12/16/2022 07:45 AM         Medications Reviewed:     Current Facility-Administered Medications   Medication Dose Route Frequency    [Held by provider] 0.9% sodium chloride infusion 250 mL  250 mL IntraVENous PRN    amiodarone (CORDARONE) 375 mg/250 mL D5W infusion  0.5-1 mg/min IntraVENous TITRATE    morphine injection 1 mg  1 mg IntraVENous Q3H PRN    sodium chloride 0.9 % bolus infusion 500 mL  500 mL IntraVENous ONCE    lactated ringers bolus infusion 1,000 mL  1,000 mL IntraVENous ONCE    dextrose 5% infusion  50 mL/hr IntraVENous CONTINUOUS    [Held by provider] dilTIAZem ER (CARDIZEM CD) capsule 120 mg  120 mg Oral DAILY    [Held by provider] donepeziL (ARICEPT) tablet 5 mg  5 mg Oral QHS    [Held by provider] memantine (NAMENDA) tablet 5 mg  5 mg Oral BID    dextrose 5 % - 0.45% NaCl infusion  125 mL/hr IntraVENous CONTINUOUS    dilTIAZem (CARDIZEM) 125 mg in dextrose 5% 125 mL infusion  0-15 mg/hr IntraVENous TITRATE    [Held by provider] heparin 25,000 units in D5W 250 ml infusion  12-25 Units/kg/hr IntraVENous TITRATE     ______________________________________________________________________  EXPECTED LENGTH OF STAY: 4d 2h  ACTUAL LENGTH OF STAY:          Rand Raygoza MD       CRITICAL CARE ATTESTATION:  I had a face to face encounter with the patient, reviewed and interpreted patient data including clinical events, labs, images, vital signs, I/O's, and examined patient. I have discussed the case and the plan and management of the patient's care with the consulting services, the bedside nurses and necessary ancillary providers.        NOTE OF PERSONAL INVOLVEMENT IN CARE   This patient has a high probability of imminent, clinically significant deterioration, which requires the highest level of preparedness to intervene urgently. I participated in the decision-making and personally managed or directed the management of the following life and organ supporting interventions that required my frequent assessment to treat or prevent imminent deterioration. I personally spent 30 minutes of critical care time. This is time spent at this critically ill patient's bedside actively involved in patient care as well as the coordination of care and discussions with the patient's family. This does not include any procedural time which has been billed separately.

## 2022-12-22 NOTE — PROGRESS NOTES
ORTHO FRACTURE PROGRESS NOTE    2022  Admit Date:   2022    Post Op day: * No surgery date entered *    Subjective:    Dahlia Davies is more alert but also more agitated this morning. Spoke with Hospitalist about current condition and have noted and reviewed the CTA obtained overnight due to bleeding/anemia concerns. Anemia worsening and now in the 5s but has cold agglutinins. Also has tenuous IV access at this visit but awaiting more centralized access.       PT/OT:   Gait:                    Vital Signs:    Patient Vitals for the past 8 hrs:   BP Temp Pulse Resp   22 1218 -- -- (!) 129 --   22 1157 (!) 93/46 -- (!) 134 27   22 1156 -- -- (!) 129 25   22 1155 -- -- (!) 134 30   22 1154 (!) 82/36 -- (!) 133 24   22 1140 (!) 97/28 -- (!) 131 22   22 1139 -- -- (!) 124 28   22 1138 (!) 93/37 -- (!) 140 (!) 38   22 1100 (!) 103/50 98.5 °F (36.9 °C) (!) 136 20   22 1005 -- -- (!) 127 --   22 0947 (!) 106/46 -- (!) 126 25   22 0915 (!) 105/43 -- (!) 130 (!) 31   22 0900 102/60 -- (!) 124 28   22 0800 (!) 106/50 -- (!) 138 (!) 34   22 0715 (!) 101/51 -- -- --   22 0548 -- -- (!) 117 --     Temp (24hrs), Av °F (36.7 °C), Min:97.2 °F (36.2 °C), Max:98.5 °F (36.9 °C)      Pain Control:   Pain Assessment  Pain Scale 1: Adult Nonverbal Pain Scale    Meds:    Current Facility-Administered Medications   Medication Dose Route Frequency    [Held by provider] 0.9% sodium chloride infusion 250 mL  250 mL IntraVENous PRN    amiodarone (CORDARONE) 375 mg/250 mL D5W infusion  0.5-1 mg/min IntraVENous TITRATE    morphine injection 1 mg  1 mg IntraVENous Q3H PRN    sodium chloride 0.9 % bolus infusion 500 mL  500 mL IntraVENous ONCE    lactated ringers bolus infusion 1,000 mL  1,000 mL IntraVENous ONCE    dextrose 5% infusion  50 mL/hr IntraVENous CONTINUOUS    [Held by provider] dilTIAZem ER (CARDIZEM CD) capsule 120 mg 120 mg Oral DAILY    [Held by provider] donepeziL (ARICEPT) tablet 5 mg  5 mg Oral QHS    [Held by provider] memantine (NAMENDA) tablet 5 mg  5 mg Oral BID    dextrose 5 % - 0.45% NaCl infusion  125 mL/hr IntraVENous CONTINUOUS    dilTIAZem (CARDIZEM) 125 mg in dextrose 5% 125 mL infusion  0-15 mg/hr IntraVENous TITRATE    [Held by provider] heparin 25,000 units in D5W 250 ml infusion  12-25 Units/kg/hr IntraVENous TITRATE       LAB:    Recent Labs     12/22/22  0144   HCT 16.0*   HGB 5.3*       Transfuse PRBC's:      Assessment & Physician's Comment:  Neurovascular checks within normal limits  Orientation:  Disoriented (baseline)  Right thigh remains swollen but compartments soft and compressible; remains in [place] traction; moves grossly through toes but does not respond to command; cap refill brisk    Active Problems:    A-fib ([address] (12/21/2022)        Plan:    Long discussion had with Hospitalist about overall plan. Reviewed CTA which shows no signs of active bleeding and the blood observed in the region of the fracture is expected considering the extent of the fracture as well as the extended timeline with lack of definitive plan at outside hospital.   Her anemia is predominantly from her femur fracture and losses from within the bone but does not appear to have area of active bleeding. Ortho service very aware of tenuous nature of patient condition and are not planning surgical intervention until such time as patient is able to be stabilized medically understanding that this may be difficult if not impossible. Any surgical intervention (in her case an ORIF with placement of lateral plate due to presence of ipsilateral total hip) would carry a high risk for further decompensation and blood loss and would still require patient to remain NWB through the operative leg post-op regardless.  That being said patient likely would be able to achieve some level of pain relief following fracture fixation  Had long discussion at bedside with son (Eddie) who understands that his mother is in a precarious position and wants the best for her but also still wants to be aggressive with care decisions.   Remain in [address] traction for now  Pain control per primary team  Awaiting transfusion  Medical management per primary team  Will follow for clinical improvement before further surgical interventions are planned    Patient condition to be discussed with Dr Jerilyn Eason [phone]

## 2022-12-22 NOTE — PROGRESS NOTES
Spiritual Care Assessment/Progress Note  [address]      NAME: Javier Arnett      MRN: [id number]  AGE: 80 y.o. SEX: female  Adventist Affiliation: No preference   Language: English     12/22/2022     Total Time (in minutes): 15     Spiritual Assessment begun in [facility] through conversation with:         []Patient        [] Family    [] Friend(s)        Reason for Consult: Palliative Care, Initial/Spiritual Assessment     Spiritual beliefs: (Please include comment if needed)     [] Identifies with a checo tradition:         [] Supported by a checo community:            [] Claims no spiritual orientation:           [] Seeking spiritual identity:                [] Adheres to an individual form of spirituality:           [x] Not able to assess:                           Identified resources for coping:      [] Prayer                               [] Music                  [] Guided Imagery     [] Family/friends                 [] Pet visits     [] Devotional reading                         [x] Unknown     [] Other:                                               Interventions offered during this visit: (See comments for more details)    Patient Interventions: Initial visit           Plan of Care:     [] Support spiritual and/or cultural needs    [] Support AMD and/or advance care planning process      [] Support grieving process   [] Coordinate Rites and/or Rituals    [] Coordination with community clergy   [] No spiritual needs identified at this time   [] Detailed Plan of Care below (See Comments)  [] Make referral to Music Therapy  [] Make referral to Pet Therapy     [] Make referral to Addiction services  [] Make referral to [facility]  [] Make referral to Spiritual Care Partner  [] No future visits requested        [] Contact Spiritual Care for further referrals     Attempted visit for palliative initial spiritual assessment. Unable to visit patient at this time.  Patient was sleeping and did not awake. No family present. Patient's chart was consulted.   Chaplain Sharma MDiv, MS, [facility]

## 2022-12-22 NOTE — CONSULTS
ORTHOPAEDIC CONSULT NOTE    Subjective:     Date of Consultation:  December 21, 2022  Referring Physician:  Adam Cummins is a 80 y.o. female with past medical history significant for factor V Leiden with chronic Coumadin use and Alzheimer's dementia now with a very unfortunate situation related to a right femur fracture is seen for the same. Patient recently transferred here from outside facility who was requesting orthopedics coverage. Patient arrives and is not responsive to questions. Called and spoke with her son Winston Wheatley - [phone]) who relays that patient was walking between her living room and kitchen last Thursday when she got her foot stuck on some carpet and twisted her rounds ultimately feeling a pop in her leg and sinking to the floor. Her  noticed deformity in the leg and that she was unable to stand. EMS was summoned and she was taken to a hospital where she was found to have a comminuted distal femur fracture. She was started on morphine and that hospital began looking for facility with orthopedics coverage as they had none. For some reason it has taken about 7 days to find an accepting facility. During this time patient has become sequentially more ill to include A. fib with RVR as well as significant anemia likely some liver dysfunction. Patient was ultimately transferred here for further management. She is unable to participate in history or physical requests. This history was obtained via chart review as well as speaking to her son. Patient Active Problem List    Diagnosis Date Noted    A-fib [facility]) 12/21/2022    Femoral fracture ([facility] 12/17/2022    Alzheimer's dementia ([facility] 12/16/2022    Femur fracture ([facility] 12/15/2022    Atrial fibrillation, chronic ([facility] 12/15/2022     No family history on file.    Social History     Tobacco Use    Smoking status: Not on file    Smokeless tobacco: Not on file   Substance Use Topics    Alcohol use: Not on file     Past Medical History:   Diagnosis Date    Blood disorder     Dementia ([facility]       No past surgical history on file. Prior to Admission medications    Medication Sig Start Date End Date Taking? Authorizing Provider   warfarin (COUMADIN) 10 mg tablet Take 10 mg by mouth. MWF    Oliver Cotton MD   warfarin (COUMADIN) 7.5 mg tablet Take 7.5 mg by mouth. TRSaSu    Oliver Cotton MD   memantine (NAMENDA) 10 mg tablet Take  by mouth two (2) times a day. Oliver Cotton MD   donepeziL (ARICEPT) 5 mg tablet Take  by mouth nightly. Oliver Cotton MD   dilTIAZem ER [facility]) 120 mg capsule Take 120 mg by mouth daily. Oliver Cotton MD   folic acid 405 mcg tablet Take 400 mcg by mouth daily. Oliver Cotton MD   ferrous gluconate 324 mg (38 mg iron) tablet Take  by mouth Daily (before breakfast). Oliver Cotton MD     Current Facility-Administered Medications   Medication Dose Route Frequency    [Held by provider] dilTIAZem ER (CARDIZEM CD) capsule 120 mg  120 mg Oral DAILY    donepeziL (ARICEPT) tablet 5 mg  5 mg Oral QHS    memantine (NAMENDA) tablet 5 mg  5 mg Oral BID    dextrose 5 % - 0.45% NaCl infusion  125 mL/hr IntraVENous CONTINUOUS    dilTIAZem (CARDIZEM) 125 mg in dextrose 5% 125 mL infusion  0-15 mg/hr IntraVENous TITRATE    heparin 25,000 units in D5W 250 ml infusion  12-25 Units/kg/hr IntraVENous TITRATE      Allergies   Allergen Reactions    Penicillins Anaphylaxis        Review of Systems:  Review of systems not obtained due to patient factors.     Mental Status: severe dementia    Objective:     Patient Vitals for the past 8 hrs:   BP Temp Pulse Resp SpO2   22 1947 -- -- (!) 117 -- --   22 1902 -- -- 96 24 --   22 1804 123/71 98.1 °F (36.7 °C) (!) 115 17 99 %   22 1716 119/61 98.3 °F (36.8 °C) (!) 107 21 92 %     Temp (24hrs), Av.1 °F (36.7 °C), Min:97.7 °F (36.5 °C), Max:98.3 °F (36.8 °C)      EXAM: Patient is asleep and noncommunicative; grimaces with movement of her right lower extremity  Right lower extremity found with a Hernandez's traction boot intact but not attached to any weight or pulley  Right thigh significantly swollen in comparison to the left but all compartments are soft and compressible; no significant erythema or ecchymosis noted  No grimace with palpation about the knee joint or ankle joint on the right  Noted to move ankle and toes grossly but not to command  Distal pulses palpable    Imaging Review:   Right femur     History:  Pain, fall     Comparison:  No prior study     Findings:     3 views of the femur were performed. 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 Comminuted distal  femoral diaphyseal fracture is present. The main distal fracture fragment is  angulated laterally. A butterfly fracture fragment is associated. Fracture line  does not quite reach the distal stem of the femoral prosthesis. Prominent  vascular calcifications are present. Partially visualized degenerative changes  are present at the right knee and in the lower lumbar spine. IMPRESSION     Comminuted and angulated distal right femoral diaphyseal fracture. Evidence of  prior right total hip arthroplasty. Labs:   Recent Results (from the past 24 hour(s))   CBC WITH AUTOMATED DIFF    Collection Time: 12/21/22  3:40 AM   Result Value Ref Range    WBC 8.7 4.6 - 13.2 K/uL    RBC 1.89 (L) 4.20 - 5.30 M/uL    HGB 6.3 (L) 12.0 - 16.0 g/dL    HCT 19.4 (L) 35.0 - 45.0 %    .6 (H) 78.0 - 100.0 FL    MCH 33.3 24.0 - 34.0 PG    MCHC 32.5 31.0 - 37.0 g/dL    RDW 16.6 (H) 11.6 - 14.5 %    PLATELET 972 847 - 814 K/uL    MPV 11.1 9.2 - 11.8 FL    NRBC 1.1 (H) 0.0  WBC    ABSOLUTE NRBC 0.10 (H) 0.00 - 0.01 K/uL    NEUTROPHILS 84 (H) 40 - 73 %    LYMPHOCYTES 6 (L) 21 - 52 %    MONOCYTES 9 3 - 10 %    EOSINOPHILS 0 0 - 5 %    BASOPHILS 0 0 - 2 %    IMMATURE GRANULOCYTES 1 (H) 0 - 0.5 %    ABS. NEUTROPHILS 7.3 1.8 - 8.0 K/UL    ABS. LYMPHOCYTES 0.6 (L) 0.9 - 3.6 K/UL    ABS. MONOCYTES 0.7 0.05 - 1.2 K/UL    ABS. EOSINOPHILS 0.0 0.0 - 0.4 K/UL    ABS. BASOPHILS 0.0 0.0 - 0.1 K/UL    ABS. IMM. GRANS. 0.1 (H) 0.00 - 0.04 K/UL    DF AUTOMATED     METABOLIC PANEL, BASIC    Collection Time: 12/21/22  3:40 AM   Result Value Ref Range    Sodium 149 (H) 136 - 145 mmol/L    Potassium 4.1 3.5 - 5.5 mmol/L    Chloride 114 (H) 100 - 111 mmol/L    CO2 27 21 - 32 mmol/L    Anion gap 8 3.0 - 18.0 mmol/L    Glucose 139 (H) 74 - 99 mg/dL    BUN 33 (H) 7 - 18 mg/dL    Creatinine 0.82 0.60 - 1.30 mg/dL    BUN/Creatinine ratio 40 (H) 12 - 20      eGFR >60 >60 ml/min/1.73m2    Calcium 9.1 8.5 - 10.1 mg/dL   NT-PRO BNP    Collection Time: 12/21/22  3:40 AM   Result Value Ref Range    NT pro-BNP 4,675 (H) 0 - 1,800 pg/mL   CBC WITH AUTOMATED DIFF    Collection Time: 12/21/22 10:05 AM   Result Value Ref Range    WBC 9.8 4.6 - 13.2 K/uL    RBC 1.63 (L) 4.20 - 5.30 M/uL    HGB 6.1 (L) 12.0 - 16.0 g/dL    HCT 17.3 (LL) 35.0 - 45.0 %    .1 (H) 78.0 - 100.0 FL    MCH 37.4 (H) 24.0 - 34.0 PG    MCHC 35.3 31.0 - 37.0 g/dL    RDW 20.0 (H) 11.6 - 14.5 %    PLATELET 881 338 - 441 K/uL    MPV 10.6 9.2 - 11.8 FL    NRBC 1.5 (H) 0.0  WBC    ABSOLUTE NRBC 0.15 (H) 0.00 - 0.01 K/uL    NEUTROPHILS 85 (H) 40 - 73 %    LYMPHOCYTES 6 (L) 21 - 52 %    MONOCYTES 8 3 - 10 %    EOSINOPHILS 0 0 - 5 %    BASOPHILS 0 0 - 2 %    IMMATURE GRANULOCYTES 1 (H) 0 - 0.5 %    ABS. NEUTROPHILS 8.4 (H) 1.8 - 8.0 K/UL    ABS. LYMPHOCYTES 0.6 (L) 0.9 - 3.6 K/UL    ABS. MONOCYTES 0.8 0.05 - 1.2 K/UL    ABS. EOSINOPHILS 0.0 0.0 - 0.4 K/UL    ABS. BASOPHILS 0.0 0.0 - 0.1 K/UL    ABS. IMM.  GRANS. 0.1 (H) 0.00 - 0.04 K/UL    DF AUTOMATED     PTT    Collection Time: 12/21/22 10:05 AM   Result Value Ref Range    aPTT 26.7 23.0 - 36.4 sec    aPTT, therapeutic range   82 - 109 sec         Impression:     Active Problems:    A-fib ([address] (12/21/2022)        Plan:     Subacute comminuted right distal femur fracture    Patient treatment course is unfortunate over the past week.   Fortunately she has been placed in Hernandez's traction prior to transfer which likely gave her some mild amount of relief but unfortunately patient is now dramatically anemic as well as having multiple other medical issues  Have placed back in weighted Hernandez's traction  Admitted to hospitalist service for evaluation and medical management  Patient will likely require significant optimization prior to being safe for surgery  Hoping for transfusion tonight due to anemia  Maintain bedrest and traction  We will make n.p.o. after midnight but likely will be allowed to eat tomorrow although she has been refusing anything by mouth apparently for a couple of days  Likely to require cardiology clearance at a minimum if not other services  Discussed in detail with the patient's son who plans on coming to the hospital tomorrow  Have posted for an ORIF of the left femur for tomorrow but this will likely need to be postponed  Medical management per primary team    Dr. Kristy Mcintyre aware of patient and in agreement with current plan of care      Leonard William PA-C  Orthopedic Trauma Service  [facility]

## 2022-12-22 NOTE — PROGRESS NOTES
Patient's hemoglobin 5.3. Discussed with Dr. Crow Aaron. Will hold heparin secondary to bleeding/ increased risk of bleeding. Transfuse 2 units pRBCs- Per chart it is noted that patient has cold agglutination. Will need blood warmer for transfusion. Patient at high risk for decompensation. 0435: Seen patient at bedside with Dr. Crow Aaron. Patient hypotensive with SBP 80's. Diltiazem gtts has been paused. Will give albumin, NS 500ml bolus and do CT w wo LLE. Consulted palliative.

## 2022-12-22 NOTE — PROGRESS NOTES
[address] [facility]  [address] [place] [address] Ronni Reilly  W: [phone]  F: [phone]    Reason for Evaluation:   Javier Arnett is a 80 y.o. female who is seen in consultation at the request of Dr. Keara Abrams for evaluation of cold agglutinin disease. History of Present Illness:   Javier Arnett is a pleasant 80 y.o. 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 Patient was transferred to [facility] for comminuted right femoral fracture c/b hematoma. On transfer, hemoglobin 5.3 downtrending from ~10 on admission, macrocytic, bilirubin progressively rising. CTA RLE with lobulated high density tissue within distal femoral shaft, possibly hemorragic metastatic disease, as well as hemorrhage in soft tissues around the fracture without active extravasation. Also with acute thrombus within right superficial femoral vein and popliteal veins. 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 INR on 12/19/22 1.8. Hospital course complicated by a fib with RVR. Review of systems was obtained and pertinent findings reviewed above. Past medical history, social history, family history, medications, and allergies are located in the electronic medical record.     Physical Exam:   Visit Vitals  BP (!) 93/46   Pulse (!) 121   Temp 98.5 °F (36.9 °C)   Resp 27   SpO2 99%     ECOG PS: 4  General: frail, cachetic, appears uncomfortable, moaning and mumbling  Eyes: +scleral icetrus  HENT: extremely dry mucous membranes  Neck: supple  Lymphatic: no cervical, supraclavicular adenopathy  Respiratory: normal respiratory effort  CV: tachycardic, irregular, systolic murmur, no peripheral edema  Ext: RLE in traction, +edema bilaterally  GI: soft, nontender, nondistended, no masses  Skin: no rashes, no ecchymoses, no petechiae  Neuro: not oriented     Results:     Lab Results   Component Value Date/Time    WBC 6.8 12/22/2022 01:44 AM    HGB 5.3 (LL) 12/22/2022 01:44 AM    HCT 16.0 (LL) 12/22/2022 01:44 AM    PLATELET 447 (L) 19/09/6780 01:44 AM    MCV 99.4 (H) 12/22/2022 01:44 AM    ABS. NEUTROPHILS 5.7 12/22/2022 01:44 AM     Lab Results   Component Value Date/Time    Sodium 146 (H) 12/22/2022 01:44 AM    Potassium 3.5 12/22/2022 01:44 AM    Chloride 116 (H) 12/22/2022 01:44 AM    CO2 28 12/22/2022 01:44 AM    Glucose 130 (H) 12/22/2022 01:44 AM    BUN 22 (H) 12/22/2022 01:44 AM    Creatinine 0.60 12/22/2022 01:44 AM    Calcium 9.0 12/22/2022 01:44 AM    Glucose (POC) 103 12/17/2022 03:46 AM     Lab Results   Component Value Date/Time    Bilirubin, total 3.8 (H) 12/22/2022 01:44 AM    ALT (SGPT) 21 12/22/2022 01:44 AM    Alk. phosphatase 76 12/22/2022 01:44 AM    Protein, total 5.5 (L) 12/22/2022 01:44 AM    Albumin 2.4 (L) 12/22/2022 01:44 AM    Globulin 3.1 12/22/2022 01:44 AM     Records from [facility] reviewed and summarized above. Test results above have been reviewed. Assessment:     #) Macrocytic anemia, multifactorial due to cold agglutinin disease and acute blood loss anemia from hematoma:  Hemoglobin precipitously down-trending from 10.8 on admission to 5.3 (12/22/22) with evidence of active hemolysis (haptoglobin 8, progressively rising bilirubin) due to cold agglutinin disease. Also with blood loss from hematoma in setting of femoral fracture. No active extravasation on CTA. For cold agglutinin disease, treatment primarily involves avoidance of cold and supportive care with blood transfusions with warmed blood. For patients with severe anemia, plasmapheresis may be indicated to help remove cold agglutinins as a temporizing measure. Importantly, cold agglutinin disease is typically due to underlying disorder, frequently lymphoproliferative disorder or lymphoid malignancy (such as non hodgkin lymphoma or Waldenstrom macroglobulinemia) or autoimmune disorder.   There is concern for possible osseous metastases as cause of patient's femoral fracture, which further increases the concern for underlying occult malignancy. She is not a good candidate for PLEX based on her age, frailty, co-morbidities, and volume overload (with anticipated volume shifts from PLEX). Therefore, recommend supportive care with warmed transfusions and warming blankets. Goals of care conversations ongoing with Palliative Care and family. #) Comminuted right femoral fracture, possibly due to osseous met of unknown primary, c/b hematoma: Patient is currently very high risk of perioperative cardiovascular and cerebrovascular complications. Currently not an operative candidate. No active extravasation on CTA of lower extremity from 12/22/2022. #) Acute proximal and distal DVT in patient with FVL:   Patient reportedly on coumadin at home. INR sub-therapeutic (1.8 on 12/19/22). 12/22/22 CTA of RLE with acute thrombus within the right superficial femoral vein and popliteal vein. Risk factors: a) Factor V Leiden, b) immobility. Not eligible for anticoagulation due to active hemorrhage and severe anemia. If aggressive care desired, then would need to consider IVC filter. Would also recommend chest CTA to rule out concomitant PE. #) A fib with RVR: Status post amiodarone drip. Management per cardiology. #) Alzheimer's dementia: aricept and namenda on hold    #) Protein-calorie malnutrition: patient refusing oral interventions. 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 She would not be eligible for any cancer-directed therapy given her debilitated state and ECOG 4.   PLEX as a temporizing measure in the setting of untreated malignnacy would not provide any benefit given her frailty, co-morbidities, risk of decompensated heart failure (from volume shifts with PLEX), and inability to tolerate FFP (due to FVL and known acute thrombosis)    Supportive Care as follows:   Transfuse PRBCs with warmer to maintain hemoglobin >7  IVFs should be warmed   Recommend cold avoidance and keeping room at warm temperature. Consider warming blanket. Agree with holding anti-coagulation for acute DVT. Patient is at high risk of ongoing clot and embolism but would hold off on IVC filter given clinical instability  Management of a fib with RVR per medicine/cardiology. May improve with correction of anemia. Attempted to call jalen Morgan. Did not  and mailbox full. Hematology will continue to follow along    Addendum: Spoke with the jalen Morgan and voiced my recommendation for hospice as above. I discussed that she is not eligible for treatments to slow down the red blood cell breakage and that this is likely driven by an undiagnosed cancer. Without treatment of potential cancer, she is likely to continue to hemolyze her blood cells and temporizing measures with PLEX would place her at risk of further decompensation, especially in the presence of volume overload and known coagulopathy with active clot. She cannot receive anti-coagulation because of bleeding risk and severe anemia but she remains at very high risk of evolution or embolization of clot. He recognizes that she is at high risk of dying despite our supportive cares. He wishes to discuss with remaining family members.     Signed By: Dougie Marie MD

## 2022-12-22 NOTE — PROGRESS NOTES
Speech Therapy Contact Note    SLP received order for evaluation. Chart reviewed and patient discussed with RN. Patient with nonsensical verbal output and was not consistently redirectable to basic conversation. Patient appeared to be in pain with slight elevation of HOB. Will defer swallow evaluation until mentation and ability to sit upright are improved.      Natalee Orosco, MICHAEL-SLP

## 2022-12-22 NOTE — PROGRESS NOTES
1145: Entered patient's room to place PICC line. Consent obtained from son, Eddie, for PICC placement. Right after patient's son left room, Dr. Yolis Kaufman and primary RN entered room concerned about patients BP. Dr. Yolis Kaufman and RN contemplating rapid response. Asked Dr. Yolis Kaufman if an IJ would be more appropriate than PICC, especially since patient will need CXR for confirmation before use. Per Dr. Yolis Kaufman, central line IJ order to be placed. Will discontinue PICC line at this time.

## 2022-12-23 NOTE — PROGRESS NOTES
Palliative Medicine Consult  Joseph: [phone]    Patient Name: Himanshu Del Valle  YOB: 1931    Date of Initial Consult: December 22, 2022  Reason for Consult: Care Decisions  Requesting Provider: Dr. Fay Flaherty  Primary Care Physician: None     SUMMARY:   Himanshu Del Valle is a 80 y.o. female admitted on 12/21/2022 from [facility] with a diagnosis of distal rt femoral fracture. Pt injured rt thigh after loosing footing. She was admitted to a hospital closer to her home but they did not have any ortho services. She was transferred to us after about one week of the initial injury. Other issues afib, acute anemia, jaundice, intractable pain. PMH:dementia, factor V Leiden on coumadin, afib, previous hip fracture    Current medical issues leading to Palliative Medicine involvement include: pt acutely ill with high risk of morbidity. She is not a candidate for surgery with critical anemia and hypotension. We have been asked to support with care decisions. .    Social: pt lives at home with spouse who reportedly is ill with some cognitive impairment  Son is the only child~son Count includes the [facility] [phone])     PALLIATIVE DIAGNOSES:   Shortness of breath  Hypotension  Delirium  Rt femur fracture  End of Life  Palliative Care Encounter      PLAN:   Pt seen without family present. She remains in distress constantly moaning, restless, and crying out. She appears frightened. We are unable to liberalize meds as she remains critically ill and son is not on board for comfort per our conversation yesterday. Called jalen Morgan to follow up and offer support  Revisited care goals and the updated him on the patient's condition. Son asked about the details of comfort care. I explained that aggressive measures would be discontinued including iv antibiotics, ivf, labs, diagnostics.   Explained the focus will be on the patient's pain and suffering  Explained that the pt could die moments after the transition or surprise us and live longer. Offered to wait until he could be with her before transitioning but he shared that it would be very difficult for him  Explained that hospice will be consulted. Son asked if she would remain in the hospital with hospice and insurance coverage. I explained that hospice will evaluate and determine the level of hospice care if she qualifies for. As shared that based on her symptoms she would meet criteria for inpatient hospice but hospice does make that final call not me explained that if the patient surprises us and has a miraculous recovery then hospice will talk about caring for her at home or in the community. Son asked if she were to miraculously improve would he be able to resume aggressive care and I informed him that he would. Decision made to transition to comfort  I have placed comfort orders and a hospice consult and spoke with hospice liaison who will try to see the patient today if she can  We have a on-call provider available for emergencies if needed    12/22/22: Pt seen with son at the bedside. Services explained  Inquired about his understanding of the patient's condition and wishes at this time  We discussed 2 paths of care . Explained that the path that she is on now is with the intention to prolong her life. Explained that despite best efforts her risk of dying remains high. Explained that with this path , we cannot optimize her pain due to her hypotension and she will suffering end of life. Explained that we are trying to obtain a safe access to administer blood . She would die if any surgical intervention attempted and this would not guarantee her relief. The other path would include a focus on pain and suffering without blood, diagnostics, labs, or testing. She would die peacefully. Son says he is not ready for hospice at this time .   He would like to at least give her one more chance and maybe miraculously she will improve enough to have surgery  Pt was clear of her wish to not resuscitate and stated that he understands that she could die either way but he has to try something. Supported son of his wish to continue aggressive efforts. Will continue to follow. [facility] and appreciates all the communication and efforts given his inability to be at the bedside more. Initial consult note routed to primary continuity provider and/or primary health care team members  Communicated plan of care with: Palliative Maricel STROUD 192 Team     GOALS OF CARE / TREATMENT PREFERENCES:     GOALS OF CARE:  Patient/Health Care Proxy Stated Goals: Comfort    TREATMENT PREFERENCES:   Code Status: DNR    Patient and family's personal goals include: continue current care     Advance Care Planning:  [x] [facility] Interdisciplinary Team has updated the ACP Navigator with [address] and Patient Capacity      Primary Decision Maker (Active): Hannah Mosher - [phone]    Advance Care Planning 12/15/2022   Confirm Advance Directive Yes, on file   Does the patient have other document types Power of        Medical Interventions: Comfort measures       Other:    As far as possible, the palliative care team has discussed with patient / health care proxy about goals of care / treatment preferences for patient.      HISTORY:     History obtained from: chart, team, family    CHIEF COMPLAINT: Pt admitted with aforementioned history and issues    HPI/SUBJECTIVE:    The patient is:   [] Verbal and participatory  [x] Non-participatory due to: medical condition        Clinical Pain Assessment (nonverbal scale for severity on nonverbal patients):   Clinical Pain Assessment  Severity: 10  Location: generalized  Character: UTO  Duration: UTO  Effect: UTO  Factors: UTO  Frequency: constant     Activity (Movement): Restless, excessive activity and/or withdrawal reflexes    Duration: for how long has pt been experiencing pain (e.g., 2 days, 1 month, years)  Frequency: how often pain is an issue (e.g., several times per day, once every few days, constant)     FUNCTIONAL ASSESSMENT:     Palliative Performance Scale (PPS):  PPS: 20       PSYCHOSOCIAL/SPIRITUAL SCREENING:     Palliative IDT has assessed this patient for cultural preferences / practices and a referral made as appropriate to needs (Cultural Services, Patient Advocacy, Ethics, etc.)    Any spiritual / Zoroastrian concerns:  [] Yes /  [x] No  [] Unable to obtain this information  If \"Yes\" to discuss with pastoral care during IDT     Does caregiver feel burdened by caring for their loved one:   [] Yes /  [x] No /  [] No Caregiver Present/Available [] No Caregiver [] Pt Lives at [place] [address]  If \"Yes\" to discuss with social work during IDT    Anticipatory grief assessment:   [x] Normal  / [] Maladaptive   [] Unable to obtain this information  [] n/a  If \"Maladaptive\" to discuss with social work during IDT    ESAS Anxiety: Anxiety: 10    ESAS Depression:          REVIEW OF SYSTEMS:     Positive and pertinent negative findings in ROS are noted above in HPI. The following systems were [] reviewed / [x] unable to be reviewed as noted in HPI  Other findings are noted below. Systems: constitutional, ears/nose/mouth/throat, respiratory, gastrointestinal, genitourinary, musculoskeletal, integumentary, neurologic, psychiatric, endocrine. Positive findings noted below. Modified ESAS Completed by: provider   Fatigue: 10 Drowsiness: 9     Pain: 10   Anxiety: 10     Anorexia: 10 Dyspnea: 5     Constipation: No     Stool Occurrence(s): 0        PHYSICAL EXAM:     From RN flowsheet:  Wt Readings from Last 3 Encounters:   12/19/22 134 lb (60.8 kg)     Blood pressure 106/87, pulse (!) 103, temperature 99.3 °F (37.4 °C), resp. rate 30, SpO2 94 %.     Pain Scale 1: Visual  Pain Intensity 1: 0     Pain Location 1: Generalized        Pain Intervention(s) 1: Medication (see MAR)  Last bowel movement, if known:     Constitutional: pt writhing in pain  Eyes:   ENMT: no nasal discharge, moist mucous membranes  Cardiovascular:   Respiratory: breathing labored, symmetric  Gastrointestinal:   Musculoskeletal: rt femur fracture  Skin: warm, dry  Neurologic: confused  Psychiatric: anxious, restless  Other:       HISTORY:     Active Problems:    A-fib ([facility] (12/21/2022)    Past Medical History:   Diagnosis Date    Blood disorder     Dementia ([facility]       History reviewed. No pertinent surgical history. History reviewed. No pertinent family history. History reviewed, no pertinent family history.   Social History     Tobacco Use    Smoking status: Not on file    Smokeless tobacco: Not on file   Substance Use Topics    Alcohol use: Not on file     Allergies   Allergen Reactions    Penicillins Anaphylaxis      Current Facility-Administered Medications   Medication Dose Route Frequency    collagenase (SANTYL) 250 unit/gram ointment   Topical DAILY    balsam peru-castor oiL (VENELEX) ointment   Topical BID    morphine injection 2 mg  2 mg IntraVENous Q5MIN PRN    glycopyrrolate (ROBINUL) injection 0.2 mg  0.2 mg IntraVENous Q4H PRN    midazolam (VERSED) injection 1 mg  1 mg IntraVENous Q5MIN PRN    dextrose 5% infusion  50 mL/hr IntraVENous CONTINUOUS    [Held by provider] dilTIAZem ER (CARDIZEM CD) capsule 120 mg  120 mg Oral DAILY    [Held by provider] donepeziL (ARICEPT) tablet 5 mg  5 mg Oral QHS    [Held by provider] memantine (NAMENDA) tablet 5 mg  5 mg Oral BID    dextrose 5 % - 0.45% NaCl infusion  125 mL/hr IntraVENous CONTINUOUS    [Held by provider] heparin 25,000 units in D5W 250 ml infusion  12-25 Units/kg/hr IntraVENous TITRATE          LAB AND IMAGING FINDINGS:     Lab Results   Component Value Date/Time    WBC 8.8 12/23/2022 03:24 AM    HGB 8.0 (L) 12/23/2022 03:24 AM    PLATELET 367 06/28/4843 03:24 AM     Lab Results   Component Value Date/Time    Sodium 139 12/23/2022 03:24 AM    Potassium HEMOLYZED,RECOLLECT REQUESTED 12/23/2022 03:24 AM    Chloride 112 (H) 12/23/2022 03:24 AM    CO2 28 12/23/2022 03:24 AM    BUN 18 12/23/2022 03:24 AM    Creatinine 0.70 12/23/2022 03:24 AM    Calcium 8.2 (L) 12/23/2022 03:24 AM    Magnesium 2.2 12/20/2022 03:10 AM    Phosphorus 2.2 (L) 12/20/2022 03:10 AM      Lab Results   Component Value Date/Time    Alk. phosphatase 82 12/23/2022 03:24 AM    Protein, total 5.7 (L) 12/23/2022 03:24 AM    Albumin 2.6 (L) 12/23/2022 03:24 AM    Globulin 3.1 12/23/2022 03:24 AM     Lab Results   Component Value Date/Time    INR 1.8 (H) 12/19/2022 04:00 AM    Prothrombin time 21.3 (H) 12/19/2022 04:00 AM    aPTT 26.7 12/21/2022 10:05 AM      Lab Results   Component Value Date/Time    Iron 31 (L) 12/22/2022 02:16 PM    TIBC 104 (L) 12/22/2022 02:16 PM    Iron % saturation 30 12/22/2022 02:16 PM    Ferritin 631 (H) 12/22/2022 02:16 PM      No results found for: PH, PCO2, PO2  No components found for: GLPOC   No results found for: CPK, CKMB             Total time: 65  minutes  Counseling / coordination time, spent as noted above:  60 minutes  > 50% counseling / coordination?: yes    Prolonged service was provided for  [x]30 min   []75 min in face to face time in the presence of the patient, spent as noted above. Time Start:1000~1100; 8039~6082   Time End:   Note: this can only be billed with  (initial) or 21 [phone] (follow up). If multiple start / stop times, list each separately.

## 2022-12-23 NOTE — PROGRESS NOTES
Contacted Dani Prado, Orthopedic PA; was referred to hospitalist to reassess need for STAT CT. Contacted Dr. Nguyen Campo, deferring CT to day shift.

## 2022-12-23 NOTE — WOUND CARE
WOCN Note:     New consult placed for assessment of sacrum. Chart reviewed. Assessed in room 452 with Chery CLEMENS and PCT's  Dr Alexa Santana assessed sacral wound. Admitted DX:  A-fib; fall with right femur fracture  Past Medical History:   Diagnosis Date    Blood disorder     Dementia ([facility]      Assessment:   Patient is alert, not oriented and requires assist with repositioning. Bed: [facility] air mattress with bucks traction  Patient has a Mederos. Patient premedicated for pain. Left Heel offloaded with foam and pillow. Left Heels intact with blanching pink erythema. Right leg in bucks traction unable to assess heel. Protective foam to elbows. 1. POA Sacrum, Unstageable Pressure Injury: 8 x 8 x 0.2 cm  15% open tan. 85% non blanching purple erythema; scant serous exudate; no odor. Surrounded by non blanching red erythema. Covered with foam dressing. 2.  Back, non blanching red erythema. 4 x 2 x 0 cm. Covered with foam dressing. Wound, Pressure Prevention & Skin Care Recommendations:    Minimize layers of linen/pads under patient to optimize support surface. 2.  Turn/reposition approximately every 2 hours and offload heels. 3.  Manage moisture/ Keep skin folds clean and dry/minimize brief usage. 4.  Specialty bed: [facility] air mattress. 5.  Sacrum:  Daily cleanse with saline; apply Santyl; cover with foam dressing. 6.  Back:  Venelex BID     Discussed above plan with Chery CLEMENS.     Transition of Care:   Plan to follow as needed while admitted to hospital.    Scottie Patten, TRACEYN RN [facility]  Available on [employer]  Office [phone]

## 2022-12-23 NOTE — PROGRESS NOTES
Cardiology Progress Note  2022    Admit Date: 2022  Admit Diagnosis: A-fib ([facility] [I48.91]  CC:  AF    Assessment/Recommendations:  Atrial fibrillation with RVR - HR improving  - continue IV amiodarone   - Unable to tolerate PO meds  - digoxin 125 mcg IV x 1. Will need close monitoring of digoxin levels due to interaction with amiodarone. Daily digoxin level  - anticoagulation on hold to anemia/bleeding  - poor overall prognosis. Agree with continued discussions re hospice     Acute on chronic HFpEF, moderate to severe TR  - will need close monitoring of respiratory status with volume resuscitation     Acute blood loss anemia  - Hgb improved with transfusion     Comminuted right femoral fracture c/b hematoma  - patient is currently very high risk of perioperative cardiovascular complications and needs optimization prior to considering surgery     Right popliteal DVT, h/o Factor V Leiden mutation, cold agglutinin disease  - anticoagulation is on hold due to anemia     Alzheimer's dementia     Thank you for this consult. We will continue to follow along. Please contact us for any further questions or concerns. Hospital problem list     Active Problems:    A-fib ([facility] (2022)                                                          Subjective:     -120s. Still encephalopathic.      ROS: Unable to review due to dementia                                               Objective:    Physical Exam:  24 hr VS reviewed, overall VSSAF  Temp (24hrs), Av.8 °F (37.1 °C), Min:98 °F (36.7 °C), Max:100.6 °F (38.1 °C)    Patient Vitals for the past 8 hrs:   Pulse   22 0800 (!) 104   22 0547 (!) 116   22 0350 (!) 118   22 0322 (!) 126   22 0157 (!) 108    Patient Vitals for the past 8 hrs:   Resp   22 0800 29   22 0322 24    Patient Vitals for the past 8 hrs:   BP   22 0800 127/65   22 0322 124/70          Intake/Output Summary (Last 24 hours) at 12/23/2022 0836  Last data filed at 12/22/2022 2330  Gross per 24 hour   Intake 789.17 ml   Output --   Net 789.17 ml     General: elderly and frail. In distress  HEENT: extremely dry mucous membranes  Neck: supple,   CV: tachycardic, irregular, ELSI  Lungs: coarse BS anteriorly  Abdomen: soft, non distended  MSK: RLE in traction with edema, mild LLE edema  Skin: warm to touch  Neuro: alert, in distress     Data Review:  Lab results reviewed as noted below. Current medications reviewed as noted below. Telemetry independently reviewed : []  sinus    [x]  chronic afib   HR 110s  []  par afib    []  NSVT    ECG independently reviewed: []  NSR    []  no significant changes   [x]  no new ECG provided for review    No results for input(s): PH, PCO2, PO2 in the last 72 hours. No results for input(s): CPK, CKMB in the last 72 hours.     No lab exists for component: TROPONINI  Recent Labs     12/23/22  0324 12/22/22  0144 12/21/22  1005 12/21/22  0340    146*  --  149*   K HEMOLYZED,RECOLLECT REQUESTED 3.5  --  4.1   * 116*  --  114*   CO2 28 28  --  27   BUN 18 22*  --  33*   CREA 0.70 0.60  --  0.82   * 130*  --  139*   CA 8.2* 9.0  --  9.1   ALB 2.6* 2.4*  --   --    WBC 8.8 6.8 9.8 8.7   HGB 8.0* 5.3* 6.1* 6.3*   HCT 24.4* 16.0* 17.3* 19.4*    144* 195 174     Recent Labs     12/23/22  0324 12/22/22  0144   AP 82 76   TBILI 7.8* 3.8*   TP 5.7* 5.5*   ALB 2.6* 2.4*   GLOB 3.1 3.1     Recent Labs     12/21/22  1005   APTT 26.7      Recent Labs     12/22/22  1416   TIBC 104*   PSAT 30   FERR 631*      Lab Results   Component Value Date/Time    Glucose (POC) 103 12/17/2022 03:46 AM       Current Facility-Administered Medications   Medication Dose Route Frequency    digoxin (LANOXIN) injection 125 mcg  125 mcg IntraVENous NOW    [Held by provider] 0.9% sodium chloride infusion 250 mL  250 mL IntraVENous PRN    amiodarone (CORDARONE) 375 mg/250 mL D5W infusion  0.5-1 mg/min IntraVENous TITRATE    morphine injection 1 mg  1 mg IntraVENous Q3H PRN    dextrose 5% infusion  50 mL/hr IntraVENous CONTINUOUS    [Held by provider] dilTIAZem ER (CARDIZEM CD) capsule 120 mg  120 mg Oral DAILY    [Held by provider] donepeziL (ARICEPT) tablet 5 mg  5 mg Oral QHS    [Held by provider] memantine (NAMENDA) tablet 5 mg  5 mg Oral BID    dextrose 5 % - 0.45% NaCl infusion  125 mL/hr IntraVENous CONTINUOUS    dilTIAZem (CARDIZEM) 125 mg in dextrose 5% 125 mL infusion  0-15 mg/hr IntraVENous TITRATE    [Held by provider] heparin 25,000 units in D5W 250 ml infusion  12-25 Units/kg/hr IntraVENous TITRATE         Eric Carbajal, [address] [facility]  12/23/22

## 2022-12-23 NOTE — DISCHARGE SUMMARY
Discharge Summary       PATIENT ID: Red Jerome  MRN: [id number]   YOB: 1931    DATE OF ADMISSION: 12/21/2022  5:00 PM    DATE OF DISCHARGE: 12/24/22    PRIMARY CARE PROVIDER: None     ATTENDING PHYSICIAN: Whitnye Reza MD   DISCHARGING PROVIDER: Whitney Reza MD    To contact this individual call [phone] and ask the  to page. If unavailable ask to be transferred the Adult Hospitalist Department. CONSULTATIONS: IP CONSULT TO CARDIOLOGY  IP CONSULT TO CARDIOLOGY  IP CONSULT TO ORTHOPEDIC SURGERY  IP CONSULT TO PALLIATIVE CARE - PROVIDER  IP CONSULT TO HEMATOLOGY    PROCEDURES/SURGERIES: Procedure(s):  RIGHT FEMUR DISTAL OPEN REDUCTION INTERNAL FIXATION  DISTAL FEMORAL LOCKING PLATE  SYNTHES AWARE  ESSENTIAL  REQ AFTER 1400    [address] COURSE:   R distal femoral fracture  A fib with RVR  Acute anemia  Jaundice  Factor 5 Leiden  Alzhemier's Dementia     Red Jerome is a 80 y.o. female who presented as a transfer from OSH for R distal femoral fracture requiring evaluation by orthopedic surgery. However, upon arrival patient was found to be a poor surgical candidate due to multiple risk factors including worsening anemia, poor nutritional status, unstable vital signs. Patient received 2U of pRBC for significant anemia of 5.3 during admission. Given patient's poor prognosis, palliative care was consulted and hospice was recommended to patient's family. Patient's son, Lan, ultimately decided to pursue comfort care only measures on 12/23. Patient was transferred to inpatient hospice on 12/24.      DISCHARGE DIAGNOSES / PLAN:       End of life care   Acute anemia  History of Cold Agglutination   Jaundice 2/2 Hemolysis   Popliteal DVT, right   Hypotension   Right distal femoral fracture  Atrial fibrillation with RVR  Acute hypoxic respiratory failure  Alzheimer's dementia  Poor nutritional status  Unstagable sacral pressure wound       PENDING TEST RESULTS:   At the time of discharge the following test results are still pending: None    FOLLOW UP APPOINTMENTS:    Follow-up Information       Follow up With Specialties Details Why Contact Info    None    None ([phone]) Patient stated that they have no PCP               ADDITIONAL CARE RECOMMENDATIONS: Per hospice provider       DISCHARGE MEDICATIONS:  Current Discharge Medication List            NOTIFY YOUR PHYSICIAN FOR ANY OF THE FOLLOWING:   Fever over 101 degrees for 24 hours. Chest pain, shortness of breath, fever, chills, nausea, vomiting, diarrhea, change in mentation, falling, weakness, bleeding. Severe pain or pain not relieved by medications. Or, any other signs or symptoms that you may have questions about. DISPOSITION:    Home With:   OT  PT  HH  RN       Long term SNF/Inpatient Rehab    Independent/assisted living   x Hospice    Other:       PATIENT CONDITION AT DISCHARGE:     Functional status   x Poor     Deconditioned     Independent      Cognition     Lucid     Forgetful    x Dementia      Catheters/lines (plus indication)    Mederos     PICC     PEG    x None      Code status     Full code    x DNR      PHYSICAL EXAMINATION AT DISCHARGE:  Visit Vitals  /60 (BP 1 Location: Left upper arm, BP Patient Position: At rest)   Pulse 98   Temp 99.5 °F (37.5 °C)   Resp 29   SpO2 96%      Constitutional:  Agonal breathing, does not appear in acute distress, does not respond to questions   ENT:  Oral mucosa dry, black hairy tongue. NC in place   Resp:  CTA bilaterally. No wheezing/rhonchi/rales. No accessory muscle use. CV:  IRIR, tachycardiac, no murmurs, gallops, rubs    GI:  Soft, non distended, non tender    Musculoskeletal:  Warm, 2+ pulses throughout.  RLE in immobilizer, with significant edema, significant pain to palpation     Neurologic:  Does not answer questions, moves arms spontaneously, face is symmetric          CHRONIC MEDICAL DIAGNOSES:  Problem List as of 12/24/2022 Never Reviewed Codes Class Noted - Resolved    A-fib [facility]) ICD-10-CM: I48.91  ICD-9-CM: 427.31  12/21/2022 - Present        Femoral fracture ([facility] ICD-10-CM: S72.90XA  ICD-9-CM: 821.00  12/17/2022 - Present        Alzheimer's dementia ([facility] ICD-10-CM: G30.9, F02.80  ICD-9-CM: 331.0, 294.10  12/16/2022 - Present        Femur fracture ([facility] ICD-10-CM: S72.90XA  ICD-9-CM: 821.00  12/15/2022 - Present        Atrial fibrillation, chronic (HCC) ICD-10-CM: I48.20  ICD-9-CM: 427.31  12/15/2022 - Present           Greater than 30 minutes were spent with the patient on counseling and coordination of care    Signed:   Raquel Zamora MD  12/24/2022  5:04 PM

## 2022-12-23 NOTE — PROGRESS NOTES
[facility] [place] at [address]  W: [phone]  F: [phone]    Reason for Evaluation:   Celia Phillips is a 80 y.o. female who is seen in consultation at the request of Dr. 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 History of Present Illness:   Celia Phillips is a pleasant 80 y.o. 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 Patient was transferred to [facility] for comminuted right femoral fracture c/b hematoma. On transfer, hemoglobin 5.3 downtrending from ~10 on admission, macrocytic, bilirubin progressively rising. CTA RLE with lobulated high density tissue within distal femoral shaft, possibly hemorragic metastatic disease, as well as hemorrhage in soft tissues around the fracture without active extravasation. Also with acute thrombus within right superficial femoral vein and popliteal veins. 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 INR on 12/19/22 1.8. Hospital course complicated by a fib with RVR. Interval History:   Patient still mumbling in room  Tachycardic, on 6 L NC   Fevers overnight with T max 100.6  Status post 2 units of PRBCs on 12/22 with good response in hemoglobin. Unfortunately, hemolysis labs worsening: LDH rising, indirect bilirubin 6.0, haptoglobin 8. Smear with severe macrocytic anemia and scattered nucleartd RBCs and spherocytes, likely due to complex anemia associated with hemolysis from cold agglutinin disease and acute blood loss from hematoma. Wound care evaluating for sacral ulcer    Review of systems was obtained and pertinent findings reviewed above. Past medical history, social history, family history, medications, and allergies are located in the electronic medical record.     Physical Exam:   Visit Vitals  /87 (BP 1 Location: Left upper arm, BP Patient Position: Semi fowlers)   Pulse (!) 117   Temp 99.3 °F (37.4 °C)   Resp 30   SpO2 94%     ECOG PS: 4  General: frail, cachetic, appears uncomfortable, moaning and mumbling, jaundiced  Eyes: +scleral icetrus  HENT: extremely dry mucous membranes,  Respiratory: normal respiratory effort  CV: tachycardic, irregular, systolic murmur, no peripheral edema  Ext: RLE in traction, +edema bilaterally  GI: soft, nontender, nondistended, no masses  Skin: no rashes, no ecchymoses, no petechiae  Neuro: not oriented     Results:     Lab Results   Component Value Date/Time    WBC 8.8 12/23/2022 03:24 AM    HGB 8.0 (L) 12/23/2022 03:24 AM    HCT 24.4 (L) 12/23/2022 03:24 AM    PLATELET 598 75/69/9691 03:24 AM    MCV 94.9 12/23/2022 03:24 AM    ABS. NEUTROPHILS PENDING 12/23/2022 03:24 AM     Lab Results   Component Value Date/Time    Sodium 139 12/23/2022 03:24 AM    Potassium HEMOLYZED,RECOLLECT REQUESTED 12/23/2022 03:24 AM    Chloride 112 (H) 12/23/2022 03:24 AM    CO2 28 12/23/2022 03:24 AM    Glucose 109 (H) 12/23/2022 03:24 AM    BUN 18 12/23/2022 03:24 AM    Creatinine 0.70 12/23/2022 03:24 AM    Calcium 8.2 (L) 12/23/2022 03:24 AM    Glucose (POC) 103 12/17/2022 03:46 AM     Lab Results   Component Value Date/Time    Bilirubin, total 7.8 (H) 12/23/2022 03:24 AM    ALT (SGPT) 28 12/23/2022 03:24 AM    Alk. phosphatase 82 12/23/2022 03:24 AM    Protein, total 5.7 (L) 12/23/2022 03:24 AM    Albumin 2.6 (L) 12/23/2022 03:24 AM    Globulin 3.1 12/23/2022 03:24 AM     Records from [facility] reviewed and summarized above. Test results above have been reviewed. Assessment:     #) Macrocytic anemia, multifactorial due to cold agglutinin disease and acute blood loss anemia from hematoma:  Hemoglobin precipitously down-trending from 10.8 on admission to 5.3 (12/22/22) with evidence of active hemolysis (haptoglobin 8, progressively rising bilirubin) due to cold agglutinin disease. Also with blood loss from hematoma in setting of femoral fracture.   No active extravasation on CTA. For cold agglutinin disease, treatment primarily involves avoidance of cold and supportive care with blood transfusions with warmed blood. For patients with severe anemia, plasmapheresis may be indicated to help remove cold agglutinins as a temporizing measure. Importantly, cold agglutinin disease is typically due to underlying disorder, frequently lymphoproliferative disorder or lymphoid malignancy (such as non hodgkin lymphoma or Waldenstrom macroglobulinemia) or autoimmune disorder. There is concern for possible osseous metastases as cause of patient's femoral fracture, which further increases the concern for underlying occult malignancy. She is not a good candidate for PLEX based on her age, frailty, co-morbidities, and volume overload (with anticipated volume shifts from PLEX). Therefore, recommend supportive care with warmed transfusions and warming blankets. Goals of care conversations ongoing with Palliative Care and family. Status post 2 units of PRBCs on 12/22 with good response in hemoglobin. Hemolysis labs worsening. Blood transfusion with warmer is providing only a temporizing measure. Prognosis remains overall very poor/guarded. #) Comminuted right femoral fracture, possibly due to osseous met of unknown primary, c/b hematoma: Patient is currently very high risk of perioperative cardiovascular and cerebrovascular complications. Currently not an operative candidate. No active extravasation on CTA of lower extremity from 12/22/2022. #) Acute proximal and distal DVT in patient with FVL:   Patient reportedly on coumadin at home. INR sub-therapeutic (1.8 on 12/19/22). 12/22/22 CTA of RLE with acute thrombus within the right superficial femoral vein and popliteal vein. Risk factors: a) Factor V Leiden, b) immobility. Not eligible for anticoagulation due to active hemorrhage and severe anemia. High risk of clot evolution/embolization.     Concern for PE as cause of hypoxia and tachycardia. Too unstable to travel for CTA. Hold off pending [address] conversations. #) A fib with RVR: Status post amiodarone drip. Management per cardiology. #) Alzheimer's dementia: aricept and namenda on hold    #) Protein-calorie malnutrition: patient refusing oral interventions. 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 She would not be eligible for any cancer-directed therapy given her debilitated state and ECOG 4. PLEX as a temporizing measure in the setting of untreated malignnacy would not provide any benefit given her frailty, co-morbidities, risk of decompensated heart failure (from volume shifts with PLEX), and inability to tolerate FFP (due to FVL and known acute thrombosis)    Supportive Care as follows:   Transfuse PRBCs with warmer to maintain hemoglobin >7  IVFs should be warmed   Recommend cold avoidance and keeping room at warm temperature. Consider warming blanket. Agree with holding anti-coagulation for acute DVT. Patient is at high risk of ongoing clot and embolism but would hold off on IVC filter given clinical instability  Would consider CTA chest to rule out PE especially given tachycardia and hypoxia; however, patient is unlikely to tolerate anti-coagulation and too unstable to travel; hold off pending GOC  Management of a fib with RVR per medicine/cardiology.    Hematology will continue to follow along    Signed By: Elda Loredo MD

## 2022-12-23 NOTE — PROGRESS NOTES
Brief Ortho Note:     - Pt overall prognosis is poor and there is no surgical procedure planned; medical service will update the Ortho service if and when patient is cleared for any procedure. Please call with questions. - Pt is confused today, responds to painful stimulus; Thigh swollen but soft; Foot warm, DP 2+. Unable to assess strength or sensation.   - Pt to remain in [place] traction for now; Check skin in boot twice daily for breakdown. Weight can be removed as needed for necessary medical procedures, but attempt to keep in traction at all times, even when turning. When turning, support the leg with an independent person to avoid excessive movement. Could apply a knee immobilizer to assist with this, but does not need to stay in place. I have discussed this with the nurse and med assistant at bedside, they verbalized understanding.      Signed By: SEVERINO Lucero     December 23, 2022

## 2022-12-23 NOTE — PROGRESS NOTES
[facility] [facility]  Hospitalist Group                                                                                          Hospitalist Progress Note  Angela Dia MD  Answering service: [phone] from in house phone        Date of Service:  2022  NAME:  Amina Crenshaw  :  1931  MRN:  [id number]      Admission Summary:   Amina Crenshaw is a 80 y.o. female who initially presented to [facility] after mechanical fall. Per report, patient's son states patient just slid down the wall and her foot twisted and angulated her leg. Patient with known history of Alzheimer's dementia and most history cannot be obtained. Work-up shows patient with comminuted and angulated distal right femoral diaphyseal fracture. Per signout from hospitalist, patient has been boarded at [facility] for a week as there is no orthopedic available during this week and having difficulty transferring to other facilities for orthopedic evaluation. Patient was finally accepted by orthopedic at [facility] and therefore transferred here for further evaluation. Patient also noted to have hematoma around her fracture femur and noted low hemoglobin requiring transfusion of total of 2 units PRBCs, last unit was transfused today. Patient does have iron deficiency at baseline. Patient also was noted to be jaundiced, per hospitalist their impression was probable resumption from hematoma causing jaundice. No liver function tests were available at this time. Patient also with known history of atrial fibrillation and noted to have RVR for which patient was started on IV Cardizem and continuing on that. Patient chronically takes Coumadin for her A. fib as well as factor V Leyden. Patient is currently heparin drip on that. Interval history / Subjective:   Fever overnight, infectious work-up started and abx therapy initiated.      Further discussions with patient's son today, he has agreed to Comfort Care Only Measures, and hospice has been consulted. Patient's son is reviewing consents overnight and will touch base with hospice in the morning. In the interim, all orders have been changed to comfort care. Assessment & Plan:     Patient is now comfort care only. Starting morphine 1mg q4h scheduled, plus prns. Acute anemia  History of Cold Agglutination   Active 5 Leiden     Jaundice  - Worsening hemolysis this morning, hemoglobin improved after transfusions yesterday     Popliteal DVT, right  - Noted on CTA, likely acute in setting of immobility from fracture, plus factor V leiden     Hypotension, stable  - May worsen with scheduled opioids, pt is comfort care      Right distal femoral fracture  - Defer surgery given high surgical risk, pt moving to comfort care only      Atrial fibrillation with RVR  -Stop antiarrythmics     Alzheimer's dementia  -Supportive care     Poor nutritional status   - Supportive care     Unstable sacral pressure wound  - Wound care, supportive care    Code status: DNR, comfort care only   Prophylaxis: HOLD given concern for bleed  Care Plan discussed with: family, nursing, consultant  Anticipated Disposition: >48h     Hospital Problems  Never Reviewed            Codes Class Noted POA    A-fib [facility]) ICD-10-CM: I48.91  ICD-9-CM: 427.31  12/21/2022 Unknown             Review of Systems:   A comprehensive review of systems was negative except for that written in the HPI. Vital Signs:    Last 24hrs VS reviewed since prior progress note.  Most recent are:  Visit Vitals  /76 (BP 1 Location: Left upper arm, BP Patient Position: Semi fowlers)   Pulse (!) 109   Temp 99.8 °F (37.7 °C)   Resp 24   SpO2 94%         Intake/Output Summary (Last 24 hours) at 12/23/2022 1850  Last data filed at 12/23/2022 1843  Gross per 24 hour   Intake 789.17 ml   Output 550 ml   Net 239.17 ml        Physical Examination:     I had a face to face encounter with this patient and independently examined them on 12/23/2022 as outlined below:          Constitutional:  In distress, repetitive wording, does not respond to questions   ENT:  Oral mucosa dry, black hairy tongue. NC in place   Resp:  CTA bilaterally. No wheezing/rhonchi/rales. No accessory muscle use. CV:  IRIR, tachycardiac, no murmurs, gallops, rubs    GI:  Soft, non distended, non tender    Musculoskeletal:  Warm, 2+ pulses throughout. RLE in immobilizer, with significant edema, significant pain to palpation     Neurologic:  Does not answer questions, moves arms spontaneously, face is symmetric             Data Review:    Review and/or order of clinical lab test  Review and/or order of tests in the radiology section of CPT  Review and/or order of tests in the medicine section of CPT      Labs:     Recent Labs     12/23/22 0324 12/22/22  0144   WBC 8.8 6.8   HGB 8.0* 5.3*   HCT 24.4* 16.0*    144*     Recent Labs     12/23/22  0324 12/22/22  0144 12/21/22  0340    146* 149*   K HEMOLYZED,RECOLLECT REQUESTED 3.5 4.1   * 116* 114*   CO2 28 28 27   BUN 18 22* 33*   CREA 0.70 0.60 0.82   * 130* 139*   CA 8.2* 9.0 9.1     Recent Labs     12/23/22  0324 12/22/22  0144   ALT 28 21   AP 82 76   TBILI 7.8* 3.8*   TP 5.7* 5.5*   ALB 2.6* 2.4*   GLOB 3.1 3.1     Recent Labs     12/21/22  1005   APTT 26.7      Recent Labs     12/22/22  1416   TIBC 104*   PSAT 30   FERR 631*      Lab Results   Component Value Date/Time    Folate 28.7 (H) 12/22/2022 02:16 PM      No results for input(s): PH, PCO2, PO2 in the last 72 hours. No results for input(s): CPK, CKNDX, TROIQ in the last 72 hours.     No lab exists for component: CPKMB  No results found for: CHOL, CHOLX, CHLST, CHOLV, HDL, HDLP, LDL, LDLC, DLDLP, TGLX, TRIGL, TRIGP, CHHD, CHHDX  Lab Results   Component Value Date/Time    Glucose (POC) 103 12/17/2022 03:46 AM     Lab Results   Component Value Date/Time    Color DARK YELLOW 12/23/2022 12:11 PM    Appearance CLEAR 12/23/2022 12:11 PM    Specific gravity 1.025 12/23/2022 12:11 PM    Specific gravity 1.023 12/16/2022 07:45 AM    pH (UA) 5.5 12/23/2022 12:11 PM    Protein 30 (A) 12/23/2022 12:11 PM    Glucose Negative 12/23/2022 12:11 PM    Ketone Negative 12/23/2022 12:11 PM    Bilirubin Small (A) 12/16/2022 07:45 AM    Urobilinogen 2.0 (H) 12/23/2022 12:11 PM    Nitrites Positive (A) 12/23/2022 12:11 PM    Leukocyte Esterase SMALL (A) 12/23/2022 12:11 PM    Epithelial cells FEW 12/23/2022 12:11 PM    Bacteria Negative 12/23/2022 12:11 PM    WBC 20-50 12/23/2022 12:11 PM    RBC 10-20 12/23/2022 12:11 PM         Medications Reviewed:     Current Facility-Administered Medications   Medication Dose Route Frequency    collagenase (SANTYL) 250 unit/gram ointment   Topical DAILY    balsam peru-castor oiL (VENELEX) ointment   Topical BID    morphine injection 2 mg  2 mg IntraVENous Q5MIN PRN    glycopyrrolate (ROBINUL) injection 0.2 mg  0.2 mg IntraVENous Q4H PRN    midazolam (VERSED) injection 1 mg  1 mg IntraVENous Q5MIN PRN    morphine injection 1 mg  1 mg IntraVENous Q4H    dextrose 5% infusion  10 mL/hr IntraVENous CONTINUOUS    [Held by provider] dilTIAZem ER (CARDIZEM CD) capsule 120 mg  120 mg Oral DAILY    [Held by provider] donepeziL (ARICEPT) tablet 5 mg  5 mg Oral QHS    [Held by provider] memantine (NAMENDA) tablet 5 mg  5 mg Oral BID    [Held by provider] heparin 25,000 units in D5W 250 ml infusion  12-25 Units/kg/hr IntraVENous TITRATE     ______________________________________________________________________  EXPECTED LENGTH OF STAY: 4d 2h  ACTUAL LENGTH OF STAY:          [address], [place]       CRITICAL CARE ATTESTATION:  I had a face to face encounter with the patient, reviewed and interpreted patient data including clinical events, labs, images, vital signs, I/O's, and examined patient.   I have discussed the case and the plan and management of the patient's care with the consulting services, the bedside nurses and necessary ancillary providers. NOTE OF PERSONAL INVOLVEMENT IN CARE   This patient has a high probability of imminent, clinically significant deterioration, which requires the highest level of preparedness to intervene urgently. I participated in the decision-making and personally managed or directed the management of the following life and organ supporting interventions that required my frequent assessment to treat or prevent imminent deterioration. I personally spent 30 minutes of critical care time. This is time spent at this critically ill patient's bedside actively involved in patient care as well as the coordination of care and discussions with the patient's family. This does not include any procedural time which has been billed separately.

## 2022-12-23 NOTE — PROGRESS NOTES
Speech pathology note  Reviewed chart and discussed case with RN. Note poor prognosis, hospice is being discussed, and patient remains inappropriate for PO consideration due to AMS. SLP will follow up 1 more time, however if patient remains too altered for PO intake, SLP will sign off. Agree with continued discussion regarding goals of care. Thank you.     Arsen Estrada., [facility]-SLP

## 2022-12-23 NOTE — HOSPICE
[facility]  [phone]     Patient Name: Jessie Gooden  YOB: 1931  Age: 80 y.o. [facility] RN Note:  Hospice consult received, reviewing chart. Will follow up with Unit Nurse and Care Manager to discuss plan of care, patient status and discharge disposition within the hour. Thank you for the opportunity to be of service to this patient. 16:45: Bedside briefly for assessment; patient moaning, appears restless, agitated and short of breath. Pt is alert, however, unable to determine orientation status. 17:00: Spoke with patient's son, Linda Bravo. 'Eddie\". Eddie is MPOA; per ACP documents. Eddie is with his step-father, Rodrigo Reddy. Robe Kava. Both Eddie and Queta Carmona state they are in agreement with Hospice plan of care. [facility] admits that pt is confused and has diagnosis of Alzheimer's. Review with Dr. Manolo Guerra who has provided verbal CTI for the hospice diagnosis of Acute Metabolic Encephalopathy    and has provided order to admit patient inpatient under GIP LOC. Eddie lives out of town, and is not planning to come to [facility]. Plan to complete consents via [employer] now. [email or web address]    18:00 waiting for hospice consents to be completed; then plan to admit patient inpatient hospice services. 18:30: Pt son, Eddie called Hospice RN; said that he has the consent forms and is reviewing them now. His main concern was if his mother has received medications for pain. Offered to round bedside. Pt has received dose of IV Morphine and appears to have relaxed. Her eyes are closed, however, she is frowning and grimacing. Plan to review current plan of care with Dr. Leeanne Layton. PLAN: Pt son requests time to review hospice consents and DDNR. At his request, Hospice RN will contact son in the am Saturday 12/24/2022 to review with plan to admit patient into Hospice services at that time.         Jacquelyn Monique, RN, [place] [address] Nurse Liaison  [phone] [employer]  [phone] Office  Available on [employer]

## 2022-12-23 NOTE — PROGRESS NOTES
CM received hospice consult for GIP admission. Sent referral to [address] [place] [address] for review.     Reji Reyes, MPH  Care Manager [facility]  Available via [facility] or

## 2022-12-23 NOTE — PROGRESS NOTES
0730: Bedside and Verbal shift change report given to Chery RN (oncoming nurse) by Aram Wolf RN (offgoing nurse). Report included the following information SBAR, Kardex, MAR, and Cardiac Rhythm A. fib . Rate verified Amio gtt and D5.     0945: Pt LUE edematous and red d/t prior IV infiltration, MD Shukla at bedside and aware. No new orders at this time. 1036: Reached out to MD Alexa Santana to clarify if pt still required CT. Per MD Alexa Santana hold off on CT for now. 1211: Paired blood cultures obtained by Mayda Leary RN. Hogan removed and new hogan placed for urinalysis order per MD Alexa Santana. New hogan placed by Mayda Leary RN per MD Alexa Santana. 1600: Palliative team notified this RN of transitioning to comfort care and hospice. New orders received. 1930: Bedside and Verbal shift change report given to SARATH Craig (oncoming nurse) by Anthony Mathews RN (offgoing nurse). Report included the following information SBAR, Kardex, MAR, and Cardiac Rhythm A. fib . Care Plan:   Problem: Falls - Risk of  Goal: *Absence of Falls  Description: Document Rene Fall Risk and appropriate interventions in the flowsheet.   Outcome: Progressing Towards Goal  Note: Fall Risk Interventions:     Mentation Interventions: Adequate sleep, hydration, pain control, Bed/chair exit alarm, Door open when patient unattended, Evaluate medications/consider consulting pharmacy, Increase mobility, More frequent rounding, Reorient patient, Room close to nurse's station, Toileting rounds, Update white board    Medication Interventions: Bed/chair exit alarm, Patient to call before getting OOB, Evaluate medications/consider consulting pharmacy, Teach patient to arise slowly    Elimination Interventions: Bed/chair exit alarm, Call light in reach, Patient to call for help with toileting needs, Stay With Me (per policy), Toileting schedule/hourly rounds    History of Falls Interventions: Bed/chair exit alarm, Consult care management for discharge planning, Door open when patient unattended, Evaluate medications/consider consulting pharmacy, Room close to nurse's station      Problem: Patient Education: Go to Patient Education Activity  Goal: Patient/Family Education  Outcome: Progressing Towards Goal

## 2022-12-23 NOTE — PROGRESS NOTES
Pharmacist Note - Vancomycin Dosing    Consult provided for this 80 y.o. female for indication of sepsis. - debility  Antibiotic regimen: Vanco    Recent Labs     22  0324 22  0144 22  1005 22  0340   WBC 8.8 6.8 9.8 8.7   CREA 0.70 0.60  --  0.82   BUN 18 22*  --  33*     Frequency of BMP: daily thru   Height: 170.2 cm  Weight: 60.8 kg  Est CrCl: 50 ml/min; UO: - ml/kg/hr  Temp (24hrs), Av.8 °F (37.1 °C), Min:98 °F (36.7 °C), Max:100.6 °F (38.1 °C)    Cultures:  No recent    The plan below is expected to result in a target range of AUC/ELIZABETH 400-600    Therapy will be initiated with a loading dose of 1500 mg IV x 1 to be followed by a maintenance dose of 1000 mg IV every 24 hours. Pharmacy to follow patient daily and order levels / make dose adjustments as appropriate. *Vancomycin has been dosed used Bayesian kinetics software to target an AUC/ELIZABETH of 400-600, which provides adequate exposure for an assumed infection due to MRSA with an ELIZABETH of 1 or less while reducing the risk of nephrotoxicity as seen with traditional trough based dosing goals.

## 2022-12-24 PROBLEM — G93.41 METABOLIC ENCEPHALOPATHY: Status: ACTIVE | Noted: 2022-01-01

## 2022-12-24 NOTE — HOSPICE
[facility]  [phone]    Inpatient Nursing Admission   Patient Name: Vaibhav Will  YOB: 1931  Age: 80 y.o. Date of Hospice Admission: 12/24/2022  Hospice Attending Elected by Patient: Elizabeth Alan MD  Primary Care Physician: None  Admitting RN: Minal Malin  : Mario Seo     Level of Care (GIP/Routine/Respite): GIP  Facility of Care: [facility]  Patient Room: 452/01     HOSPICE SUMMARY   ER Visits/ Hospitalizations in past year: 1  Hospice Diagnosis: Metabolic encephalopathy [A16.08]  Onset Date of Hospice Diagnosis: 12/15/22  Summary of Disease Progression Leading to Hospice Diagnosis:   Per MD notes:   Vaibhav Will is a 80 y.o. female who presented as a transfer from [facility] for R distal femoral fracture requiring evaluation by orthopedic surgery. However, upon arrival patient was found to be a poor surgical candidate due to multiple risk factors including worsening anemia, poor nutritional status, unstable vital signs. Patient received 2U of pRBC for significant anemia of 5.3 during admission. Given patient's poor prognosis, palliative care was consulted and hospice was recommended to patient's family. Patient's son, Lan, ultimately decided to pursue comfort care only measures on 12/23. Patient was transferred to inpatient hospice on 12/24.       Co-Morbidities:   Patient Active Problem List   Diagnosis Code    Femur fracture ([facility] [address] S72.90XA    Atrial fibrillation, chronic (HCC) I48.20    Alzheimer's dementia ([facility] [address] G30.9, F02.80    Femoral fracture ([facility] [address] S72.90XA    A-fib ([facility] [address] L73.14    Metabolic encephalopathy X40.31     Diagnoses RELATED to the terminal prognosis: Femur fracture, DVT, Afib  Other Diagnoses: Dementia, Anemia, Factor V Leiden, sacral pressure wound    Rationale for a prognosis of life expectancy of 6 months or less if the disease follows its normal course (Disease Specific History):     Vaibhav Will is a 80 y.o. who was admitted to [facility]. The patient's principle diagnosis of Metabolic Encephalopathy has resulted in pain, agitation, dyspnea. Functionally, the patient's Palliative Performance Scale has declined over a period of several days and is estimated at 10%. Objective information that support this patients limited prognosis includes:     CT R-Thigh 12/22/22:    FINDINGS: There is a comminuted fracture of the middle and distal shaft of the  right femur. There is slight overriding with apex posterior angulation. There is  lobulated high density distally within the medullary canal. This most likely  represents hemorrhage. Neoplasm is felt to be less likely. There is no bone  destruction. Right hip is been replaced. No dislocation     There is soft tissue swelling surrounding the fracture compatible with  posttraumatic hemorrhage. Active extravasation is not identified     Diffuse edema in the subcutaneous tissues. There are extensive vascular  calcifications. There are multiple filling defects with the superficial femoral  vein and popliteal vein compatible DVT     IMPRESSION  1. Hemorrhage in the soft tissues surrounding the fracture. . No active  extravasation  2. Lobulated high density tissue within the distal femoral shaft. This is  slightly removed from the fracture. This may represent hemorrhage metastatic  disease is difficult to exclude. MR imaging is recommended to confirm  3. Acute thrombus within the right superficial femoral vein and popliteal vein  (DVT)    The patient/family chose comfort measures with the support of Hospice.     Patient meets for GIP LOC as evidenced by     Prognosis estimated based on 12/24/22 clinical assessment is:   [] Few to Many Hours  [x] Hours to Days   [] Few to Many Days   [] Days to Weeks   [] Few to Many Weeks   [] Weeks to Months   [] Few to Many Months    ASSESSMENT    Patient self-reports:  []  Yes    [x] No    SYMPTOMS: pain, agitation, dyspnea, delirium    SIGNS/PHYSICAL FINDINGS: Ill-appearing elderly female minimally responsive to tactile stimuli, garbled speech at times. SOB on 2L NC, mouth breathing, lung sounds + rhonchi, expiratory wheezing. HR-140 and irregularly Afib. Mild agitation, restlessness. Right neck triple lumen IJ. Mederos in place. Right leg immobilized and in traction. Edema 3+ RLE, sacral pressure ulcer, skin jaundiced. KARNOFSKY: 10%    FAST for all dementia:      Learning Assessment:  Patient  Is patient willing/able to learn? no  What is the highest level of education completed? Learning preference (written material, demonstration, visual)? Learning barriers (ESOL, [place], poor vision)? Caregiver  Is caregiver willing to learn care for patient? What is the highest level of education completed? Learning preference (written material, demonstration, visual)? Learning barriers (ESOL, [place], poor vision)? CLINICAL INFORMATION     Wt Readings from Last 3 Encounters:   12/19/22 60.8 kg (134 lb)     Ht Readings from Last 3 Encounters:   12/20/22 5' 7\" (1.702 m)       There is no height or weight on file to calculate BMI. Visit Vitals  /60 (BP 1 Location: Left upper arm, BP Patient Position: At rest)   Pulse 99   Temp 99.5 °F (37.5 °C)   Resp 23   SpO2 (!) 88%       LAB VALUES  No results found for this visit on 12/24/22 (from the past 12 hour(s)). No results found for this visit on 12/24/22 (from the past 6 hour(s)). Lab Results   Component Value Date/Time    Protein, total 5.7 (L) 12/23/2022 03:24 AM    Albumin 2.6 (L) 12/23/2022 03:24 AM       Currently this patient has:  [x] Supplemental O2 [] Peripheral IV  [x] IJ    [] PORT   [x] Mederos Catheter [] NG Tube   [] PEG Tube [] Ostomy    [] AICD: Has ICD been deactivated? [] Yes [] No:______    PLAN     1. Admit to [facility] for symptom management of pain, dyspnea, restlessness. 2. Start Morphine IV 2mg every 4 hours with PRN available.   3. Start Versed IV 2mg every 4 hours with PRN available. 4. Comfort order set for breakthrough symptoms. 5. Maintain right leg immobilized in traction for comminuted hip fracture. 6. Mouth care, swabs as needed. 7. Support and educate family at bedside, call daily if not. 8.  and MSW visits as needed for emotional, spiritual support. Hospice Team Frequency Orders:  Skilled Nurse -  Daily x 7 days x 7 days with 5 PRN visits for symptom control. MSW - 1 visit for initial assessment/evaluation for family support and need for volunteer services. Patsy Durand - 1 visit for initial assessment/evaluation for spiritual support. ADVANCE CARE PLANNING (Complete in ACP Flow Sheet)   Code Status: DNR  Durable DNR: [x]  Yes  []  No  Code Status Discussed/Confirmed:  Preference for Other Life Sustaining Treatment Discussed/Confirmed:  Hospitalization Preference:  (ex. Patient would like to receive end of life care in the hospital, in a facility, at home etc.)  Advance Care Planning 12/15/2022   Confirm Advance Directive Yes, on file   Does the patient have other document types Power of [employer]: [] Yes  []  No      [] Unknown  Appropriate for Pinning Ceremony:  [] Yes     [] No  Quaker: Mormon   [facility]     1. Discharge Plan: Remain inpatient as not stable for transfer. 2. Patient/Family teaching: Signs and symptoms of end of life, medications to manage. 3. Response to patient/family teaching: Reaching acceptance, grieving complicated by holidays, distance from home. SOCIAL/EMOTIONAL/SPIRITUAL NEEDS     Spiritual Issues Identified:  visit ordered for family support    Psych/ Social/ Emotional Issues Identified: Pt is  to [facility] for 43 years. She has one son Chip and a step-daughter who passed away a year ago. 5 grandchildren. Pt was an RN for many years. Received much comfort from her checo.     Caregiver Support:  [x] Provided information on End of Life Care   [x] Material Provided: G[facility] From My [facility]ight or Johnny Mendoza contacted, discharge to hospice order received  Dr. Ken Nunez contacted, agrees to serve as attending provider for hospice and provided verbal certification of terminal illness with life expectancy of 6 months or less. Orders for hospice admission, medications and plan of treatment received. Medication reconciliation completed. MEDS: See medication list below  DME: Per hospital  Supplies: Per hospital  IDT communication to include MD, SN, SW, CH and support team    ALLERGIES AND MEDICATIONS     Allergies:    Allergies   Allergen Reactions    Penicillins Anaphylaxis     Current Facility-Administered Medications   Medication Dose Route Frequency    acetaminophen (TYLENOL) suppository 650 mg  650 mg Rectal Q4H PRN    glycopyrrolate (ROBINUL) injection 0.2 mg  0.2 mg IntraVENous Q4H PRN    bisacodyL (DULCOLAX) suppository 10 mg  10 mg Rectal DAILY PRN    morphine injection 2 mg  2 mg IntraVENous Q15MIN PRN    midazolam (VERSED) injection 2 mg  2 mg IntraVENous Q15MIN PRN    morphine injection 2 mg  2 mg IntraVENous Q4H    saline peripheral flush soln 5 mL  5 mL InterCATHeter PRN

## 2022-12-24 NOTE — PROGRESS NOTES
0730: Bedside and Verbal shift change report given to SARATH Gottlieb (oncoming nurse) by Romi Waldron RN (offgoing nurse). Report included the following information SBAR, Kardex, MAR, and Cardiac Rhythm A. fib .     1000: Pt transitioned to hospice services. 1930: Bedside and Verbal shift change report given to Kiara RN (oncoming nurse) by Jhoana Kolb RN (offgoing nurse). Report included the following information SBAR, Kardex, MAR, and Cardiac Rhythm A. fib .          Care Plan:   Problem: Breathing Pattern - Ineffective  Goal: *Use of effective breathing techniques  12/24/2022 1559 by Ariadne Mccullough RN  Outcome: Progressing Towards Goal  12/24/2022 1558 by Ariadne Mccullough RN  Outcome: Progressing Towards Goal       Problem: Pain  Goal: *Control of acute pain  12/24/2022 1559 by Ariadne Mccullough RN  Outcome: Progressing Towards Goal  12/24/2022 1558 by Ariadne Mccullough RN  Outcome: Progressing Towards Goal       Problem: Pressure Injury - Risk of  Goal: *Prevention of pressure injury  12/24/2022 1559 by Ariadne Mccullough RN  Outcome: Progressing Towards Goal  Note: Pressure Injury Interventions:   Pressure injury risk assessment   tool  Pressure-relieving device needs   assessment  Pressure-relieving device implementation (eg: Specialty beds; wheel chair cushions; heel lift   boots)  Skin assessment (e.g. existing ulcers, color; integrity; moisture; nelson prominences; blisters; friction/shear   injuries)  Skin monitoring and care (e.g. skin cleansing; keep dry, moisturize, utilization of lotion and/or skin barrier   cream)  Blood glucose control (eg: Monitoring; medication;   nutrition/hydration)  Position change (eg: Techniques to position; turn and transfer per schedule; cushion nelson prominences; float heels; encourage   mobility)  Nutrition promotion (eg: Micro/macro nutrient supplementation; encourage oral intake; blood glucose control; nutrition support when   indicated)      12/24/2022 1558 by Lisette Harry RN  Outcome: Progressing Towards Goal  Note: Pressure Injury Interventions:   Pressure injury risk assessment   tool  Pressure-relieving device needs   assessment  Pressure-relieving device implementation (eg: Specialty beds; wheel chair cushions; heel lift   boots)  Skin assessment (e.g. existing ulcers, color; integrity; moisture; nelson prominences; blisters; friction/shear   injuries)  Skin monitoring and care (e.g. skin cleansing; keep dry, moisturize, utilization of lotion and/or skin barrier   cream)  Blood glucose control (eg: Monitoring; medication;   nutrition/hydration)  Position change (eg: Techniques to position; turn and transfer per schedule; cushion neslon prominences; float heels; encourage   mobility)  Nutrition promotion (eg: Micro/macro nutrient supplementation; encourage oral intake; blood glucose control; nutrition support when   indicated)      Problem: Grieving  Goal: *Able to express feelings of grief  12/24/2022 1559 by Leighann Raymond, RN  Outcome: Progressing Towards Goal  12/24/2022 1558 by Leighann Raymond, RN  Outcome: Progressing Towards Goal  Goal: *Able to identify stages of grieving process  12/24/2022 1559 by Leighann Raymond, RN  Outcome: Progressing Towards Goal  12/24/2022 1558 by Leighann Raymond, RN  Outcome: Progressing Towards Goal       Problem: Mood - Altered  Goal: *Alleviation of anxiety and depressive symptoms  12/24/2022 1559 by Leighann Raymond, RN  Outcome: Progressing Towards Goal  12/24/2022 1558 by Leighann Raymond, RN  Outcome: Progressing Towards Goal       Problem: Patient Education: Go to Patient Education Activity  Goal: Patient/Family Education  12/24/2022 1559 by Leighann Raymond, RN  Outcome: Progressing Towards Goal  12/24/2022 1558 by Leighann Raymond RN  Outcome: Progressing Towards Goal

## 2022-12-24 NOTE — PROGRESS NOTES
responded to a page for Ms. Trujillo Alt  for hospice. Staff shared son was at bedside. When  arrived patient was resting and  did not wake. No family was at bedside at that time. I was told they stepped away for min. Advised nurse to contact [facility] for any further referrals.     Chaplain Jama Villa MDiv, [facility]

## 2022-12-24 NOTE — PROGRESS NOTES
1930: Bedside and Verbal shift change report given to SARATH Craig (oncoming nurse) by Dimitrios Lee RN (offgoing nurse). Report included the following information SBAR.

## 2022-12-24 NOTE — PROGRESS NOTES
This LMSW conducted the initial social work assessment. Family reported that they are doing ok. They live about 1.5 hours from the hospital and the son has been staying with his mom. They are relying one another. When asked if they needed any resources for the grandchildren and great grandchildren, they stated they will be ok. Social work will continue to follow and provide support as needed.

## 2022-12-24 NOTE — PROGRESS NOTES
SLP Contact Note    Noted pt and family pursuing hospice, which appears very reasonable. Please allow pt her favorite flavors and any requested items, but do not force feed. Would not provide thickened liquids unless this is specifically more comfortable for the patient. No further SLP needs.       Thank you,  VERN ValdesEd, [id number] [facility]  Speech-Language Pathologist

## 2022-12-24 NOTE — HOSPICE
[facility]  [phone]    Social Work Admission Note  Patient Name: Akbar Jeff  YOB: 1931  Age: 80 y.o. Date of Visit: 22  Facility of Care: [facility]  Patient Room: [facility]     Hospice Attending: Gordo Earl MD  Hospice Diagnosis: Metabolic encephalopathy [F57.50]    Level of Care:    [x]  GIP    []  Respite   []  Routine    Consents/NCD Documentation:     Consents Reviewed:   []  Yes  [x]  No, completed by other hospice staff member. Person Reviewed/Signed with:  []  Patient   []  Pts NOK/MPOA  Name:     Right to NCD Reviewed:   []  Yes  [x]  No, completed by other hospice staff member. NCD Requested:   []  Yes  []  No    Admission Nurse/Intake Notified NCD was requested:  []  Yes  []  No  []  Not requested    Planned Start of Care Date: 2022    Hospice Witness Representative: Laurie Galvan SW conducted the initial social work assessment for [employer]. Vessie Brittle, GIP at [facility]. The assessment was conducted via three-way call with the patient's son, Freddy Kirk and her  Chel Amaya. Prior to the call, LMSW stopped by a visited with the patient in the room. She was resting comfortably and unresponsive. Both the son and  live about 1.5 hours away and had gone home for the afternoon. They shared that the patient was born and raised in [address]. She had two siblings, Karena Tan and Ana Busby. Both have passed. 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 When he got a contract from the [employer] to teach people how to drill for water on the [address], they moved to [place] (around [address] [facility]). Her first   of cancer. She later met her current , Sandeep Shah. They have been together 44 years. The patient's son was born in  (he was the first [place] baby born in the [place] [address]). He shared how the patient had her picture in the paper with her beehive hair.  He also had a half sister who passed away about a year ago. The patient was a nurse. She worked for [employer]. They had a safety and health area. She was also a  for [employer]. She retired from [employer] in [address] [place] [address] and then worked for [employer]. She retired from there around . She worked part time until she was 68years old. She met her  through a mutual friend, and they . They enjoyed going to the beach and camping. She made a lot of friends and also enjoyed deep sea fishing with Penny Vidal. He said that she is the only one who caught a [employer]; he had only ever caught [facility] [place]. The family shared that she also enjoyed [employer] [place] was her favorite) and went to many of the races. They also said she loved her six grandchildren and 1 great grandchild. They are a very close family. The stated there are no mental health or substance use concerns. They were members of a newly merged [employer]. They have not been active for a while. She was an active member and very involved in the [employer] LMSW shared about hospice services and support. They expressed no other needs at this time. [place] [address] in [place] to serve.      ADVANCE CARE PLANNING    Advance Directive:  [x]  Yes  []  No   []  Would like to complete  Primary MPOA: Eddie Marie, jalen  Secondary MPOA:   LNOK:   Code Status: DNR  Durable DNR: _ Yes  X No  Advance Care Planning 12/15/2022   Confirm Advance Directive Yes, on file   Does the patient have other document types Power of        Relationship Status:  []  Single     [x]        []      []  Domestic Partner     []  /  []  Common Law  []    []  Unknown    If in a relationship, name of partner/spouse:  Duration of relationship:    Buddhism/Spirituality: Lutheran  [x]  Active In Buddhism/Spirituality   []  Not Active   []  N/A  Notes:     Home: [address] Provided:  []  [employer]C  []  Information on applying for disability  []  FMLA Paperwork  []  Letters Requested by Family   []  [address]  []  Final Arrangements Resources   []  Outside counseling services (individual or group therapy)  []  Grief resources   []  Gene Krueger  []  [employer]   []  [address]   []  [facility] [employer]   []  Referral Sent to [employer]   []  Referral Sent to Music Therapy   []  Referral Sent to Pet Therapy  []  Other  Social Work Initial Assessment     Sex:  female    Pronoun Preference:   [x]  He/Him/His   []  She/Her/Hers   []  They/Them/Theirs   []   Patient Name   []   Decline to Answer  []   Unknown  []   Other   Notes:     Race/Ethnicity: (aubrey all that apply)  []  American [place] ([facility]) or [place] Native  []    []  Black or [place] American  []   or   []  [address] [place] [address] or [facility]  [x]  White  []  Unknown  []  Other     Inpatient Financial Agreement Completed:   [x]  Yes  []  No    Insurance:   [x]  Medicare   []  Medicaid     []  [employer]    []  Self-Pay/Uninsured   Notes:      Has pt applied for FAP? []  Needs to Apply  []  Application Completed and Submitted   []  Approved/ Expiration Date:   [x]  N/A  Notes:     Has pt applied for Medicaid? []  Needs to Apply  []  Application Completed and Submitted/Application Number:   [x]  N/A  Notes:     Has a long term care screening ([facility]) been requested? []  Requested   []  Not Requested, Needs follow up  []  Completed  [x]  N/A  Notes:     Does the pt have a long term care insurance policy? []  Yes  [x]  No  []  Yes, Needing assistance with paperwork  Notes:      Service:    []  Yes   [x]  No       []  Unknown    Appropriate for Pinning Ceremony:   []  Yes      [x]  No    Is patient using VA benefits? []  Yes      [x]  No  []  Needs assistance with accessing benefits  Notes:       Work History:   [x]  Full-Time/Part-Time  []  Retired   []  Other  Notes: Primary Language: English  []   Needed  []   utilized during visit  []   Waived/ form completed    Ability to express thoughts/needs/feelings  []  Expressed thoughts/feelings/needs without difficulty  []  Requires extra time and cuing  []  Speech limited single words  []  Uses only gestures (eye, blinking eye or head movement/pointing)  []  Unable to express thoughts/feelings/needs (speech unintelligible or inappropriate)  [x]  Unresponsive  Notes:      Mental Status:  []  Alert-oriented to:     []  Person     []  Place     []  Time  []  Comatose-responds to:    []   Verbal stimuli    []  Tactile stimuli    []  Painful stimuli  []  Forgetful  []  Disoriented/Confused  []  Lethargic  []  Agitated  [x]  Unresponsive  []  Other (specify):    Notes:      Patients description of Illness/Current Health Status:    [x]  Patient unable to discuss  []  Patient unwilling to discuss  []  (Specify)        Knowledge/Understanding of Disease Process  Patient:    []  Demonstrates knowledge/understanding of disease process   []  Demonstrates knowledge/understanding of treatment plan   []  Demonstrates knowledge/understanding of prognosis   []  Demonstrates acceptance of prognosis   []  Demonstrates knowledge/understanding of resuscitation status   []  Other (specify)  Caregiver:   [x]  Demonstrates knowledge/understanding of disease process   [x]  Demonstrates knowledge/understanding of treatment plan   [x]  Demonstrates knowledge/understanding of prognosis   [x]  Demonstrates acceptance of prognosis   [x]  Demonstrates knowledge/understanding of resuscitation status   []  Other (specify)  Notes:      Patients living arrangement/care setting:  Use the PRIOR COLUMN when the PATIENTS current health status necessitated a change in his/her primary residence.     Prior Current Response              []             []    Patients own home/residence              []             []    Home of family member/friend              []             []    Boarding home/Group Home              []             []    Assisted living facility/senior care center              [x]             []    Hospital/Acute care facility              []             []    Skilled nursing facility              []             []    Long term care facility/Nursing home              []             [x]    Hospice in Patient      Primary Caregiver:  []  No Primary Caregiver  Name of Primary Caregiver: Eddie Marie  Primary Caregiver Phone Number: [phone]  Relationship or Primary Caregiver: son   []  Spouse/Significant other       [x]  Child        []  Step child       []  Sibling   []  Parent   []  Friend/Neighbor   []  Community/Scientology Volunteer   []  Paid help   []  Other (specify):___________  Notes:       Family members/Significant others:  Name: Clive Alston   Relationship:spouse   Phone Number:  Actively involved in care? [x]  Yes  []  No    Name:  Relationship:  Phone Number:  Actively involved in care?   []  Yes  []  No    Social support systems: (select ONE best description)  [x]  Excellent social support system which includes three or more family members or friends  []  Good social support system which includes two or less members or friends  []  [address] support which includes one family member or friend  []  Poor social support; no family members or friends; basically ALONE  Notes:      Emotional Status: (aubrey all that apply)    Patient Caregiver Response                 []                [x]    Mood/Affect stable and appropriate                   []                []    Angry                 []                []    Anxious                 []                []    Apprehensive                 []                []    Avoidant                 []                []    Clinging                 []                []    Depressed                 []                []    Distraught                 []                []    Fearful []                []    Flat Affect                 []                []    Helpless                 []                []    Hostile                 []                []    Impulsive                 []                []    Irritable                 []                []    Labile                 []                []    Manic                 []                []    Restlessness                 []                [x]    Sad                 []                []    Strain/Stress                 []                []    Suspicious                 []                []     Tearful                 []                 []     Withdrawn          Notes:     Coping Skills (strengths/weakness):    Patient: Coping Skills (strength/weakness): Blanca, nurse   Family/caregiver (strength/weakness): Strong support system     [place] of care upon discharge (aubrey all that apply):     [x]  No burden evident   []  Family must administer medications   []  Illness causing financial strain   []  Family/Support feels overwhelmed   []  Family/Support sleep disturbed with patients care   []  Patients care causes extra physical stress  of death  []  Illness causes changes in family lifestyle  []  Illness impacting family/support employment  []  Family experiencing increased time demands  []  Patients behavior endangers family  []  Denial of patients illness  []  Concern over outcome of illness/fear  []  Patients behavior embarrassing to family   Notes:      Risk Factors: (aubrey all that apply):    [x]  No burden evident   []  Alcohol abuse  []  Financial resources inadequate to meet basic needs (food/house/etc)  []  Financial resources inadequate to meet health care needs (supplies/equipment/medications)  []  Food/nutrition resources inadequate  []  Home environment unsafe/inadequate for home care  []  Homicidal risk  []  Lives alone or without concerned relatives  []  Multiple medications/complex schedule  []  Physical limitations increase likelihood of falls  []  Plan of care/treatments complicated  []  Substance use/abuse  []  Suicidal risk  []  Visual impairment threatens safety/ability to perform self-care  []  Other (specify):     Abuse/Neglect (actual/potential risks):  [x]  No signs of abuse/neglect  []  History of abuse/neglect                 []  Physical          []  Sexual  []  History of domestic violence  []  Lacks adequate physical care  []  Lacks emotional nurturing/support  []  Lacks appropriate stimulation/cognitive experiences  []  Left alone inappropriately  []  Lacks necessary supervision  []  Inadequate or delayed medical care  []  Unsafe environment (i.e guns/drug use/history of violence in the home/etc.)  []  Bruising or other physical signs of injury present  []  Refer to child/adult protective services  []  Other (specify):   Notes:      Current Sources of Stress (in Addition to Current Illness):   [x]  None reported  []  Bills/Debt    []  Career/Job change    []   (short term)    []   (long term)    []  Death of a child (recent)    []  Death of a parent (recent)   []  Death of a spouse (recent)   []  Employment status changed   []  Family discord    []  Financial loss/Inadequate inther (specify):come  []  Job loss  []  Legal issues unresolved  []  Lifestyle change  []  Marital discord  []  Marriage within the last year  []  Paperwork (insurance/legal/etc) overwhelming  []  Separation/Divorce  []  Other (specify):  Notes:       Interventions/Plan of Care   []  MSW will assess social and emotional factors related to coping with end of life issues  []  MSW will provide community resource planning/referral   []  MSW to assist with relocation to different care setting if/when symptoms stabilize  [x]  Pt/Pts family will receive emotional support. []  Pt/Pts family will share the details surrounding the pts disease progression  []  Pt/Pts Family will show expression of grief by participating in life review.   [] Pt/Pts Family will be educated and able to verbalize understanding of mental, emotional, and physical symptoms of grief. []  Pt/Pts Family will be educated and able to identify skills and social support to help cope with grief. []  Pts family will receive support for grief with emphasis on developmentally appropriate language.   [] Other:   Notes:       Discharge Planning   [x]  Home with family member    []  Return back to nursing home/facility  []  Needs assistance with placement/paid caregivers  []  Short Stay under routine level of care at [facility]   []  Other  Notes:     MSW Assessment Completed by: Mishel Herrera LMSW  12/24/22    Time In:3130       Time Out:6986

## 2022-12-24 NOTE — PROGRESS NOTES
Problem: Falls - Risk of  Goal: *Absence of Falls  Description: Document Meghan Fitzgerald Fall Risk and appropriate interventions in the flowsheet.   Outcome: Progressing Towards Goal  Note: Fall Risk Interventions:       Mentation Interventions: Adequate sleep, hydration, pain control, Bed/chair exit alarm, Reorient patient, Room close to nurse's station    Medication Interventions: Bed/chair exit alarm    Elimination Interventions: Bed/chair exit alarm, Call light in reach, Toileting schedule/hourly rounds    History of Falls Interventions: Bed/chair exit alarm, Door open when patient unattended, Room close to nurse's station

## 2022-12-24 NOTE — HOSPICE
[facility]  [phone]    Nursing Note   Patient Name: Vishal Marin  YOB: 1931  Age: 80 y.o. [facility] RN Note:  Chart reviewed. Plan of care discussed with patients nurse. Pt will be admitted inpatient hospice for pain. Tavo Morgan has signed consents via [facility]. Dr. Destiny Duckworth contacted for discharge order. Dr Meghan Adhikari gave CTI and medication orders. Son is at bedside and will plan to remain. FH: Wendi in [place], [place].     [facility]y [facility]elE[facility]  [phone]

## 2022-12-24 NOTE — H&P
[facility]  [phone]    Patient Name: Tim Lechuga  YOB: 1931    Date of Provider Hospice Visit: 12/24/22    Level of Care:   [x] General Inpatient (GIP)    [] Routine   [] Respite    Current Location of Care:  [x] [facility] [] [facility] [] [facility] [] [facility] [] [facility]ospice [facility], patient referred from:  [] [facility] [] [facility] [] [facility] [] [facility] [] Home [] Other:     Date of Original Hospice Admission: 12/24/22  Hospice Medical Director at time of admission: [address] [place] [address] Diagnosis: Acute  Metabolic Encephalopathy   Diagnoses RELATED to the terminal prognosis: Right femur fracture, DVT right popliteal, Afib  Other Diagnoses: Alzheimer's Dementia, Anemia, Factor V Leiden, history of cold agglutinin disease, sacral pressure wound        HOSPICE SUMMARY   Do not cut and paste chart information other than imaging findings    Tim Lechuga is a 80y.o. year old who was admitted to [facility] inpatient with principle diagnosis of Acute  Metabolic Encephalopathy. Pt initially admitted with right distal femoral fracture requiring evaluation by orthopedic surgery however was found to be a poor surgical candidate due to multiple risk factors including worsening anemia, poor nutritional status, & unstable vital signs. Pt was noted to have hematoma around her fracture femur and low hemoglobin requiring transfusion of PRBC. She has iron deficiency at baseline. Patient also was noted to be jaundiced. Pt with known history of atrial fibrillation and now with  RVR for which she was started on IV Cardizem. Patient chronically takes Coumadin for her A. fib as well as factor V Leyden & now was managed with heparin drip. She was also found to have right popliteal DVT likely acute in setting of immobility from fracture, plus factor V leiden. She has an unstageable sacral pressure wound requiring wound care. Her over all prognosis is poor.  Family decided to stop aggressive interventions ad focus on managing her pain and other symptoms causing discomfort. PPS 10%       HOSPICE DIAGNOSES   Active Symptoms:  1. Labored breathing  2. Decreased responsiveness  3  Restlessness/agitation  4. Non verbal pain  5. Sacral wound  6. Fracture right leg/distal femur; in immobilizer  7. Hospice care     PLAN   Admit pt to [facility] as she is medically unstable, requiring close monitoring and active management of symptoms  Versed 2mg IV scheduled every 4 hours and every 15mts as needed  Morphine 2mg IV scheduled every 4 hours and every 15mts as needed  Robinul 0.2mg IV every 4 hours as needed  Other comfort meds as needed  O2 for comfort  Mederos's catheter care  No family at bedside at this time. Son was here earlier and had met with hospice liaison and is in agreement with hospice plan of care. He will return later this evening.  and SW to support family needs  Disposition: Likely will pass here  Hospice Plan of care was reviewed in detail and agree with current plan of care    Prognosis estimated based on 12/24/22 clinical assessment is:   [x] Hours to Days    [] Days to Weeks    [] Other:    Communicated plan of care with: Hospice Case Manager; Hospice IDT; Care Team     GOALS OF CARE     Patient/Medical POA stated Goal of Care: comfort    [] I have reviewed and/or updated ACP information in the Advance Care Planning Navigator. This information is available in the [address] link in the patient's chart header. Primary Decision Maker ([address]):   Primary Decision Maker (Active):  Merlin Apple - [phone]    Resuscitation Status: DNR  If DNR is there a Durable DNR on file? : [] Yes [] No (If no, complete Durable DNR)    HISTORY     History obtained from: Chart review, hospice liaison    CHIEF COMPLAINT: N/A  The patient is:   [] Verbal  [x] Nonverbal  [x] Unresponsive    HPI/SUBJECTIVE:  pt is lethargic, minimally responsive, does not talk, appears frail and ill, malnourished. Appears in slightly labored breathing and moans when touched, slightly restless. REVIEW OF SYSTEMS     The following systems were: [] reviewed  [x] unable to be reviewed    Positive ROS include:  Constitutional: fatigue, weakness, in pain, short of breath  Ears/nose/mouth/throat: increased airway secretions  Respiratory:shortness of breath, wheezing  Gastrointestinal:poor appetite, nausea, vomiting, abdominal pain, constipation, diarrhea  Musculoskeletal:pain, deformities, swelling legs  Neurologic:confusion, hallucinations, weakness  Psychiatric:anxiety, feeling depressed, poor sleep  Endocrine:     Adult Non-Verbal Pain Assessment Score: 6    Face  [] 0   No particular expression or smile  [] 1   Occasional grimace, tearing, frowning, wrinkled forehead  [x] 2   Frequent grimace, tearing, frowning, wrinkled forehead    Activity (movement)  [] 0   Lying quietly, normal position  [x] 1   Seeking attention through movement or slow, cautious movement  [] 2   Restless, excessive activity and/or withdrawal reflexes    Guarding  [] 0   Lying quietly, no positioning of hands over areas of body  [x] 1   Splinting areas of the body, tense  [] 2   Rigid, stiff    Physiology (vital signs)  [] 0   Stable vital signs  [x] 1   Change in any of the following: SBP > 20mm Hg; HR > 20/minute  [] 2   Change in any of the following: SBP > 30mm Hg; HR > 25/minute    Respiratory  [] 0   Baseline RR/SpO2, compliant with ventilator  [x] 1   RR > 10 above baseline, or 5% drop SpO2, mild asynchrony with ventilator  [] 2   RR > 20 above baseline, or 10% drop SpO2, asynchrony with ventilator     FUNCTIONAL ASSESSMENT     Palliative Performance Scale (PPS):10%       PSYCHOSOCIAL/SPIRITUAL ASSESSMENT     Active Problems:    Metabolic encephalopathy (26/17/2124)      Past Medical History:   Diagnosis Date    Blood disorder     Dementia ([facility] [address]       No past surgical history on file.    Social History     Tobacco Use Smoking status: Not on file    Smokeless tobacco: Not on file   Substance Use Topics    Alcohol use: Not on file     No family history on file.    Allergies   Allergen Reactions    Penicillins Anaphylaxis      Current Facility-Administered Medications   Medication Dose Route Frequency    acetaminophen (TYLENOL) suppository 650 mg  650 mg Rectal Q4H PRN    glycopyrrolate (ROBINUL) injection 0.2 mg  0.2 mg IntraVENous Q4H PRN    bisacodyL (DULCOLAX) suppository 10 mg  10 mg Rectal DAILY PRN    morphine injection 2 mg  2 mg IntraVENous Q15MIN PRN    midazolam (VERSED) injection 2 mg  2 mg IntraVENous Q15MIN PRN    morphine injection 2 mg  2 mg IntraVENous Q4H    saline peripheral flush soln 5 mL  5 mL InterCATHeter PRN    glycopyrrolate (ROBINUL) injection 0.2 mg  0.2 mg IntraVENous Q4H    midazolam (VERSED) injection 2 mg  2 mg IntraVENous Q4H        PHYSICAL EXAM     Wt Readings from Last 3 Encounters:   12/19/22 60.8 kg (134 lb)       Visit Vitals  Pulse (!) 108   SpO2 (!) 86%       Supplemental O2  [x] Yes  [] NO  Last bowel movement:     Currently this patient has:  [x] Peripheral IV [] PICC  [] PORT [] ICD    [x] Mederos Catheter [] NG Tube   [] PEG Tube    [] Rectal Tube [] Drain  [] Other:     Constitutional: lethargic, chronically ill, appears in pain/distress  Eyes: closed  ENMT: dry mucosa  Cardiovascular: irregular rhythm, tachycardia, distal peripheral leg edema  Respiratory: slightly labored breathing  Gastrointestinal: soft, bowel sounds+  Musculoskeletal: right leg in traction/immobilizer for distal comminuted fracture  Skin: warm, dry  Neurologic:unresponsive, intermittent restlessness  Psychiatric: calm   Other:       Pertinent Lab and or Imaging Tests:  Lab Results   Component Value Date/Time    Sodium 139 12/23/2022 03:24 AM    Potassium HEMOLYZED,RECOLLECT REQUESTED 12/23/2022 03:24 AM    Chloride 112 (H) 12/23/2022 03:24 AM    CO2 28 12/23/2022 03:24 AM    Anion gap NEG 1 12/23/2022 03:24 AM Glucose 109 (H) 12/23/2022 03:24 AM    BUN 18 12/23/2022 03:24 AM    Creatinine 0.70 12/23/2022 03:24 AM    BUN/Creatinine ratio 26 (H) 12/23/2022 03:24 AM    Calcium 8.2 (L) 12/23/2022 03:24 AM     Lab Results   Component Value Date/Time    Protein, total 5.7 (L) 12/23/2022 03:24 AM    Albumin 2.6 (L) 12/23/2022 03:24 AM           Total time:   Counseling / coordination time:   > 50% counseling / coordination?:

## 2022-12-25 NOTE — PROGRESS NOTES
Spiritual Care Assessment/Progress Note  [address]      NAME: Laura Centeno      MRN: [id number]  AGE: 80 y.o. SEX: female  Hoahaoism Affiliation: No preference   Language: English     12/25/2022     Total Time (in minutes): 35     Spiritual Assessment begun in [facility] CV SERVICES UNIT through conversation with:         []Patient        [x] Family    [] Friend(s)        Reason for Consult: Death, Hospice     Spiritual beliefs: (Please include comment if needed)     [x] Identifies with a checo tradition: Natividad Tadeo        [] Supported by a checo community:            [] Claims no spiritual orientation:           [] Seeking spiritual identity:                [] Adheres to an individual form of spirituality:           [] Not able to assess:                           Identified resources for coping:      [] Prayer                               [] Music                  [] Guided Imagery     [x] Family/friends                 [] Pet visits     [] Devotional reading                         [] Unknown     [] Other:                                               Interventions offered during this visit: (See comments for more details)    Patient Interventions: Prayer (actual)     Family/Friend(s):  Affirmation of emotions/emotional suffering, Affirmation of checo, Catharsis/review of pertinent events in supportive environment, End of life issues discussed, Coping skills reviewed/reinforced, Iconic (affirming the presence of God/Higher Power), Life review/legacy, Prayer (actual), Normalization of emotional/spiritual concerns     Plan of Care:     [] Support spiritual and/or cultural needs    [] Support AMD and/or advance care planning process      [] Support grieving process   [] Coordinate Rites and/or Rituals    [] Coordination with community clergy   [] No spiritual needs identified at this time   [] Detailed Plan of Care below (See Comments)  [] Make referral to Music Therapy  [] Make referral to Pet Therapy     [] Make referral to Addiction services  [] Make referral to [facility]  [] Make referral to Spiritual Care Partner  [] No future visits requested        [x] Follow up visits as needed     Visited in response to notification of patient's death. Her son Eddie was at bedside and was present with her at time of death. She had a daughter as well who  some years ago. Her , Eddie's step-father has health issues and is unable to be present. Listened as Eddie spoke of his mother and her love for family.  Offered a prayer of commendation at Eddie's request.  Chaplain Chaudhary MDiv, MS, [facility]

## 2022-12-25 NOTE — PROGRESS NOTES
Problem: Breathing Pattern - Ineffective  Goal: *Use of effective breathing techniques  Outcome: Progressing Towards Goal     Problem: Pain  Goal: *Control of acute pain  Outcome: Not Progressing Towards Goal     Problem: Pressure Injury - Risk of  Goal: *Prevention of pressure injury  Outcome: Not Progressing Towards Goal  Note: Pressure Injury Interventions:  Sensory Interventions: Assess changes in LOC, Check visual cues for pain    Moisture Interventions: Internal/External urinary devices, Assess need for specialty bed    Activity Interventions: Assess need for specialty bed    Mobility Interventions: Assess need for specialty bed, HOB 30 degrees or less    Nutrition Interventions: Document food/fluid/supplement intake    Friction and Shear Interventions: Apply protective barrier, creams and emollients, Minimize layers                Problem: Pressure Injury - Risk of  Goal: *Prevention of pressure injury  Description: Document Harshad Scale and appropriate interventions in the flowsheet. Outcome: Not Progressing Towards Goal  Note: Pressure Injury Interventions:  Sensory Interventions: Assess changes in LOC, Check visual cues for pain    Moisture Interventions: Internal/External urinary devices, Assess need for specialty bed    Activity Interventions: Assess need for specialty bed    Mobility Interventions: Assess need for specialty bed, HOB 30 degrees or less    Nutrition Interventions: Document food/fluid/supplement intake    Friction and Shear Interventions: Apply protective barrier, creams and emollients, Minimize layers                Problem: Falls - Risk of  Goal: *Absence of Falls  Description: Document Rene Fall Risk and appropriate interventions in the flowsheet.   Outcome: Progressing Towards Goal  Note: Fall Risk Interventions:                                Problem: Hospice Orientation  Goal: Demonstrate understanding of hospice philosophy, plan of care, and home hospice program  Description: The patient/family/caregiver will demonstrate understanding of hospice philosophy, plan of care and the home hospice program as evidenced by participation in meeting the patient's psychosocial, spiritual, medical, and physical needs inclusive of medical supplies/equipment focusing on symptoms. Outcome: Not Progressing Towards Goal     Problem: Potential for Alteration in Skin Integrity  Goal: Monitor skin for areas of alteration in skin integrity  Description: Patient/family/caregiver will demonstrate ability to care for patient's skin, monitor for areas of breakdown, and demonstrate methods to prevent breakdown during hospice care. Outcome: Not Progressing Towards Goal     Problem: Alteration in Mobility  Goal: Remain as independent as possible and remain safe in environment  Description: Patient will remain as independent as possible and remain safe in their environment. Outcome: Progressing Towards Goal     Problem: Pain  Goal: Assess satisfaction of level of comfort and symptom control  Outcome: Not Progressing Towards Goal  Goal: *Control of acute pain  Outcome: Not Progressing Towards Goal     Problem: Anxiety/Agitation  Goal: Verbalize or staff assess the ability to manage anxiety  Description: The patient/family/caregiver will verbalize and demonstrate ability to manage the patient's anxiety throughout hospice care. Outcome: Not Progressing Towards Goal     Problem: Communication Deficit  Goal: Effectively communicate symptoms, needs, and concerns  Description: Patient/family/caregiver will effectively communicate symptoms, needs and concerns.   Outcome: Not Progressing Towards Goal     Problem: Breathing Pattern - Ineffective  Goal: *Use of effective breathing techniques  Outcome: Not Progressing Towards Goal     Problem: General Wound Care  Goal: *Non-infected wound: Improvement of existing wound, absence of infection, and maintenance of skin integrity  Outcome: Not Progressing Towards Goal  Goal: *Infected Wound: Prevention of further infection  Description: Infection control procedures (eg: clean dressings, clean gloves, hand washing, precautions to isolate wound from contamination, sterile instruments used for wound debridement) should be implemented.   Outcome: Progressing Towards Goal  Goal: Interventions  Outcome: Progressing Towards Goal     Problem: Infection - Risk of, Central Venous Catheter-Associated Bloodstream Infection  Goal: *Absence of infection signs and symptoms  Outcome: Progressing Towards Goal     Problem: Infection - Risk of, Urinary Catheter-Associated Urinary Tract Infection  Goal: *Absence of infection signs and symptoms  Outcome: Not Progressing Towards Goal     Problem: Dyspnea Due to End of Life  Goal: Demonstrate understanding of and ability to manage respiratory symptoms at end of life  Outcome: Not Progressing Towards Goal

## 2022-12-25 NOTE — PROGRESS NOTES
1930: Bedside and Verbal shift change report given to SARATH Craig (oncoming nurse) by Darcy Hooper RN (offgoing nurse). Report included the following information SBAR.

## 2022-12-25 NOTE — HOSPICE
[facility]  [phone]    Inpatient Nursing Admission   Patient Name: Celia Phillips  YOB: 1931  Age: 80 y.o. Death Pronouncement Note:     Called to examine patient who has . No response to verbal and tactile stimuli. No respiratory effort. Absent heart sounds and pulses. Pupils fixed and dilated. Patient pronounced dead at 11:42  Pronounced under the supervision of  Dr. Susanne Lang     Family at bedside and grieving appropriately.  bedside providing support.     Verified with patient's son, bedside, that they will use [facility] in [address] [place] [address]    Luz Zaragoza RN, [facility] Nurse Liaison  [phone] Mobile  [phone] Office

## 2022-12-25 NOTE — PROGRESS NOTES
Problem: Breathing Pattern - Ineffective  Goal: *Use of effective breathing techniques  Outcome: Progressing Towards Goal     Problem: Pain  Goal: *Control of acute pain  Outcome: Progressing Towards Goal     Problem: Pressure Injury - Risk of  Goal: *Prevention of pressure injury  Outcome: Progressing Towards Goal  Note: Pressure Injury Interventions:  Sensory Interventions: Assess changes in LOC, Check visual cues for pain    Moisture Interventions: Internal/External urinary devices, Assess need for specialty bed    Activity Interventions: Assess need for specialty bed    Mobility Interventions: Assess need for specialty bed, HOB 30 degrees or less    Nutrition Interventions: Document food/fluid/supplement intake    Friction and Shear Interventions: Apply protective barrier, creams and emollients, Minimize layers                Problem: Grieving  Goal: *Able to express feelings of grief  Outcome: Progressing Towards Goal  Goal: *Able to identify stages of grieving process  Outcome: Progressing Towards Goal     Problem: Hospice Orientation  Goal: Demonstrate understanding of hospice philosophy, plan of care, and home hospice program  Description: The patient/family/caregiver will demonstrate understanding of hospice philosophy, plan of care and the home hospice program as evidenced by participation in meeting the patient's psychosocial, spiritual, medical, and physical needs inclusive of medical supplies/equipment focusing on symptoms. Outcome: Progressing Towards Goal     Problem: Potential for Alteration in Skin Integrity  Goal: Monitor skin for areas of alteration in skin integrity  Description: Patient/family/caregiver will demonstrate ability to care for patient's skin, monitor for areas of breakdown, and demonstrate methods to prevent breakdown during hospice care.   Outcome: Progressing Towards Goal     Problem: Alteration in Mobility  Goal: Remain as independent as possible and remain safe in environment  Description: Patient will remain as independent as possible and remain safe in their environment. Outcome: Progressing Towards Goal     Problem: Pain  Goal: Assess satisfaction of level of comfort and symptom control  Outcome: Progressing Towards Goal  Goal: *Control of acute pain  Outcome: Progressing Towards Goal     Problem: Anticipatory Grief  Goal: Explore reactions to and verbalize acceptance of impending loss  Description: Patient/family/caregiver will explore reactions to and verbalize acceptance of impending loss. Outcome: Progressing Towards Goal     Problem: Anxiety/Agitation  Goal: Verbalize or staff assess the ability to manage anxiety  Description: The patient/family/caregiver will verbalize and demonstrate ability to manage the patient's anxiety throughout hospice care. Outcome: Progressing Towards Goal     Problem: Communication Deficit  Goal: Effectively communicate symptoms, needs, and concerns  Description: Patient/family/caregiver will effectively communicate symptoms, needs and concerns. Outcome: Progressing Towards Goal     Problem: Breathing Pattern - Ineffective  Goal: *Use of effective breathing techniques  Outcome: Progressing Towards Goal     Problem: General Wound Care  Goal: *Non-infected wound: Improvement of existing wound, absence of infection, and maintenance of skin integrity  Outcome: Progressing Towards Goal  Goal: *Infected Wound: Prevention of further infection  Description: Infection control procedures (eg: clean dressings, clean gloves, hand washing, precautions to isolate wound from contamination, sterile instruments used for wound debridement) should be implemented.   Outcome: Progressing Towards Goal  Goal: Interventions  Outcome: Progressing Towards Goal     Problem: Infection - Risk of, Central Venous Catheter-Associated Bloodstream Infection  Goal: *Absence of infection signs and symptoms  Outcome: Progressing Towards Goal     Problem: Infection - Risk of, Urinary Catheter-Associated Urinary Tract Infection  Goal: *Absence of infection signs and symptoms  Outcome: Progressing Towards Goal     Problem: End of Life Process  Goal: Demonstrate understanding of end of life processes  Description: Patient/caregiver will understand end of life processes.   Outcome: Progressing Towards Goal     Problem: Imminent Death  Goal: Collaborate with patient/family/caregiver/interdisciplinary team to minimize and manage end of life symptoms  Outcome: Progressing Towards Goal

## 2022-12-25 NOTE — HOSPICE
[facility]  [phone]    Discharge/Death Nursing Note   Patient Name: Kaylynn Goff  YOB: 1931  Age: 80 y.o. Date of Death: 22  Admitted Date: 2022  Time of Death: 11:42    Facility of Care: [facility]  Level of Care: [facility]  Patient Room: [facility]     Hospice Attending: Colonel Kisha MD  Hospice Diagnosis: Metabolic encephalopathy [B93.92]    Death Pronouncement   Pronouncement of death completed by: Matias Willett RN Pronounced under the supervision of  Dr. Santosh Gomez staff WAS present at the time of death    At the time of death the patient was documented as:  Death Pronouncement Note:     Called to examine patient who has . No response to verbal and tactile stimuli. No respiratory effort. Absent heart sounds and pulses. Pupils fixed and dilated. Patient pronounced dead at 11:42  Pronounced under the supervision of  Dr. Dereck Butcher     Family at bedside and grieving appropriately.  bedside providing support.      Verified with patient's son, bedside, that they will use [facility] in [facility] [address]  [phone]    The pt  within [address] [facility]    The following were notified of the patient's death: Hospice Team, [address] [place] [address] Nurse Supervisor, Family         Discharge Summary   Discharge Reason: Death    Summary of Care Provided:    [x] Post mortem care provided by [facility] Nursing Staff  [x] Notification of  home by [facility]  Nursing Staff  [x] Referrals/Community resources provided:   [x] Goals completed  [] Durable Medical Equipment vendor notified     Disciplines involved: [x] RN [x] SW [x]  [] MALDONADO [] Vol [] PT [] OT [] ST [] BC    [] IDT communication/notification    Sonia Ortiz notified of death    Bereaved   Patient's son,  Shade Ruffing \"Chip\" Low per bereavement assessment     Advance Care Planning 12/15/2022   Confirm Advance Directive Yes, on file   Does the patient have other document types Power of [address], SARATH, [address] Nurse Liaison  [phone] Mobile  [phone] Office  Available on [employer]

## 2022-12-25 NOTE — HOSPICE
[facility] Help to Those in Need  [phone]    GIP Daily Nursing Note   Patient Name: Natalia Bettencourt  YOB: 1931  Age: 80 y.o. Date of Visit: 12/25/22  Facility of Care: [facility]  Patient Room: 2/     Hospice Attending: Garima Melgoza MD  Hospice Diagnosis: Metabolic encephalopathy [N31.63]    Level of Care: GIP    Current GIP Symptoms    1. Pain  2. Agitation  3. Dyspnea  4. Delirium        ASSESSMENT & PLAN   ASSESSMENT: Patient grimacing, furrowed brow. She is moaning, moving her head in distress. Pt warm to touch, blue tinged lips. Traction remains. Unable to assess patient's sacral wounds; plan to assess appropriate specialty bed/mattress with wound care team  Visit Vitals  BP (!) 102/47 (BP 1 Location: Left upper arm, BP Patient Position: Semi fowlers)   Pulse (!) 130   Temp 99 °F (37.2 °C)   Resp (!) 32   SpO2 91%        PLAN of Care:  1. GIP level of care needed for symptoms necessitating frequent skilled nursing assessment and administration of parenteral medications. Needs monitoring for need to titrate sx mgt regimen for optimization of comfort. 2. Pt is at high risk of rapid decline and death due to terminal disease process  3. Provide education and support to unit staff caring for hospice patient and family regarding end of life care and Hospice plan of care. Provide staff with direct contact information to reach hospice team [phone]   4. Provide support and frequent rounds for patient comfort and safety ongoing  5. Provide  support ongoing, continue to discuss discharge plan if patient becomes yoana and does not require acute nursing care interventions for GIP level of care  6. Provide  and bereavement support ongoing  7. Schedule IV Robinul Q 3 hours. 8. Change scheduled Morphine IV from 2 mg to 3 mg. Change dosing time from Q 4 hours to Q 3 hours. 9. Change PRN Morphine from 2 mg to 3 mg  10.  Maintain skin integrity as tolerated for hospice patient, turning and repositioning for comfort, and specialty mattress if appropriate; Unable to assess patient's sacral wounds; plan to assess appropriate specialty bed/mattress with wound care team  11. Mederos care per hospital policy for infection prevention  12. Peripheral line care as per hospital policy for infection prevention  13. Continue with PRN dosing; IV Versed, IV Morphine   14. Discontinue Cardiac Monitoring  15. Transfer pt to [facility] when bed is available      Spiritual Interventions: Hospice Chaplain available for patient and family support ongoing. Psych/ Social/ Emotional Interventions: Hospice social worker available for patient and family support ongoing. See notes from Hospice social worker    Care Coordination Needs: Continue to coordinate Hospice Plan of Care with family and nursing staff     Care plan and New Orders discussed / approved with Dr. Brenda Stafford     Description History and Chart Review     Narrative History of last 24 hours that demonstrates care cannot be provided in another setting:  Frequent bedside rounding for symptom management, safety and family support. What has been done to control the patient's symptoms in the last 24 hours? Pt is unable to tolerate oral medications. Requiring IV Route dosing for best symptom management: Patient is unable to swallow, responsive only to pain    Does the patient currently require IV medications? Yes  Does the patient currently require scheduled medications? YES  Does the patient currently require a PCA?  NO    List number of doses of PRN medications in last 24 hours:  Medication 1: Robinul IV  Number of doses: X 2    Medication 2: Versed IV  Number of doses: X 2    Medication 3: Morphine IV  Number of doses: X 3    Supporting documentation for GIP need for pain control:  [x] Frequent evaluation by a doctor, nurse practitioner, nurse   [x] Frequent medication adjustment    [x] IVs that cannot be administered at home   [x] Aggressive pain management   [x] Complicated technical delivery of medications                Supporting documentation for GIP need for symptom control:  [x]  Sudden decline necessitating intensive nursing intervention  []  Uncontrolled / intractable nausea or vomiting   [x]  Pathological fractures  [x]  Advanced open wounds requiring frequent skilled care  [x] Unmanageable respiratory distress  [x] New or worsening delirium   [] Delirium with behavior issues: Is 24 hour caregiver present due to safety concerns with agitation? (yes/no)  [] Imminent death - with skilled nursing needs documented above    DISCHARGE PLANNING     1. Discharge Plan: If patient stabilizes and is safe to transport, family request pt return home and Myranda Hill will be primary caregiver with Hospice support  2. Patient/Family teaching: End of Life Education and Support  3.  Response to patient/family teaching: Reaching acceptance, grieving complicated by holidays, distance from home    state agreement with current hospice plan of care    ASSESSMENT    KARNOFSKY: 10    Prognosis estimated based on 12/25/22 clinical assessment is:   [] Few to Many Hours  [x] Hours to Days   [] Few to Many Days   [] Days to Weeks   [] Few to Many Weeks   [] Weeks to Months   [] Few to Many Months    Quality Measure: Patient self-reports:  [] Yes    [x] No        Adult Non-Verbal Pain Assessment Score: 9/10     Face  [] 0   No particular expression or smile  [] 1   Occasional grimace, tearing, frowning, wrinkled forehead  [x] 2   Frequent grimace, tearing, frowning, wrinkled forehead     Activity (movement)  [] 0   Lying quietly, normal position  [] 1   Seeking attention through movement or slow, cautious movement  [x] 2   Restless, excessive activity and/or withdrawal reflexes     Guarding  [] 0   Lying quietly, no positioning of hands over areas of body  [x] 1   Splinting areas of the body, tense  [] 2   Rigid, stiff     Physiology (vital signs)  [] 0   Stable vital signs  [] 1   Change in any of the following: SBP > 20mm Hg; HR > 20/minute  [x] 2   Change in any of the following: SBP > 30mm Hg; HR > 25/minute     Respiratory  [] 0   Baseline RR/SpO2, compliant with ventilator  [] 1   RR > 10 above baseline, or 5% drop SpO2, mild asynchrony with ventilator  [x] 2   RR > 20 above baseline, or 10% drop SpO2, asynchrony with ventilator     CLINICAL INFORMATION   Patient Vitals for the past 12 hrs:   Temp Pulse Resp BP SpO2   12/25/22 0804 99 °F (37.2 °C) (!) 110 29 (!) 102/47 91 %   12/25/22 0547 -- (!) 113 -- -- --   12/25/22 0349 -- (!) 124 -- -- --   12/25/22 0148 -- (!) 144 -- -- --   12/25/22 0145 -- -- -- (!) 72/54 --   12/24/22 2300 99.1 °F (37.3 °C) (!) 108 27 -- 97 %   12/24/22 2149 -- (!) 109 -- -- --       Currently this patient has:  [x] Supplemental O2   [x] IV    [] PICC      [] PORT   [] NG Tube    [] PEG Tube   [] Ostomy     [x] Mederos draining _______ urine  [] Other:     SIGNS/PHYSICAL FINDINGS     Skin (including wound):  [] Warm, dry, supple, intact and color normal for race  [x] Warm   [x] Dry   [] Cool     [] Clammy       [] Diaphoretic    Turgor   [] Normal   [x] Decreased  Color:   [] Pink   [x] Pale   [] Cyanotic   [] Erythema   [] Jaundice   [] Normal for Race  [x]  Wounds: Sacral wounds and Femur Fracture    Neuro:  [] Lethargy  [x] Restlessness / agitation  [x] Confusion / delirium  [] Hallucinations  [x] Responds to maximal stimulation  [] Unresponsive  [] Seizures     Cardiac:  [] Dyspnea on Exertion  [] JVD  [] Murmur  [] Palpitations  [] Hypotension  [] Hypertension  [x] Tachycardia  [] Bradycardia  [x] Irregular HR  [x] Pulses Decreased  [] Pulses Absent  [] Edema:         [] Mottling:          Respiratory:  Breath sounds:    [] Diminished   [] Wheeze   [x] Rhonchi   [] Rales   [] Even and unlabored  [x] Labored:            [] Cough   [] Non Productive   [] Productive    [] Description:           [] Deep suctioned   [x] O2 at _2__ LPM  [] High flow oxygen greater than 10 LPM  [] Bi-Pap    GI  [x] Abdomen Soft; tender to touch  [] Ascites  [] Nausea  [] Vomiting  [] Incontinent of bowels  [x] Bowel sounds yes  [] Diarrhea  [] Constipation (see above including last bowel movement)  [] Checked for impaction  [] Last BM     Nutrition  Diet:_____Oral Care_____  Appetite:   [] Good   [] Fair   [] Poor   [] Tube Feeding       [] Voiding  [] Incontinent   [x] Mederos    Musculoskeletal  [] Balance/[place] Unsteady   [x] Weak   Strength:    [] Normal    [] Limited    [x] Decreasing   Activities:    [] Up as tolerated   [x] Bedridden    [] Specify:    SAFETY  [x] 24 hr. Caregiver   [x] Side rails ? [x] Hospital bed   [x] Reviewed Falls & Safety       ALLERGIES AND MEDICATIONS     Allergies:    Allergies   Allergen Reactions    Penicillins Anaphylaxis       Current Facility-Administered Medications   Medication Dose Route Frequency    acetaminophen (TYLENOL) suppository 650 mg  650 mg Rectal Q4H PRN    glycopyrrolate (ROBINUL) injection 0.2 mg  0.2 mg IntraVENous Q4H PRN    bisacodyL (DULCOLAX) suppository 10 mg  10 mg Rectal DAILY PRN    morphine injection 2 mg  2 mg IntraVENous Q15MIN PRN    midazolam (VERSED) injection 2 mg  2 mg IntraVENous Q15MIN PRN    morphine injection 2 mg  2 mg IntraVENous Q4H    saline peripheral flush soln 5 mL  5 mL InterCATHeter PRN    midazolam (VERSED) injection 2 mg  2 mg IntraVENous Q4H          Visit Time In:   Visit Time Out:     Ivet Orourke RN, [facility] Nurse Liaison  [phone] Mobile  [phone] Office  Available on [employer]

## 2022-12-25 NOTE — PROGRESS NOTES
0730: Bedside and Verbal shift change report given to SARATH Gottlieb (oncoming nurse) by May Webber RN (offgoing nurse). Report included the following information SBAR, Kardex, MAR, and Cardiac Rhythm A. Fib  .     1151: Pt family called out d/t pt stopped breathing, pulse check, no pulse present, hospice RN Montserrat Andrade notified. Notified nursing supervisor Kenya Harrell and  bart. Son, Eddie, at bedside. [facility] [employer] 1223: Record of Death form completed. [employer] called and left voice message per prompt, Supervisors aware. Post mortem care completed and all lines removed.           Care Plan:   Problem: Breathing Pattern - Ineffective  Goal: *Use of effective breathing techniques  Outcome: Progressing Towards Goal       Problem: Pain  Goal: *Control of acute pain  Outcome: Progressing Towards Goal       Problem: Pressure Injury - Risk of  Goal: *Prevention of pressure injury  Outcome: Progressing Towards Goal  Note: Pressure Injury Interventions:  Sensory Interventions: Keep linens dry and wrinkle-free, Maintain/enhance activity level, Minimize linen layers, Monitor skin under medical devices, Float heels    Moisture Interventions: Minimize layers, Absorbent underpads, Apply protective barrier, creams and emollients    Activity Interventions: PT/OT evaluation, Pressure redistribution bed/mattress(bed type), Increase time out of bed    Mobility Interventions: PT/OT evaluation, Pressure redistribution bed/mattress (bed type), HOB 30 degrees or less, Float heels    Nutrition Interventions:  (NPO on hospice; performing mouth care)    Friction and Shear Interventions: Apply protective barrier, creams and emollients, Minimize layers       Problem: Grieving  Goal: *Able to express feelings of grief  Outcome: Progressing Towards Goal  Goal: *Able to identify stages of grieving process  Outcome: Progressing Towards Goal       Problem: Mood - Altered  Goal: *Alleviation of anxiety and depressive symptoms  Outcome: Progressing Towards Goal       Problem: Discharge Planning  Goal: *Participates in discharge planning  Outcome: Progressing Towards Goal       Problem: Patient Education: Go to Patient Education Activity  Goal: Patient/Family Education  Outcome: Progressing Towards Goal       Problem: Emotional Support Needs  Goal: *Able to cope  Description: Family to continue to express anticipatory grief and be supportive of one another. Outcome: Progressing Towards Goal       Problem: Pressure Injury - Risk of  Goal: *Prevention of pressure injury  Description: Document Harshad Scale and appropriate interventions in the flowsheet. Outcome: Progressing Towards Goal  Note: Pressure Injury Interventions:  Sensory Interventions: Keep linens dry and wrinkle-free, Maintain/enhance activity level, Minimize linen layers, Monitor skin under medical devices, Float heels    Moisture Interventions: Minimize layers, Absorbent underpads, Apply protective barrier, creams and emollients    Activity Interventions: PT/OT evaluation, Pressure redistribution bed/mattress(bed type), Increase time out of bed    Mobility Interventions: PT/OT evaluation, Pressure redistribution bed/mattress (bed type), HOB 30 degrees or less, Float heels    Nutrition Interventions:  (NPO on hospice; performing mouth care)    Friction and Shear Interventions: Apply protective barrier, creams and emollients, Minimize layers       Problem: Patient Education: Go to Patient Education Activity  Goal: Patient/Family Education  Outcome: Progressing Towards Goal       Problem: Falls - Risk of  Goal: *Absence of Falls  Description: Document Rene Fall Risk and appropriate interventions in the flowsheet.   Outcome: Progressing Towards Goal  Note: Fall Risk Interventions:  Medication Interventions: Bed/chair exit alarm    Elimination Interventions: Bed/chair exit alarm, Call light in reach    History of Falls Interventions: Bed/chair exit alarm, Door open when patient unattended, Room close to nurse's station       Problem: Patient Education: Go to Patient Education Activity  Goal: Patient/Family Education  Outcome: Progressing Towards Goal       Problem: Hospice Orientation  Goal: Demonstrate understanding of hospice philosophy, plan of care, and home hospice program  Description: The patient/family/caregiver will demonstrate understanding of hospice philosophy, plan of care and the home hospice program as evidenced by participation in meeting the patient's psychosocial, spiritual, medical, and physical needs inclusive of medical supplies/equipment focusing on symptoms. Outcome: Progressing Towards Goal       Problem: Potential for Alteration in Skin Integrity  Goal: Monitor skin for areas of alteration in skin integrity  Description: Patient/family/caregiver will demonstrate ability to care for patient's skin, monitor for areas of breakdown, and demonstrate methods to prevent breakdown during hospice care. Outcome: Progressing Towards Goal       Problem: Alteration in Mobility  Goal: Remain as independent as possible and remain safe in environment  Description: Patient will remain as independent as possible and remain safe in their environment. Outcome: Progressing Towards Goal       Problem: Pain  Goal: Assess satisfaction of level of comfort and symptom control  Outcome: Progressing Towards Goal  Goal: *Control of acute pain  Outcome: Progressing Towards Goal       Problem: Anticipatory Grief  Goal: Explore reactions to and verbalize acceptance of impending loss  Description: Patient/family/caregiver will explore reactions to and verbalize acceptance of impending loss. Outcome: Progressing Towards Goal       Problem: Anxiety/Agitation  Goal: Verbalize or staff assess the ability to manage anxiety  Description: The patient/family/caregiver will verbalize and demonstrate ability to manage the patient's anxiety throughout hospice care.   Outcome: Progressing Towards Goal       Problem: Communication Deficit  Goal: Effectively communicate symptoms, needs, and concerns  Description: Patient/family/caregiver will effectively communicate symptoms, needs and concerns. Outcome: Progressing Towards Goal       Problem: Coping and Emotional Distress  Goal: Demonstrate acceptance of terminal illness and understanding of disease progression  Description: Patient/family/caregiver will demonstrate acceptance of terminal disease and understanding of disease progression while employing appropriate coping mechanisms.   Outcome: Progressing Towards Goal       Problem: Breathing Pattern - Ineffective  Goal: *Use of effective breathing techniques  Outcome: Progressing Towards Goal       Problem: Pressure Injury - Risk of  Goal: *Prevention of pressure injury  Outcome: Progressing Towards Goal  Note: Pressure Injury Interventions:  Sensory Interventions: Keep linens dry and wrinkle-free, Maintain/enhance activity level, Minimize linen layers, Monitor skin under medical devices, Float heels    Moisture Interventions: Minimize layers, Absorbent underpads, Apply protective barrier, creams and emollients    Activity Interventions: PT/OT evaluation, Pressure redistribution bed/mattress(bed type), Increase time out of bed    Mobility Interventions: PT/OT evaluation, Pressure redistribution bed/mattress (bed type), HOB 30 degrees or less, Float heels    Nutrition Interventions:  (NPO on hospice; performing mouth care)    Friction and Shear Interventions: Apply protective barrier, creams and emollients, Minimize layers       Problem: Grieving  Goal: *Able to express feelings of grief  Outcome: Progressing Towards Goal  Goal: *Able to identify stages of grieving process  Outcome: Progressing Towards Goal       Problem: Infection - Risk of, Central Venous Catheter-Associated Bloodstream Infection  Goal: *Absence of infection signs and symptoms  Outcome: Progressing Towards Goal Problem: Infection - Risk of, Urinary Catheter-Associated Urinary Tract Infection  Goal: *Absence of infection signs and symptoms  Outcome: Progressing Towards Goal       Problem: End of Life Process  Goal: Demonstrate understanding of end of life processes  Description: Patient/caregiver will understand end of life processes.   Outcome: Progressing Towards Goal       Problem: Dyspnea Due to End of Life  Goal: Demonstrate understanding of and ability to manage respiratory symptoms at end of life  Outcome: Progressing Towards Goal

## 2022-12-25 NOTE — PROGRESS NOTES
Problem: Pain  Goal: *Control of acute pain  Outcome: Progressing Towards Goal     Problem: Mood - Altered  Goal: *Alleviation of anxiety and depressive symptoms  Outcome: Progressing Towards Goal     Problem: Pain  Goal: Assess satisfaction of level of comfort and symptom control  Outcome: Progressing Towards Goal     Problem: Infection - Risk of, Urinary Catheter-Associated Urinary Tract Infection  Goal: *Absence of infection signs and symptoms  Outcome: Progressing Towards Goal     Problem: Imminent Death  Goal: Collaborate with patient/family/caregiver/interdisciplinary team to minimize and manage end of life symptoms  Outcome: Progressing Towards Goal

## 2022-12-26 ENCOUNTER — HOME CARE VISIT (OUTPATIENT)
Dept: HOSPICE | Facility: HOSPICE | Age: 87
End: 2022-12-26
Payer: MEDICARE

## 2022-12-26 NOTE — HOSPICE
[facility]  Good Help to Those in Need  [phone] [address] Bereavement/Condolence Call: This LCSW called pts son CHip to offer condolences and support. LCSW offered [address] information for ongoing support. Son reports they are fine for now, have many people in the home.        [facility]    [phone]

## 2022-12-28 LAB
BACTERIA SPEC CULT: NORMAL
SERVICE CMNT-IMP: NORMAL

## 2022-12-29 LAB
ALBUMIN SERPL ELPH-MCNC: 2.7 G/DL (ref 2.9–4.4)
ALBUMIN/GLOB SERPL: 1.3 {RATIO} (ref 0.7–1.7)
ALPHA1 GLOB SERPL ELPH-MCNC: 0.3 G/DL (ref 0–0.4)
ALPHA2 GLOB SERPL ELPH-MCNC: 0.3 G/DL (ref 0.4–1)
B-GLOBULIN SERPL ELPH-MCNC: 1.3 G/DL (ref 0.7–1.3)
GAMMA GLOB SERPL ELPH-MCNC: 0.2 G/DL (ref 0.4–1.8)
GLOBULIN SER-MCNC: 2.1 G/DL (ref 2.2–3.9)
IGA SERPL-MCNC: 21 MG/DL (ref 64–422)
IGG SERPL-MCNC: 226 MG/DL (ref 586–1602)
IGM SERPL-MCNC: 1546 MG/DL (ref 26–217)
INTERPRETATION SERPL IEP-IMP: ABNORMAL
KAPPA LC FREE SER-MCNC: 81.3 MG/L (ref 3.3–19.4)
KAPPA LC FREE/LAMBDA FREE SER: 12.51 {RATIO} (ref 0.26–1.65)
LAMBDA LC FREE SERPL-MCNC: 6.5 MG/L (ref 5.7–26.3)
M PROTEIN SERPL ELPH-MCNC: 0.9 G/DL
PROT SERPL-MCNC: 4.8 G/DL (ref 6–8.5)

## 2023-01-03 NOTE — DISCHARGE SUMMARY
Discharge Summary    [facility]  Good Help to Those in Need  [phone]      Date of Admission: 12/24/2022  Date of Discharge: 12/25/2022    Beverly Ahumada is a 80y.o. year old who was admitted to [facility] at [facility] with a Hospice diagnosis of Metabolic encephalopathy [T77.19]. Pt was admitted for [facility] level care. Per HPI  Beverly Ahumada is a 80y.o. year old who was admitted to [facility] inpatient with principle diagnosis of Acute  Metabolic Encephalopathy. Pt initially admitted with right distal femoral fracture requiring evaluation by orthopedic surgery however was found to be a poor surgical candidate due to multiple risk factors including worsening anemia, poor nutritional status, & unstable vital signs. Pt was noted to have hematoma around her fracture femur and low hemoglobin requiring transfusion of PRBC. She has iron deficiency at baseline. Patient also was noted to be jaundiced. Pt with known history of atrial fibrillation and now with  RVR for which she was started on IV Cardizem. Patient chronically takes Coumadin for her A. fib as well as factor V [place] & now was managed with heparin drip. She was also found to have right popliteal DVT likely acute in setting of immobility from fracture, plus factor V leiden. She has an unstageable sacral pressure wound requiring wound care. Her over all prognosis is poor. Family decided to stop aggressive interventions ad focus on managing her pain and other symptoms causing discomfort. PPS 10%         HOSPICE DIAGNOSES   Active Symptoms:  1. Labored breathing  2. Decreased responsiveness  3  Restlessness/agitation  4. Non verbal pain  5. Sacral wound  6. Fracture right leg/distal femur; in immobilizer  7.  Hospice care      PLAN   Admit pt to [facility] LOC as she is medically unstable, requiring close monitoring and active management of symptoms  Versed 2mg IV scheduled every 4 hours and every 15mts as needed  Morphine 2mg IV scheduled every 4 hours and every 15mts as needed  Robinul 0.2mg IV every 4 hours as needed  Other comfort meds as needed  O2 for comfort  Mederos's catheter care  No family at bedside at this time. Son was here earlier and had met with hospice liaison and is in agreement with hospice plan of care. He will return later this evening.  and SW to support family needs  Disposition: Likely will pass here  Hospice Plan of care was reviewed in detail and agree with current plan of care      The patient's care was focused on comfort and the patient passed away on 12/25/2022. Marge Rodriguez